# Patient Record
Sex: FEMALE | Race: WHITE | Employment: FULL TIME | URBAN - METROPOLITAN AREA
[De-identification: names, ages, dates, MRNs, and addresses within clinical notes are randomized per-mention and may not be internally consistent; named-entity substitution may affect disease eponyms.]

---

## 2017-01-11 RX ORDER — LEVOTHYROXINE SODIUM 88 UG/1
88 TABLET ORAL
Qty: 90 TAB | Refills: 3 | Status: SHIPPED | OUTPATIENT
Start: 2017-01-11 | End: 2017-05-29 | Stop reason: SDUPTHER

## 2017-01-11 NOTE — TELEPHONE ENCOUNTER
[facility] called and states that they have received the prescription request for the pts Ambien at there fax and they wanted to advise you so the medication could be resubmitted. Please advise.

## 2017-01-13 RX ORDER — ZOLPIDEM TARTRATE 10 MG/1
10 TABLET ORAL
Qty: 90 TAB | Refills: 1 | Status: SHIPPED | OUTPATIENT
Start: 2017-01-13 | End: 2017-07-16 | Stop reason: SDUPTHER

## 2017-01-20 DIAGNOSIS — G25.81 RLS (RESTLESS LEGS SYNDROME): ICD-10-CM

## 2017-01-20 RX ORDER — VALSARTAN 160 MG/1
TABLET ORAL
Qty: 90 TAB | Refills: 3 | Status: SHIPPED | OUTPATIENT
Start: 2017-01-20 | End: 2017-07-17 | Stop reason: SDUPTHER

## 2017-01-20 RX ORDER — CLONAZEPAM 0.5 MG/1
0.5 TABLET ORAL
Qty: 90 TAB | Refills: 1 | Status: SHIPPED | OUTPATIENT
Start: 2017-01-20 | End: 2017-08-17 | Stop reason: SDUPTHER

## 2017-01-20 NOTE — TELEPHONE ENCOUNTER
[facility] called to request refills for 90 day supplies with refills for the patient. Please call if any questions.  Thank you

## 2017-05-26 ENCOUNTER — OFFICE VISIT (OUTPATIENT)
Dept: INTERNAL MEDICINE CLINIC | Age: 62
End: 2017-05-26

## 2017-05-26 VITALS
SYSTOLIC BLOOD PRESSURE: 140 MMHG | DIASTOLIC BLOOD PRESSURE: 60 MMHG | OXYGEN SATURATION: 99 % | BODY MASS INDEX: 28.85 KG/M2 | WEIGHT: 156.8 LBS | HEIGHT: 62 IN | TEMPERATURE: 97.9 F | RESPIRATION RATE: 18 BRPM | HEART RATE: 76 BPM

## 2017-05-26 DIAGNOSIS — Z12.11 COLON CANCER SCREENING: ICD-10-CM

## 2017-05-26 DIAGNOSIS — E78.00 PURE HYPERCHOLESTEROLEMIA: ICD-10-CM

## 2017-05-26 DIAGNOSIS — E03.4 HYPOTHYROIDISM DUE TO ACQUIRED ATROPHY OF THYROID: ICD-10-CM

## 2017-05-26 DIAGNOSIS — I10 ESSENTIAL HYPERTENSION, BENIGN: Primary | ICD-10-CM

## 2017-05-26 DIAGNOSIS — G25.81 RLS (RESTLESS LEGS SYNDROME): ICD-10-CM

## 2017-05-26 RX ORDER — ESTERIFIED ESTROGEN AND METHYLTESTOSTERONE .625; 1.25 MG/1; MG/1
TABLET ORAL
COMMUNITY
Start: 2017-05-14 | End: 2017-11-01 | Stop reason: DRUGHIGH

## 2017-05-26 NOTE — PROGRESS NOTES
Chief Complaint   Patient presents with    Hypertension     follow up     1. Have you been to the ER, urgent care clinic since your last visit? Hospitalized since your last visit? No    2. Have you seen or consulted any other health care providers outside of the [address] [place] [address] since your last visit? Include any pap smears or colon screening.  No

## 2017-05-26 NOTE — MR AVS SNAPSHOT
Visit Information Date & Time Provider Department Dept. Phone Encounter #  
 5/26/2017  8:00 AM Gabriel Peterson, [address] [phone] [id number] Follow-up Instructions Return in about 6 months (around 11/26/2017) for htn hld hypothyoid. Upcoming Health Maintenance Date Due COLONOSCOPY 5/4/2017 INFLUENZA AGE 9 TO ADULT 8/1/2017 PAP AKA CERVICAL CYTOLOGY 9/17/2018 BREAST CANCER SCRN MAMMOGRAM 12/2/2018 DTaP/Tdap/Td series (2 - Td) 4/30/2022 Allergies as of 5/26/2017  Review Complete On: 5/26/2017 By: Ninetta Olszewski Severity Noted Reaction Type Reactions Aspirin  02/16/2010    Other (comments)  
 abd pain and cramping Clindamycin  02/28/2014    Other (comments) Abdominal pain Ibuprofen  02/16/2010    Other (comments)  
 abd pain and cramping Lisinopril  09/18/2015    Cough Nsaids (Non-steroidal Anti-inflammatory Drug)  02/13/2012    Other (comments) Pain and burning Pcn [Penicillins]  08/18/2009    Rash Tetracycline  08/18/2009    Nausea and Vomiting Current Immunizations  Reviewed on 11/13/2014 Name Date Influenza Vaccine 9/19/2014, 9/28/2013 Influenza Vaccine Whole 10/18/2010 Tdap 4/30/2012 Varicella Virus Vaccine 2/28/2013 Not reviewed this visit You Were Diagnosed With   
  
 Codes Comments Essential hypertension, benign    -  Primary ICD-10-CM: I10 
ICD-9-CM: 401.1 Pure hypercholesterolemia     ICD-10-CM: E78.00 ICD-9-CM: 272.0 Hypothyroidism due to acquired atrophy of thyroid     ICD-10-CM: E03.4 ICD-9-CM: 244.8, 246.8 RLS (restless legs syndrome)     ICD-10-CM: G25.81 ICD-9-CM: 333.94 Colon cancer screening     ICD-10-CM: Z12.11 ICD-9-CM: V76.51 Vitals BP Pulse Temp Resp Height(growth percentile) Weight(growth percentile)  140/60 (BP 1 Location: Right arm, BP Patient Position: Sitting) 76 97.9 °F (36.6 °C) (Oral) 18 5' 2\" (1.575 m) 156 lb 12.8 oz (71.1 kg) SpO2 BMI OB Status Smoking Status 99% 28.68 kg/m2 Hysterectomy Former Smoker BMI and BSA Data Body Mass Index Body Surface Area  
 28.68 kg/m 2 1.76 m 2 Preferred Pharmacy Pharmacy Name Phone [facility] [address], [address] [place] [address] [phone] Your Updated Medication List  
  
   
This list is accurate as of: 5/26/17  8:21 AM.  Always use your most recent med list.  
  
  
  
  
 clonazePAM 0.5 mg tablet Commonly known as:  Shelba Hock Take 1 Tab by mouth nightly as needed. * ESTRATEST 1.25-2.5 mg per tablet Generic drug:  estrogens (conjugated)-methylTESTOSTERone Take 1 Tab by mouth daily. * estrogens (conjugated)-methylTESTOSTERone 0.625-1.25 mg per tablet Commonly known as:  ESTRATEST HS  
  
 levothyroxine 88 mcg tablet Commonly known as:  SYNTHROID Take 1 Tab by mouth Daily (before breakfast). M-VIT PO Take 1 Tab by mouth daily. RESTASIS 0.05 % ophthalmic emulsion Generic drug:  cycloSPORINE Administer 1 Drop to both eyes two (2) times a day. simvastatin 20 mg tablet Commonly known as:  ZOCOR  
TAKE 1 TABLET NIGHTLY  
  
 valsartan 160 mg tablet Commonly known as:  DIOVAN  
TAKE 1 TABLET DAILY  
  
 zolpidem 10 mg tablet Commonly known as:  AMBIEN Take 1 Tab by mouth nightly as needed for Sleep. Max Daily Amount: 10 mg.  
  
 ZyrTEC 10 mg tablet Generic drug:  cetirizine Take  by mouth. * Notice: This list has 2 medication(s) that are the same as other medications prescribed for you. Read the directions carefully, and ask your doctor or other care provider to review them with you. We Performed the Following CBC W/O DIFF [28077 CPT(R)] LIPID PANEL [88341 CPT(R)] METABOLIC PANEL, COMPREHENSIVE [03865 CPT(R)] OCCULT BLOOD, IMMUNOASSAY (FIT) [id number] CPT(R)] TSH 3RD GENERATION [06815 CPT(R)] Follow-up Instructions Return in about 6 months (around 11/26/2017) for htn hld hypothyoid. Introducing [facility]! Dear Carlos Reid: 
Thank you for requesting a [employer] account. Our records indicate that you already have an active [employer] account. You can access your account anytime at [email or web address] Did you know that you can access your hospital and ER discharge instructions at any time in [employer]? You can also review all of your test results from your hospital stay or ER visit. Additional Information If you have questions, please visit the Frequently Asked Questions section of the [employer] website at [email or web address] Remember, [employer]ing is NOT to be used for urgent needs. For medical emergencies, dial 911. Now available from your iPhone and Android! Please provide this summary of care documentation to your next provider. Your primary care clinician is listed as Susie MOULTON. If you have any questions after today's visit, please call [phone].

## 2017-05-26 NOTE — PROGRESS NOTES
HISTORY OF PRESENT ILLNESS  Lynda Hadley is a 63 y.o. female. HPI     F/u HT HLD RLS  Sees Dr Carol Boo for hypothyroidism yearly  Lost her  to sudden illness last month  Dealing with the death ok  Takes ambien and RLS for insomnia and RLS  bp at home 2 times /week 130's/70's      Patient Active Problem List    Diagnosis Date Noted    Dry eyes 09/18/2015    Hypothyroidism due to acquired atrophy of thyroid 12/31/2012    Essential hypertension, benign 08/18/2009    Pure hypercholesterolemia 08/18/2009    RLS (restless legs syndrome) 08/18/2009     Current Outpatient Prescriptions   Medication Sig Dispense Refill    estrogens, conjugated,-methylTESTOSTERone (ESTRATEST HS) 0.625-1.25 mg per tablet       valsartan (DIOVAN) 160 mg tablet TAKE 1 TABLET DAILY 90 Tab 3    clonazePAM (KLONOPIN) 0.5 mg tablet Take 1 Tab by mouth nightly as needed. 90 Tab 1    zolpidem (AMBIEN) 10 mg tablet Take 1 Tab by mouth nightly as needed for Sleep. Max Daily Amount: 10 mg. 90 Tab 1    levothyroxine (SYNTHROID) 88 mcg tablet Take 1 Tab by mouth Daily (before breakfast). 90 Tab 3    cetirizine (ZYRTEC) 10 mg tablet Take  by mouth.  simvastatin (ZOCOR) 20 mg tablet TAKE 1 TABLET NIGHTLY 90 Tab 3    cycloSPORINE (RESTASIS) 0.05 % ophthalmic emulsion Administer 1 Drop to both eyes two (2) times a day.  estrogens, conjugated,-methyltestosterone (ESTRATEST) 1.25-2.5 mg per tablet Take 1 Tab by mouth daily.  PV W-O OXANA/FERROUS FUMARATE/FA (M-VIT PO) Take 1 Tab by mouth daily.        Allergies   Allergen Reactions    Aspirin Other (comments)     abd pain and cramping    Clindamycin Other (comments)     Abdominal pain    Ibuprofen Other (comments)     abd pain and cramping    Lisinopril Cough    Nsaids (Non-Steroidal Anti-Inflammatory Drug) Other (comments)     Pain and burning    Pcn [Penicillins] Rash    Tetracycline Nausea and Vomiting      Lab Results  Component Value Date/Time   Glucose 85 03/18/2016 08:14 AM   LDL, calculated 109 03/18/2016 08:14 AM   Creatinine 0.83 03/18/2016 08:14 AM      Lab Results  Component Value Date/Time   Cholesterol, total 189 03/18/2016 08:14 AM   HDL Cholesterol 69 03/18/2016 08:14 AM   LDL, calculated 109 03/18/2016 08:14 AM   Triglyceride 56 03/18/2016 08:14 AM   CHOL/HDL Ratio 2.4 08/09/2010 03:43 PM       Lab Results  Component Value Date/Time   GFR est AA 89 03/18/2016 08:14 AM   GFR est non-AA 77 03/18/2016 08:14 AM   Creatinine 0.83 03/18/2016 08:14 AM   BUN 9 03/18/2016 08:14 AM   Sodium 141 03/18/2016 08:14 AM   Potassium 4.4 03/18/2016 08:14 AM   Chloride 106 03/18/2016 08:14 AM   CO2 21 03/18/2016 08:14 AM         ROS    Physical Exam   Constitutional: She appears well-developed and well-nourished. Appears stated age   Cardiovascular: Normal rate, regular rhythm and normal heart sounds. Exam reveals no gallop and no friction rub. No murmur heard. Pulmonary/Chest: Effort normal and breath sounds normal. No respiratory distress. She has no wheezes. Abdominal: Soft. Bowel sounds are normal.   Musculoskeletal: She exhibits no edema. Neurological: She is alert. Psychiatric: She has a normal mood and affect. Nursing note and vitals reviewed. ASSESSMENT and PLAN  Jon Turner was seen today for hypertension. Diagnoses and all orders for this visit:    Essential hypertension, benign  -     CBC W/O DIFF  -     METABOLIC PANEL, COMPREHENSIVE   Mild SBP elevation, lower home readings.  Continue Diovan, low sodium diet, bp monitoring    Pure hypercholesterolemia  -     METABOLIC PANEL, COMPREHENSIVE  -     LIPID PANEL   Continue statin tx    Hypothyroidism due to acquired atrophy of thyroid  -     TSH 3RD GENERATION    RLS (restless legs syndrome)   Controlled with Klonopin  Bereavement   Has good family support, still working fulltime, does not feel she needs antidepressant  Colon cancer screening  -     OCCULT BLOOD, IMMUNOASSAY (FIT)   Pt declines referral at this time for screening colonoscopy      Follow-up Disposition:  Return in about 6 months (around 11/26/2017) for htn hld hypothyoid.

## 2017-05-27 LAB
ALBUMIN SERPL-MCNC: 4.1 G/DL (ref 3.6–4.8)
ALBUMIN/GLOB SERPL: 1.6 {RATIO} (ref 1.2–2.2)
ALP SERPL-CCNC: 45 IU/L (ref 39–117)
ALT SERPL-CCNC: 23 IU/L (ref 0–32)
AST SERPL-CCNC: 17 IU/L (ref 0–40)
BILIRUB SERPL-MCNC: 0.5 MG/DL (ref 0–1.2)
BUN SERPL-MCNC: 18 MG/DL (ref 8–27)
BUN/CREAT SERPL: 22 (ref 12–28)
CALCIUM SERPL-MCNC: 9.4 MG/DL (ref 8.7–10.3)
CHLORIDE SERPL-SCNC: 100 MMOL/L (ref 96–106)
CHOLEST SERPL-MCNC: 167 MG/DL (ref 100–199)
CO2 SERPL-SCNC: 22 MMOL/L (ref 18–29)
CREAT SERPL-MCNC: 0.83 MG/DL (ref 0.57–1)
ERYTHROCYTE [DISTWIDTH] IN BLOOD BY AUTOMATED COUNT: 14.6 % (ref 12.3–15.4)
GLOBULIN SER CALC-MCNC: 2.5 G/DL (ref 1.5–4.5)
GLUCOSE SERPL-MCNC: 90 MG/DL (ref 65–99)
HCT VFR BLD AUTO: 41.5 % (ref 34–46.6)
HDLC SERPL-MCNC: 77 MG/DL
HGB BLD-MCNC: 13.8 G/DL (ref 11.1–15.9)
LDLC SERPL CALC-MCNC: 78 MG/DL (ref 0–99)
MCH RBC QN AUTO: 29.3 PG (ref 26.6–33)
MCHC RBC AUTO-ENTMCNC: 33.3 G/DL (ref 31.5–35.7)
MCV RBC AUTO: 88 FL (ref 79–97)
PLATELET # BLD AUTO: 209 X10E3/UL (ref 150–379)
POTASSIUM SERPL-SCNC: 3.8 MMOL/L (ref 3.5–5.2)
PROT SERPL-MCNC: 6.6 G/DL (ref 6–8.5)
RBC # BLD AUTO: 4.71 X10E6/UL (ref 3.77–5.28)
SODIUM SERPL-SCNC: 138 MMOL/L (ref 134–144)
TRIGL SERPL-MCNC: 59 MG/DL (ref 0–149)
TSH SERPL DL<=0.005 MIU/L-ACNC: 0.09 UIU/ML (ref 0.45–4.5)
VLDLC SERPL CALC-MCNC: 12 MG/DL (ref 5–40)
WBC # BLD AUTO: 5.7 X10E3/UL (ref 3.4–10.8)

## 2017-05-29 ENCOUNTER — DOCUMENTATION ONLY (OUTPATIENT)
Dept: INTERNAL MEDICINE CLINIC | Age: 62
End: 2017-05-29

## 2017-05-29 RX ORDER — LEVOTHYROXINE SODIUM 88 UG/1
88 TABLET ORAL
Qty: 90 TAB | Refills: 3
Start: 2017-05-29 | End: 2017-07-17 | Stop reason: SDUPTHER

## 2017-05-29 NOTE — PROGRESS NOTES
Instructed pt to decrease levothyroxine to 88cg every day except 44 mcg q Saturday--pt verbalized understanding.

## 2017-06-29 ENCOUNTER — PATIENT MESSAGE (OUTPATIENT)
Dept: ENDOCRINOLOGY | Age: 62
End: 2017-06-29

## 2017-06-29 DIAGNOSIS — E03.4 HYPOTHYROIDISM DUE TO ACQUIRED ATROPHY OF THYROID: Primary | ICD-10-CM

## 2017-06-29 NOTE — TELEPHONE ENCOUNTER
From: [facility]  To: Vidal Robles MD  Sent: 2017 5:32 AM EDT  Subject: Non-Urgent Medical Question    Good morning Dr. Kennieth Gilford,  Dr. Louisa Cao performed TSH on 17 and it came back as . 085. I would like to have this retested at the end of August. Dr. Louisa Cao suggested that I take 44 mcg one day a week, so I am doing that. I think the results might not be true because my appointment happened three weeks after my   and I could have been improperly taking my medicine, as those first few weeks rattled my thinking. I do not feel as though I am having any hyperthyroid symptoms, which also leads me to believe that I may have taken too much levothyroxine. Please advise.   Thanks,  Gordon Jacobo

## 2017-06-30 LAB — HEMOCCULT STL QL IA: NEGATIVE

## 2017-07-17 ENCOUNTER — PATIENT MESSAGE (OUTPATIENT)
Dept: INTERNAL MEDICINE CLINIC | Age: 62
End: 2017-07-17

## 2017-07-17 ENCOUNTER — TELEPHONE (OUTPATIENT)
Dept: INTERNAL MEDICINE CLINIC | Age: 62
End: 2017-07-17

## 2017-07-17 RX ORDER — CETIRIZINE HCL 10 MG
10 TABLET ORAL
Qty: 90 TAB | Refills: 3 | Status: SHIPPED | OUTPATIENT
Start: 2017-07-17 | End: 2021-08-24 | Stop reason: SDUPTHER

## 2017-07-17 RX ORDER — ZOLPIDEM TARTRATE 10 MG/1
10 TABLET ORAL
Qty: 90 TAB | Refills: 1 | Status: SHIPPED | OUTPATIENT
Start: 2017-07-17 | End: 2017-07-24 | Stop reason: SDUPTHER

## 2017-07-17 RX ORDER — SIMVASTATIN 20 MG/1
TABLET, FILM COATED ORAL
Qty: 90 TAB | Refills: 3 | Status: SHIPPED | OUTPATIENT
Start: 2017-07-17 | End: 2017-07-24 | Stop reason: SDUPTHER

## 2017-07-17 RX ORDER — VALSARTAN 160 MG/1
TABLET ORAL
Qty: 90 TAB | Refills: 3 | Status: SHIPPED | OUTPATIENT
Start: 2017-07-17 | End: 2017-07-24 | Stop reason: SDUPTHER

## 2017-07-17 RX ORDER — LEVOTHYROXINE SODIUM 88 UG/1
88 TABLET ORAL
Qty: 90 TAB | Refills: 3 | Status: SHIPPED | OUTPATIENT
Start: 2017-07-17 | End: 2017-07-22 | Stop reason: DRUGHIGH

## 2017-07-17 NOTE — TELEPHONE ENCOUNTER
From: Danielle Estevez  To: Bing Lagos MD  Sent: 7/16/2017 3:48 PM EDT  Subject: Medication Renewal Request    Original authorizing provider: MD Danielle Wong would like a refill of the following medications:  zolpidem (AMBIEN) 10 mg tablet Bing Lagos MD]    Preferred pharmacy: [facility] [address] [place] [id number] [phone]    Comment:

## 2017-07-17 NOTE — TELEPHONE ENCOUNTER
Requested Prescriptions     Pending Prescriptions Disp Refills    zolpidem (AMBIEN) 10 mg tablet 90 Tab 1     Sig: Take 1 Tab by mouth nightly as needed for Sleep. Max Daily Amount: 10 mg.      Last Refill:1/13/17    Last Office Visit: 5/26/17    Upcoming Appointment:12/01/17

## 2017-07-17 NOTE — TELEPHONE ENCOUNTER
From: Elvia Beltran  To: Richard Goldberg MD  Sent: 7/17/2017 1:43 PM EDT  Subject: Prescription Question    I have new insurance so my prescription should be [facility] to [facility] [address] [facility].   Thanks,  Rudy Broderick

## 2017-07-20 LAB
T4 FREE SERPL-MCNC: 1.11 NG/DL (ref 0.82–1.77)
TSH SERPL DL<=0.005 MIU/L-ACNC: 2.65 UIU/ML (ref 0.45–4.5)

## 2017-07-23 RX ORDER — LEVOTHYROXINE SODIUM 88 UG/1
88 TABLET ORAL
Qty: 90 TAB | Refills: 3 | Status: SHIPPED | OUTPATIENT
Start: 2017-07-23 | End: 2018-03-15

## 2017-07-24 ENCOUNTER — PATIENT MESSAGE (OUTPATIENT)
Dept: INTERNAL MEDICINE CLINIC | Age: 62
End: 2017-07-24

## 2017-07-24 DIAGNOSIS — I10 ESSENTIAL HYPERTENSION: ICD-10-CM

## 2017-07-24 DIAGNOSIS — E78.01 FAMILIAL HYPERCHOLESTEROLEMIA: ICD-10-CM

## 2017-07-24 DIAGNOSIS — F51.01 PRIMARY INSOMNIA: Primary | ICD-10-CM

## 2017-07-24 RX ORDER — SIMVASTATIN 20 MG/1
TABLET, FILM COATED ORAL
Qty: 90 TAB | Refills: 3 | Status: SHIPPED | OUTPATIENT
Start: 2017-07-24 | End: 2020-06-15

## 2017-07-24 RX ORDER — ZOLPIDEM TARTRATE 10 MG/1
10 TABLET ORAL
Qty: 90 TAB | Refills: 1 | OUTPATIENT
Start: 2017-07-24 | End: 2018-01-21 | Stop reason: SDUPTHER

## 2017-07-24 RX ORDER — VALSARTAN 160 MG/1
TABLET ORAL
Qty: 90 TAB | Refills: 3 | Status: SHIPPED | OUTPATIENT
Start: 2017-07-24 | End: 2018-08-23

## 2017-08-17 DIAGNOSIS — G25.81 RLS (RESTLESS LEGS SYNDROME): ICD-10-CM

## 2017-08-17 RX ORDER — CLONAZEPAM 0.5 MG/1
0.5 TABLET ORAL
Qty: 90 TAB | Refills: 1 | Status: SHIPPED | OUTPATIENT
Start: 2017-08-17 | End: 2018-02-04 | Stop reason: SDUPTHER

## 2017-08-17 NOTE — TELEPHONE ENCOUNTER
From: Leighann Hernandez  To: Aliyah Mcclain MD  Sent: 8/17/2017 7:53 AM EDT  Subject: Medication Renewal Request    Original authorizing provider: MD Leighann Kumar would like a refill of the following medications:  clonazePAM (KLONOPIN) 0.5 mg tablet Aliyah Mcclain MD]    Preferred pharmacy: [id number] - [address], [address] [place] [address]:

## 2017-11-01 ENCOUNTER — OFFICE VISIT (OUTPATIENT)
Dept: PRIMARY CARE CLINIC | Age: 62
End: 2017-11-01

## 2017-11-01 VITALS
RESPIRATION RATE: 18 BRPM | HEIGHT: 62 IN | TEMPERATURE: 97.5 F | WEIGHT: 159 LBS | BODY MASS INDEX: 29.26 KG/M2 | OXYGEN SATURATION: 97 % | SYSTOLIC BLOOD PRESSURE: 115 MMHG | DIASTOLIC BLOOD PRESSURE: 74 MMHG | HEART RATE: 81 BPM

## 2017-11-01 DIAGNOSIS — L03.213 PRESEPTAL CELLULITIS OF LEFT EYE: Primary | ICD-10-CM

## 2017-11-01 RX ORDER — SULFAMETHOXAZOLE AND TRIMETHOPRIM 800; 160 MG/1; MG/1
1 TABLET ORAL 2 TIMES DAILY
Qty: 14 TAB | Refills: 0 | Status: SHIPPED | OUTPATIENT
Start: 2017-11-01 | End: 2017-11-09

## 2017-11-01 RX ORDER — CEFDINIR 300 MG/1
300 CAPSULE ORAL 2 TIMES DAILY
Qty: 14 CAP | Refills: 0 | Status: SHIPPED | OUTPATIENT
Start: 2017-11-01 | End: 2017-11-09

## 2017-11-01 RX ORDER — ESTRADIOL 0.5 MG/1
TABLET ORAL
Refills: 2 | COMMUNITY
Start: 2017-09-10

## 2017-11-01 NOTE — PROGRESS NOTES
Chief Complaint   Patient presents with    Eye Swelling     left eye for 2 days, painful and swollen

## 2017-11-01 NOTE — PROGRESS NOTES
HISTORY OF PRESENT ILLNESS  Tashi Burgess is a 63 y.o. female. HPI  Presents for left eye swelling x2 days. No eye redness but noticed eyelid swelling and erythema since yesterday, worse today. She denies any fever or chills. She denies recent URI symptoms. She uses restasis for eye dryness, think she may have scratched her eyes while asleep. Review of Systems   Constitutional: Negative for fever and weight loss. HENT: Negative for congestion, ear pain and sore throat. Eyes: Positive for blurred vision (mild due to eyelid swelling) and redness (eyelid as in HPI). Negative for pain and discharge. Respiratory: Negative for cough and stridor. Cardiovascular: Negative for chest pain and palpitations. Gastrointestinal: Negative for abdominal pain, diarrhea and nausea. Genitourinary: Negative for dysuria and frequency. Musculoskeletal: Negative for back pain, falls and neck pain. Skin: Positive for rash. As in HPI   Neurological: Negative for tingling, sensory change, focal weakness, weakness and headaches.      Past Medical History:   Diagnosis Date    Helicobacter pylori ab+     treated    Hypercholesteremia     Hypertension     Obesity surgery status 2009    s/p lap band Dr. Maura Sotomayor RLS (restless legs syndrome)     Unspecified hypothyroidism 12/31/2012     Past Surgical History:   Procedure Laterality Date    HX CARPAL TUNNEL RELEASE      b/l    HX CATARACT REMOVAL Bilateral June and July 2014    HX CERVICAL DISKECTOMY      c5/c6 2006    HX GYN      tubal ligation    HX GYN  Jan 2014    pelvic organ prolapse surgery    HX HEENT      tonsillectomy    HX ORTHOPAEDIC      x2 trigger fingers right hand    HX OTHER SURGICAL  4/2008    lap band    HX PARTIAL HYSTERECTOMY      LAP, CHANGE ADJUST KAYLEE BAND       Social History     Social History    Marital status:      Spouse name: N/A    Number of children: N/A    Years of education: N/A     Social History Main Topics    Smoking status: Former Smoker     Packs/day: 1.00     Years: 2.00     Quit date: 12/30/1978    Smokeless tobacco: Never Used    Alcohol use Yes      Comment: 2 drinks weekly    Drug use: No    Sexual activity: Not Asked     Other Topics Concern    None     Social History Narrative    Lives in [place] with  and 1 daughter and 1 son. Also has another son. Works at a [employer] at [employer]. Likes to read and run. Family History   Problem Relation Age of Onset    Thyroid Disease Mother      hashimoto's    Thyroid Disease Daughter      hashimoto's    Diabetes Father      type 2    Heart Disease Father 64     multiple MIs     Current Outpatient Prescriptions on File Prior to Visit   Medication Sig Dispense Refill    clonazePAM (KLONOPIN) 0.5 mg tablet Take 1 Tab by mouth nightly as needed. 90 Tab 1    zolpidem (AMBIEN) 10 mg tablet Take 1 Tab by mouth nightly as needed for Sleep. Max Daily Amount: 10 mg. 90 Tab 1    valsartan (DIOVAN) 160 mg tablet TAKE 1 TABLET DAILY 90 Tab 3    simvastatin (ZOCOR) 20 mg tablet TAKE 1 TABLET NIGHTLY 90 Tab 3    levothyroxine (SYNTHROID) 88 mcg tablet Take 1 Tab by mouth Daily (before breakfast). 90 Tab 3    cetirizine (ZYRTEC) 10 mg tablet Take 1 Tab by mouth daily as needed for Allergies. 90 Tab 3    cycloSPORINE (RESTASIS) 0.05 % ophthalmic emulsion Administer 1 Drop to both eyes two (2) times a day.  PV W-O OXANA/FERROUS FUMARATE/FA (M-VIT PO) Take 1 Tab by mouth daily. No current facility-administered medications on file prior to visit.       Allergies   Allergen Reactions    Aspirin Other (comments)     abd pain and cramping    Clindamycin Other (comments)     Abdominal pain    Ibuprofen Other (comments)     abd pain and cramping    Lisinopril Cough    Nsaids (Non-Steroidal Anti-Inflammatory Drug) Other (comments)     Pain and burning    Pcn [Penicillins] Rash    Tetracycline Nausea and Vomiting       Physical Exam Constitutional: She is oriented to person, place, and time. She appears well-developed and well-nourished. No distress. /74 (BP 1 Location: Right arm, BP Patient Position: Sitting)  Pulse 81  Temp 97.5 °F (36.4 °C) (Oral)   Resp 18  Ht 5' 2\" (1.575 m)  Wt 159 lb (72.1 kg)  SpO2 97%  BMI 29.08 kg/m2   HENT:   Head: Normocephalic and atraumatic. Right Ear: Tympanic membrane normal.   Left Ear: Tympanic membrane normal.   Nose: Nose normal.   Mouth/Throat: Oropharynx is clear and moist. No oropharyngeal exudate. Eyes: Conjunctivae and EOM are normal. Pupils are equal, round, and reactive to light. No scleral icterus. Left upper and lower eyelid edema, erythema, warmth, mild ttp. No orbital involvement. EOMI. Right eye lids normal.    Neck: Normal range of motion. Neck supple. Cardiovascular: Normal rate, regular rhythm, normal heart sounds and intact distal pulses. No murmur heard. Pulmonary/Chest: Effort normal and breath sounds normal. No stridor. No respiratory distress. She has no wheezes. Abdominal: Soft. Bowel sounds are normal. She exhibits no distension. There is no tenderness. There is no rebound and no guarding. Musculoskeletal: Normal range of motion. She exhibits no edema or tenderness. Neurological: She is alert and oriented to person, place, and time. No cranial nerve deficit. Skin: Skin is warm and dry. No rash noted. Psychiatric: She has a normal mood and affect. Her behavior is normal.   Nursing note and vitals reviewed. ASSESSMENT and PLAN    ICD-10-CM ICD-9-CM    1. Preseptal cellulitis of left eye L03.213 373.13 trimethoprim-sulfamethoxazole (BACTRIM DS, SEPTRA DS) 160-800 mg per tablet      cefdinir (OMNICEF) 300 mg capsule   start bactrim+omnicef x7 days. Warm compresses followed by cold. Return if no improvement in 2-3 days, sooner if worsening, ED warnings discussed. This note will not be viewable in [address]

## 2017-11-01 NOTE — PATIENT INSTRUCTIONS
Cellulitis of the Eye: Care Instructions  Your Care Instructions    Cellulitis of the eye is an infection of the skin and tissues around the eye. It is also called periorbital cellulitis. It is usually caused by bacteria. This type of infection may happen after a sinus infection or a dental infection. It could also happen after an insect bite or an injury to the face. It most often occurs where there is a break in the skin. Cellulitis of the eye can be very serious. It's important to treat it right away. If you do, it usually goes away without lasting problems. Medicine and home treatment can help you get better. Follow-up care is a key part of your treatment and safety. Be sure to make and go to all appointments, and call your doctor if you are having problems. It's also a good idea to know your test results and keep a list of the medicines you take. How can you care for yourself at home? · Take your antibiotics as directed. Do not stop taking them just because you feel better. You need to take the full course of antibiotics. · Do not wear contact lenses unless your doctor tells you it is okay. · Put your head on pillows, and put a cool, damp cloth on your eye. This can reduce swelling and pain. · If your doctor recommends it, use a warm pack on your eye. · Keep the skin around your eye clean and dry. · Be safe with medicines. Read and follow all instructions on the label. ¨ If the doctor gave you a prescription medicine for pain, take it as prescribed. ¨ If you are not taking a prescription pain medicine, ask your doctor if you can take an over-the-counter medicine. To prevent cellulitis  · Wash your hands well after you use the bathroom and before and after you eat. · Do not rub or pick at the skin around your eyes. Cellulitis occurs most often where there is a break in the skin. · If you get a cut, pimple, or insect bite near your eye, clean the area as soon as you can.  This can help prevent an infection. ¨ Wash the area with cool water and a mild soap, such as [place]. ¨ Do not use rubbing alcohol, hydrogen peroxide, iodine, or Mercurochrome. These can harm the tissues and slow healing. · Call your doctor if you have a sinus infection with redness or swelling of your eyes. When should you call for help? Call your doctor now or seek immediate medical care if:  ? · You have new or worse signs of an eye infection, such as:  ¨ Pus or thick discharge coming from the eye. ¨ Redness or swelling around the eye. ¨ A fever. ? · Your eye seems to be bulging out. ? · You seem to be getting sicker. ? · You have vision changes. ? Watch closely for changes in your health, and be sure to contact your doctor if:  ? · You do not get better as expected. Where can you learn more? Go to http://mona-alla.info/. Enter [phone] in the search box to learn more about \"Cellulitis of the Eye: Care Instructions. \"  Current as of: March 3, 2017  Content Version: 11.4  © 4644-0646 [employer]. Care instructions adapted under license by [employer] (which disclaims liability or warranty for this information). If you have questions about a medical condition or this instruction, always ask your healthcare professional. [address] any warranty or liability for your use of this information.

## 2017-11-01 NOTE — MR AVS SNAPSHOT
Visit Information Date & Time Provider Department Dept. Phone Encounter #  
 11/1/2017  2:30  Roberto Doyle, [facility] [id number] Your Appointments 12/1/2017  7:45 AM  
ROUTINE CARE with Marcela Canada MD  
[facility] [address]) Appt Note: 6 month follow up  
 [address] [address]  
[phone]  
  
   
 [address]  [address] [address]  
  
    
 2/23/2018  8:50 AM  
Follow Up with Fran Lai MD  
[facility] [address]) Appt Note: 6 month f/u thyroid; 6 month f/u Thyroid [id number] [address] Upcoming Health Maintenance Date Due COLONOSCOPY 5/4/2017 PAP AKA CERVICAL CYTOLOGY 9/17/2018 BREAST CANCER SCRN MAMMOGRAM 12/2/2018 DTaP/Tdap/Td series (2 - Td) 4/30/2022 Allergies as of 11/1/2017  Review Complete On: 11/1/2017 By: Meg Doyle MD  
  
 Severity Noted Reaction Type Reactions Aspirin  02/16/2010    Other (comments)  
 abd pain and cramping Clindamycin  02/28/2014    Other (comments) Abdominal pain Ibuprofen  02/16/2010    Other (comments)  
 abd pain and cramping Lisinopril  09/18/2015    Cough Nsaids (Non-steroidal Anti-inflammatory Drug)  02/13/2012    Other (comments) Pain and burning Pcn [Penicillins]  08/18/2009    Rash Tetracycline  08/18/2009    Nausea and Vomiting Current Immunizations  Reviewed on 11/13/2014 Name Date Influenza Vaccine 9/19/2014, 9/28/2013 Influenza Vaccine Whole 10/18/2010 Tdap 4/30/2012 Varicella Virus Vaccine 2/28/2013 Not reviewed this visit You Were Diagnosed With   
  
 Codes Comments Preseptal cellulitis of left eye    -  Primary ICD-10-CM: N56.150 ICD-9-CM: 373.13 Vitals BP Pulse Temp Resp Height(growth percentile) Weight(growth percentile) 115/74 (BP 1 Location: Right arm, BP Patient Position: Sitting) 81 97.5 °F (36.4 °C) (Oral) 18 5' 2\" (1.575 m) 159 lb (72.1 kg) SpO2 BMI OB Status Smoking Status 97% 29.08 kg/m2 Hysterectomy Former Smoker BMI and BSA Data Body Mass Index Body Surface Area 29.08 kg/m 2 1.78 m 2 Preferred Pharmacy Pharmacy Name Phone [address] [place] [address], [address] [place] [address] Cynthia Rouse Said [phone] Your Updated Medication List  
  
   
This list is accurate as of: 11/1/17  2:55 PM.  Always use your most recent med list.  
  
  
  
  
 cefdinir 300 mg capsule Commonly known as:  OMNICEF Take 1 Cap by mouth two (2) times a day for 7 days. cetirizine 10 mg tablet Commonly known as:  ZyrTEC Take 1 Tab by mouth daily as needed for Allergies. clonazePAM 0.5 mg tablet Commonly known as:  Beulah Scrape Take 1 Tab by mouth nightly as needed. estradiol 0.5 mg tablet Commonly known as:  ESTRACE  
TAKE 1 TABLET BY MOUTH EVERY DAY  
  
 levothyroxine 88 mcg tablet Commonly known as:  SYNTHROID Take 1 Tab by mouth Daily (before breakfast). M-VIT PO Take 1 Tab by mouth daily. RESTASIS 0.05 % ophthalmic emulsion Generic drug:  cycloSPORINE Administer 1 Drop to both eyes two (2) times a day. simvastatin 20 mg tablet Commonly known as:  ZOCOR  
TAKE 1 TABLET NIGHTLY  
  
 trimethoprim-sulfamethoxazole 160-800 mg per tablet Commonly known as:  BACTRIM DS, SEPTRA DS Take 1 Tab by mouth two (2) times a day for 7 days. valsartan 160 mg tablet Commonly known as:  DIOVAN  
TAKE 1 TABLET DAILY  
  
 zolpidem 10 mg tablet Commonly known as:  AMBIEN Take 1 Tab by mouth nightly as needed for Sleep. Max Daily Amount: 10 mg.  
  
  
  
  
Prescriptions Sent to Pharmacy Refills trimethoprim-sulfamethoxazole (BACTRIM DS, SEPTRA DS) 160-800 mg per tablet 0 Sig: Take 1 Tab by mouth two (2) times a day for 7 days. Class: Normal  
 Pharmacy: [facility] at [address] Ph #: [phone] Route: Oral  
 cefdinir (OMNICEF) 300 mg capsule 0 Sig: Take 1 Cap by mouth two (2) times a day for 7 days. Class: Normal  
 Pharmacy: [facility] at [address] Ph #: [phone] Route: Oral  
  
Patient Instructions Cellulitis of the Eye: Care Instructions Your Care Instructions Cellulitis of the eye is an infection of the skin and tissues around the eye. It is also called periorbital cellulitis. It is usually caused by bacteria. This type of infection may happen after a sinus infection or a dental infection. It could also happen after an insect bite or an injury to the face. It most often occurs where there is a break in the skin. Cellulitis of the eye can be very serious. It's important to treat it right away. If you do, it usually goes away without lasting problems. Medicine and home treatment can help you get better. Follow-up care is a key part of your treatment and safety. Be sure to make and go to all appointments, and call your doctor if you are having problems. It's also a good idea to know your test results and keep a list of the medicines you take. How can you care for yourself at home? · Take your antibiotics as directed. Do not stop taking them just because you feel better. You need to take the full course of antibiotics. · Do not wear contact lenses unless your doctor tells you it is okay. · Put your head on pillows, and put a cool, damp cloth on your eye. This can reduce swelling and pain. · If your doctor recommends it, use a warm pack on your eye. · Keep the skin around your eye clean and dry. · Be safe with medicines. Read and follow all instructions on the label. ¨ If the doctor gave you a prescription medicine for pain, take it as prescribed. ¨ If you are not taking a prescription pain medicine, ask your doctor if you can take an over-the-counter medicine. To prevent cellulitis · Wash your hands well after you use the bathroom and before and after you eat. · Do not rub or pick at the skin around your eyes. Cellulitis occurs most often where there is a break in the skin. · If you get a cut, pimple, or insect bite near your eye, clean the area as soon as you can. This can help prevent an infection. ¨ Wash the area with cool water and a mild soap, such as [place]. ¨ Do not use rubbing alcohol, hydrogen peroxide, iodine, or Mercurochrome. These can harm the tissues and slow healing. · Call your doctor if you have a sinus infection with redness or swelling of your eyes. When should you call for help? Call your doctor now or seek immediate medical care if: 
? · You have new or worse signs of an eye infection, such as: 
¨ Pus or thick discharge coming from the eye. ¨ Redness or swelling around the eye. ¨ A fever. ? · Your eye seems to be bulging out. ? · You seem to be getting sicker. ? · You have vision changes. ? Watch closely for changes in your health, and be sure to contact your doctor if: 
? · You do not get better as expected. Where can you learn more? Go to http://mona-alla.info/. Enter 020 [id number] in the search box to learn more about \"Cellulitis of the Eye: Care Instructions. \" Current as of: March 3, 2017 Content Version: 11.4 © 5997-0534 [employer]. Care instructions adapted under license by [employer] (which disclaims liability or warranty for this information).  If you have questions about a medical condition or this instruction, always ask your healthcare professional. [employer] disclaims any warranty or liability for your use of this information. Introducing [facility]! Dear Alejandra Garcia: 
Thank you for requesting a Innov[employer] account. Our records indicate that you already have an active Innov[employer] account. You can access your account anytime at [email or web address] Did you know that you can access your hospital and ER discharge instructions at any time in Innovid? You can also review all of your test results from your hospital stay or ER visit. Additional Information If you have questions, please visit the Frequently Asked Questions section of the Innovid website at [email or web address] Remember, Innovid is NOT to be used for urgent needs. For medical emergencies, dial 911. Now available from your iPhone and Android! Please provide this summary of care documentation to your next provider. Your primary care clinician is listed as Joselito MOULTON. If you have any questions after today's visit, please call [phone].

## 2017-11-09 ENCOUNTER — HOSPITAL ENCOUNTER (EMERGENCY)
Age: 62
Discharge: HOME | End: 2017-11-09
Attending: EMERGENCY MEDICINE
Payer: COMMERCIAL

## 2017-11-09 ENCOUNTER — APPOINTMENT (OUTPATIENT)
Dept: CT IMAGING | Age: 62
End: 2017-11-09
Attending: EMERGENCY MEDICINE
Payer: COMMERCIAL

## 2017-11-09 VITALS
HEIGHT: 63 IN | RESPIRATION RATE: 14 BRPM | DIASTOLIC BLOOD PRESSURE: 81 MMHG | SYSTOLIC BLOOD PRESSURE: 129 MMHG | OXYGEN SATURATION: 93 % | HEART RATE: 70 BPM | BODY MASS INDEX: 27.46 KG/M2 | WEIGHT: 155 LBS | TEMPERATURE: 97.5 F

## 2017-11-09 DIAGNOSIS — K52.9 GASTROENTERITIS, ACUTE: Primary | ICD-10-CM

## 2017-11-09 LAB
ALBUMIN SERPL-MCNC: 3.6 G/DL (ref 3.5–5)
ALBUMIN/GLOB SERPL: 0.8 {RATIO} (ref 1.1–2.2)
ALP SERPL-CCNC: 68 U/L (ref 45–117)
ALT SERPL-CCNC: 25 U/L (ref 12–78)
ANION GAP SERPL CALC-SCNC: 13 MMOL/L (ref 5–15)
AST SERPL-CCNC: 15 U/L (ref 15–37)
BASOPHILS # BLD: 0 K/UL
BASOPHILS NFR BLD: 0 %
BILIRUB SERPL-MCNC: 0.4 MG/DL (ref 0.2–1)
BUN SERPL-MCNC: 9 MG/DL (ref 6–20)
BUN/CREAT SERPL: 11 (ref 12–20)
CALCIUM SERPL-MCNC: 9.6 MG/DL (ref 8.5–10.1)
CHLORIDE SERPL-SCNC: 102 MMOL/L (ref 97–108)
CO2 SERPL-SCNC: 20 MMOL/L (ref 21–32)
CREAT SERPL-MCNC: 0.83 MG/DL (ref 0.55–1.02)
DIFFERENTIAL METHOD BLD: ABNORMAL
EOSINOPHIL # BLD: 0 K/UL
EOSINOPHIL NFR BLD: 0 %
ERYTHROCYTE [DISTWIDTH] IN BLOOD BY AUTOMATED COUNT: 13.5 % (ref 11.5–14.5)
GLOBULIN SER CALC-MCNC: 4.3 G/DL (ref 2–4)
GLUCOSE SERPL-MCNC: 146 MG/DL (ref 65–100)
HCT VFR BLD AUTO: 45.9 % (ref 35–47)
HGB BLD-MCNC: 16.3 G/DL (ref 11.5–16)
LACTATE SERPL-SCNC: 1.5 MMOL/L (ref 0.4–2)
LYMPHOCYTES # BLD: 0.6 K/UL
LYMPHOCYTES NFR BLD: 8 %
MCH RBC QN AUTO: 31.6 PG (ref 26–34)
MCHC RBC AUTO-ENTMCNC: 35.5 G/DL (ref 30–36.5)
MCV RBC AUTO: 89 FL (ref 80–99)
MONOCYTES # BLD: 0.1 K/UL
MONOCYTES NFR BLD: 2 %
NEUTS SEG # BLD: 6.4 K/UL
NEUTS SEG NFR BLD: 90 %
PLATELET # BLD AUTO: 192 K/UL (ref 150–400)
POTASSIUM SERPL-SCNC: 3.6 MMOL/L (ref 3.5–5.1)
PROT SERPL-MCNC: 7.9 G/DL (ref 6.4–8.2)
RBC # BLD AUTO: 5.16 M/UL (ref 3.8–5.2)
RBC MORPH BLD: ABNORMAL
SODIUM SERPL-SCNC: 135 MMOL/L (ref 136–145)
WBC # BLD AUTO: 7.1 K/UL (ref 3.6–11)

## 2017-11-09 PROCEDURE — 80053 COMPREHEN METABOLIC PANEL: CPT | Performed by: EMERGENCY MEDICINE

## 2017-11-09 PROCEDURE — 96376 TX/PRO/DX INJ SAME DRUG ADON: CPT

## 2017-11-09 PROCEDURE — 74011250636 HC RX REV CODE- 250/636: Performed by: EMERGENCY MEDICINE

## 2017-11-09 PROCEDURE — 99284 EMERGENCY DEPT VISIT MOD MDM: CPT

## 2017-11-09 PROCEDURE — 96361 HYDRATE IV INFUSION ADD-ON: CPT

## 2017-11-09 PROCEDURE — 83605 ASSAY OF LACTIC ACID: CPT | Performed by: EMERGENCY MEDICINE

## 2017-11-09 PROCEDURE — 36415 COLL VENOUS BLD VENIPUNCTURE: CPT | Performed by: EMERGENCY MEDICINE

## 2017-11-09 PROCEDURE — 74177 CT ABD & PELVIS W/CONTRAST: CPT

## 2017-11-09 PROCEDURE — 96374 THER/PROPH/DIAG INJ IV PUSH: CPT

## 2017-11-09 PROCEDURE — 96375 TX/PRO/DX INJ NEW DRUG ADDON: CPT

## 2017-11-09 PROCEDURE — 74011636320 HC RX REV CODE- 636/320: Performed by: EMERGENCY MEDICINE

## 2017-11-09 PROCEDURE — 85025 COMPLETE CBC W/AUTO DIFF WBC: CPT | Performed by: EMERGENCY MEDICINE

## 2017-11-09 RX ORDER — MORPHINE SULFATE 2 MG/ML
4 INJECTION, SOLUTION INTRAMUSCULAR; INTRAVENOUS
Status: COMPLETED | OUTPATIENT
Start: 2017-11-09 | End: 2017-11-09

## 2017-11-09 RX ORDER — SODIUM CHLORIDE 0.9 % (FLUSH) 0.9 %
10 SYRINGE (ML) INJECTION
Status: COMPLETED | OUTPATIENT
Start: 2017-11-09 | End: 2017-11-09

## 2017-11-09 RX ORDER — DIPHENOXYLATE HYDROCHLORIDE AND ATROPINE SULFATE 2.5; .025 MG/1; MG/1
1 TABLET ORAL
Qty: 20 TAB | Refills: 0 | Status: SHIPPED | OUTPATIENT
Start: 2017-11-09 | End: 2018-02-23

## 2017-11-09 RX ORDER — DICYCLOMINE HYDROCHLORIDE 10 MG/1
10 CAPSULE ORAL 4 TIMES DAILY
Qty: 20 CAP | Refills: 0 | Status: SHIPPED | OUTPATIENT
Start: 2017-11-09 | End: 2017-11-14

## 2017-11-09 RX ORDER — ONDANSETRON 2 MG/ML
4 INJECTION INTRAMUSCULAR; INTRAVENOUS
Status: COMPLETED | OUTPATIENT
Start: 2017-11-09 | End: 2017-11-09

## 2017-11-09 RX ORDER — SODIUM CHLORIDE 9 MG/ML
50 INJECTION, SOLUTION INTRAVENOUS
Status: COMPLETED | OUTPATIENT
Start: 2017-11-09 | End: 2017-11-09

## 2017-11-09 RX ADMIN — SODIUM CHLORIDE 1000 ML: 900 INJECTION, SOLUTION INTRAVENOUS at 12:38

## 2017-11-09 RX ADMIN — ONDANSETRON HYDROCHLORIDE 4 MG: 2 INJECTION, SOLUTION INTRAMUSCULAR; INTRAVENOUS at 14:31

## 2017-11-09 RX ADMIN — SODIUM CHLORIDE 50 ML/HR: 900 INJECTION, SOLUTION INTRAVENOUS at 14:48

## 2017-11-09 RX ADMIN — SODIUM CHLORIDE 1000 ML: 900 INJECTION, SOLUTION INTRAVENOUS at 12:04

## 2017-11-09 RX ADMIN — MORPHINE SULFATE 4 MG: 2 INJECTION, SOLUTION INTRAMUSCULAR; INTRAVENOUS at 12:03

## 2017-11-09 RX ADMIN — MORPHINE SULFATE 4 MG: 2 INJECTION, SOLUTION INTRAMUSCULAR; INTRAVENOUS at 14:31

## 2017-11-09 RX ADMIN — ONDANSETRON HYDROCHLORIDE 4 MG: 2 INJECTION, SOLUTION INTRAMUSCULAR; INTRAVENOUS at 12:03

## 2017-11-09 RX ADMIN — Medication 10 ML: at 14:48

## 2017-11-09 RX ADMIN — IOPAMIDOL 100 ML: 755 INJECTION, SOLUTION INTRAVENOUS at 14:47

## 2017-11-09 NOTE — ED NOTES
Pt given discharge information. All questions and concerns answered. No additional needs at this time. Pt ambulatory out with family.

## 2017-11-09 NOTE — ED NOTES
Bedside and Verbal shift change report received from Catherine Santiago Rn (offgoing nurse) given to Jim Narayanan RN (oncoming nurse). Report included the following information SBAR, Kardex, ED Summary, MAR, Recent Results and Med Rec Status.

## 2017-11-09 NOTE — Clinical Note
Thank you for allowing us to provide you with excellent care today. We hope we addressed all of your concerns and needs. We strive to provide excellent quality care in the Emergency Department. Please rate us as excellent, as anything less than exce llent does not meet our expectations. If you feel that you have not received excellent quality care or timely care, please ask to speak to the nurse manager. Please choose us in the future for your continued health care needs. The exam and treatm ent you received in the Emergency Department were for an urgent problem and are not intended as complete care. It is important that you follow-up with a doctor, nurse practitioner, or physician assistant to:  (1) confirm your diagnosis,  (2) re-evaluatio n of changes in your illness and treatment, and  (3) for ongoing care. If your symptoms become worse or you do not improve as expected and you are unable to reach your usual health care provider, you should return to the Emergency Department. We are bob ilable 24 hours a day. Take this sheet with you when you go to your follow-up visit. If you have any problem arranging the follow-up visit, contact [address] [place] [address] 21 [phone]) Make an appointment with your Primary Care doctor for follow up of this visit. Re turn to the ER if you are unable to be seen in the time recommended on your discharge instructions.

## 2017-11-09 NOTE — ED NOTES
Pt arrives from triage with c/o abdominal pain since last night around 8pm. Pt states she just finished a course of antibiotics (Bactrium) yesterday for cellulitis.

## 2017-11-09 NOTE — ED NOTES
Bedside shift change report given to Rey Landry (oncoming nurse) by Keiko Diaz (offgoing nurse). Report included the following information SBAR, Kardex, ED Summary and MAR.

## 2017-11-09 NOTE — ED NOTES
Pt is lying quietly on the stretcher in no apparent distress. Pt is alert and oriented x 4. Respirations are even and unlabored. No needs are expressed at this time. Pt's daughter is at the bedside.

## 2017-11-09 NOTE — DISCHARGE INSTRUCTIONS
Gastroenteritis: Care Instructions  Your Care Instructions    Gastroenteritis is an illness that may cause nausea, vomiting, and diarrhea. It is sometimes called \"stomach flu. \" It can be caused by bacteria or a virus. You will probably begin to feel better in 1 to 2 days. In the meantime, get plenty of rest and make sure you do not become dehydrated. Dehydration occurs when your body loses too much fluid. Follow-up care is a key part of your treatment and safety. Be sure to make and go to all appointments, and call your doctor if you are having problems. It's also a good idea to know your test results and keep a list of the medicines you take. How can you care for yourself at home? · If your doctor prescribed antibiotics, take them as directed. Do not stop taking them just because you feel better. You need to take the full course of antibiotics. · Drink plenty of fluids to prevent dehydration, enough so that your urine is light yellow or clear like water. Choose water and other caffeine-free clear liquids until you feel better. If you have kidney, heart, or liver disease and have to limit fluids, talk with your doctor before you increase your fluid intake. · Drink fluids slowly, in frequent, small amounts, because drinking too much too fast can cause vomiting. · Begin eating mild foods, such as dry toast, yogurt, applesauce, bananas, and rice. Avoid spicy, hot, or high-fat foods, and do not drink alcohol or caffeine for a day or two. Do not drink milk or eat ice cream until you are feeling better. How to prevent gastroenteritis  · Keep hot foods hot and cold foods cold. · Do not eat meats, dressings, salads, or other foods that have been kept at room temperature for more than 2 hours. · Use a thermometer to check your refrigerator. It should be between 34°F and 40°F.  · Defrost meats in the refrigerator or microwave, not on the kitchen counter. · Keep your hands and your kitchen clean.  Wash your hands, cutting boards, and countertops with hot soapy water frequently. · Cook meat until it is well done. · Do not eat raw eggs or uncooked sauces made with raw eggs. · Do not take chances. If food looks or tastes spoiled, throw it out. When should you call for help? Call 911 anytime you think you may need emergency care. For example, call if:  ? · You vomit blood or what looks like coffee grounds. ? · You passed out (lost consciousness). ? · You pass maroon or very bloody stools. ?Call your doctor now or seek immediate medical care if:  ? · You have severe belly pain. ? · You have signs of needing more fluids. You have sunken eyes, a dry mouth, and pass only a little dark urine. ? · You feel like you are going to faint. ? · You have increased belly pain that does not go away in 1 to 2 days. ? · You have new or increased nausea, or you are vomiting. ? · You have a new or higher fever. ? · Your stools are black and tarlike or have streaks of blood. ? Watch closely for changes in your health, and be sure to contact your doctor if:  ? · You are dizzy or lightheaded. ? · You urinate less than usual, or your urine is dark yellow or brown. ? · You do not feel better with each day that goes by. Where can you learn more? Go to http://mona-alla.info/. Enter N142 in the search box to learn more about \"Gastroenteritis: Care Instructions. \"  Current as of: March 3, 2017  Content Version: 11.4  © 3955-6451 [employer]. Care instructions adapted under license by [employer] (which disclaims liability or warranty for this information). If you have questions about a medical condition or this instruction, always ask your healthcare professional. [address] any warranty or liability for your use of this information.

## 2017-11-09 NOTE — ED PROVIDER NOTES
[facility].  EMERGENCY DEPARTMENT HISTORY AND PHYSICAL EXAM       Date of Service: 11/9/2017   Patient Name: Silvestre Prader   YOB: 1955  Medical Record Number: [id number]    History of Presenting Illness     Chief Complaint   Patient presents with    Abdominal Pain     Since last night around 8pm        History Provided By:  patient    Additional History:   Silvestre Prader is a 63 y.o. female with PMhx significant for HTN and hypercholesterolemia who presents ambulatory to the ED with cc of diarrhea x ~1 week, and diffuse, mild to moderate ABD pain x \"a few days. \" Pt reports she was diagnosed with cellulitis to her face (11/1/17), and prescribed Keflex and Bactrim following evaluation; she reports onset of her current symptoms after taking ~3-4 doses of her medications. She states she has been having multiple episodes of profuse, watery diarrhea daily since onset. Pt denies hx of similar symptoms. She specifically denies any fevers, chills, nausea, vomiting, chest pain, shortness of breath, headache, rash, sweating or weight loss. Social Hx: (former) Tobacco, + EtOH, - Illicit Drugs    There are no other complaints, changes or physical findings at this time.     Primary Care Provider: Guido Pepper MD     Past History     Past Medical History:   Past Medical History:   Diagnosis Date    Helicobacter pylori ab+     treated    Hypercholesteremia     Hypertension     Obesity surgery status 2009    s/p lap band Dr. Britney Peterson RLS (restless legs syndrome)     Unspecified hypothyroidism 12/31/2012        Past Surgical History:   Past Surgical History:   Procedure Laterality Date    HX CARPAL TUNNEL RELEASE      b/l    HX CATARACT REMOVAL Bilateral June and July 2014    HX CERVICAL DISKECTOMY      c5/c6 2006    HX GYN      tubal ligation    HX GYN  Jan 2014    pelvic organ prolapse surgery    HX HEENT      tonsillectomy    HX ORTHOPAEDIC      x2 trigger fingers right hand    HX OTHER SURGICAL  4/2008    lap band    HX PARTIAL HYSTERECTOMY      LAP, CHANGE ADJUST KAYLEE BAND          Family History:   Family History   Problem Relation Age of Onset    Thyroid Disease Mother      hashimoto's    Thyroid Disease Daughter      hashimoto's    Diabetes Father      type 2    Heart Disease Father 64     multiple MIs        Social History:   Social History   Substance Use Topics    Smoking status: Former Smoker     Packs/day: 1.00     Years: 2.00     Quit date: 12/30/1978    Smokeless tobacco: Never Used    Alcohol use Yes      Comment: 2 drinks weekly        Allergies: Allergies   Allergen Reactions    Aspirin Other (comments)     abd pain and cramping    Clindamycin Other (comments)     Abdominal pain    Ibuprofen Other (comments)     abd pain and cramping    Lisinopril Cough    Nsaids (Non-Steroidal Anti-Inflammatory Drug) Other (comments)     Pain and burning    Pcn [Penicillins] Rash    Tetracycline Nausea and Vomiting         Review of Systems   Review of Systems   Constitutional: Negative. Negative for activity change, appetite change, chills, fatigue, fever and unexpected weight change. HENT: Negative. Negative for congestion, hearing loss, rhinorrhea, sneezing and voice change. Eyes: Negative. Negative for pain and visual disturbance. Respiratory: Negative. Negative for apnea, cough, choking, chest tightness and shortness of breath. Cardiovascular: Negative. Negative for chest pain and palpitations. Gastrointestinal: Positive for abdominal pain (mild diffuse) and diarrhea. Negative for abdominal distention, blood in stool, nausea and vomiting. Genitourinary: Negative. Negative for difficulty urinating, flank pain, frequency and urgency. No discharge   Musculoskeletal: Negative. Negative for arthralgias, back pain, myalgias and neck stiffness. Skin: Negative. Negative for color change and rash. Neurological: Negative. Negative for dizziness, seizures, syncope, speech difficulty, weakness, numbness and headaches. Hematological: Negative for adenopathy. Psychiatric/Behavioral: Negative. Negative for agitation, behavioral problems, dysphoric mood and suicidal ideas. The patient is not nervous/anxious. Physical Exam  Physical Exam   Constitutional: She is oriented to person, place, and time. She appears well-developed and well-nourished. No distress. HENT:   Head: Normocephalic and atraumatic. Mouth/Throat: Oropharynx is clear and moist. Mucous membranes are dry. No oropharyngeal exudate. Eyes: Conjunctivae and EOM are normal. Pupils are equal, round, and reactive to light. Right eye exhibits no discharge. Left eye exhibits no discharge. Neck: Normal range of motion. Neck supple. Cardiovascular: Normal rate, regular rhythm and intact distal pulses. Exam reveals no gallop and no friction rub. No murmur heard. Pulmonary/Chest: Effort normal and breath sounds normal. No respiratory distress. She has no wheezes. She has no rales. She exhibits no tenderness. Abdominal: Soft. Bowel sounds are normal. She exhibits no distension and no mass. There is generalized tenderness (mild). There is no rebound and no guarding. Musculoskeletal: Normal range of motion. She exhibits no edema. Lymphadenopathy:     She has no cervical adenopathy. Neurological: She is alert and oriented to person, place, and time. No cranial nerve deficit. Coordination normal.   Skin: Skin is warm and dry. No rash noted. No erythema. Psychiatric: She has a normal mood and affect. Nursing note and vitals reviewed. Medical Decision Making   I am the first provider for this patient. I reviewed the vital signs, available nursing notes, past medical history, past surgical history, family history and social history. Old Medical Records: Old medical records. Nursing notes.      Provider Notes:     DDx: c diff, dehydration, gastroenteritis    ED Course:  11:31 AM  Initial assessment performed. The patients presenting problems have been discussed, and they are in agreement with the care plan formulated and outlined with them. I have encouraged them to ask questions as they arise throughout their visit. Progress Notes:     12:58 PM  I have just reevaluated the patient. I have reviewed Her vital signs and determined there is currently no worsening in their condition or physical exam.  Results have been reviewed with them and their questions have been answered. We will continue to review further results as they come available. 2:08 PM  Pt re-evaluated, states her pain is returning, and is requesting CT ABD. Diagnostic Study Results     Labs -      Recent Results (from the past 12 hour(s))   METABOLIC PANEL, COMPREHENSIVE    Collection Time: 11/09/17 12:04 PM   Result Value Ref Range    Sodium 135 (L) 136 - 145 mmol/L    Potassium 3.6 3.5 - 5.1 mmol/L    Chloride 102 97 - 108 mmol/L    CO2 20 (L) 21 - 32 mmol/L    Anion gap 13 5 - 15 mmol/L    Glucose 146 (H) 65 - 100 mg/dL    BUN 9 6 - 20 MG/DL    Creatinine 0.83 0.55 - 1.02 MG/DL    BUN/Creatinine ratio 11 (L) 12 - 20      GFR est AA >60 >60 ml/min/1.73m2    GFR est non-AA >60 >60 ml/min/1.73m2    Calcium 9.6 8.5 - 10.1 MG/DL    Bilirubin, total 0.4 0.2 - 1.0 MG/DL    ALT (SGPT) 25 12 - 78 U/L    AST (SGOT) 15 15 - 37 U/L    Alk.  phosphatase 68 45 - 117 U/L    Protein, total 7.9 6.4 - 8.2 g/dL    Albumin 3.6 3.5 - 5.0 g/dL    Globulin 4.3 (H) 2.0 - 4.0 g/dL    A-G Ratio 0.8 (L) 1.1 - 2.2     CBC WITH AUTOMATED DIFF    Collection Time: 11/09/17 12:04 PM   Result Value Ref Range    WBC 7.1 3.6 - 11.0 K/uL    RBC 5.16 3.80 - 5.20 M/uL    HGB 16.3 (H) 11.5 - 16.0 g/dL    HCT 45.9 35.0 - 47.0 %    MCV 89.0 80.0 - 99.0 FL    MCH 31.6 26.0 - 34.0 PG    MCHC 35.5 30.0 - 36.5 g/dL    RDW 13.5 11.5 - 14.5 %    PLATELET 722 094 - 596 K/uL    NEUTROPHILS 90 %    LYMPHOCYTES 8 % MONOCYTES 2 %    EOSINOPHILS 0 %    BASOPHILS 0 %    ABS. NEUTROPHILS 6.4 K/UL    ABS. LYMPHOCYTES 0.6 K/UL    ABS. MONOCYTES 0.1 K/UL    ABS. EOSINOPHILS 0.0 K/UL    ABS. BASOPHILS 0.0 K/UL    RBC COMMENTS NORMOCYTIC, NORMOCHROMIC      DF MANUAL     LACTIC ACID    Collection Time: 11/09/17 12:04 PM   Result Value Ref Range    Lactic acid 1.5 0.4 - 2.0 MMOL/L       Radiologic Studies -  The following have been ordered and reviewed:  CT Results  (Last 48 hours)               11/09/17 1454  CT ABD PELV W CONT Final result    Impression:  IMPRESSION: Ascites and fluid-filled bowel. Findings suggest enteritis. Narrative:  INDICATION: abd pain        EXAM: CT Abdomen and CT Pelvis are performed with 100 mL Isovue 370 contrast IV   without complication. CT dose reduction was achieved through use of a   standardized protocol tailored for this examination and automatic exposure   control for dose modulation. FINDINGS:    There is mild to moderate free ascites. Small bowel is diffusely dilated and   fluid-filled with mild mural thickening. The colon is nondilated although   diffusely fluid-filled. There is no ascites, mass or significant adenopathy. Gastric band is in place   without apparent complication. Liver shows no significant finding. Bile ducts   are not enlarged. Pancreas shows no mass or inflammation. Spleen is   unremarkable. Adrenal glands are normal in size. Kidneys show no mass or   hydronephrosis. Aorta shows calcification without aneurysm. The bladder is midline and the distal ureters are not dilated. There is no   apparent pelvic mass. Vital Signs-Reviewed the patient's vital signs.    Patient Vitals for the past 12 hrs:   Temp Pulse Resp BP SpO2   11/09/17 1415 - - - 129/81 93 %   11/09/17 1400 - - - 120/55 94 %   11/09/17 1345 - - - 127/63 98 %   11/09/17 1330 - - - 122/63 97 %   11/09/17 1315 - - - 123/66 95 %   11/09/17 1300 - - - 125/66 95 %   11/09/17 1245 - - - 123/60 95 %   11/09/17 1230 - - - 116/49 96 %   11/09/17 1215 - - - 133/72 91 %   11/09/17 1200 - - - 126/78 94 %   11/09/17 1145 - - - 133/76 100 %   11/09/17 1136 - - - - 100 %   11/09/17 1135 97.5 °F (36.4 °C) 70 14 142/68 100 %   11/09/17 1134 - - - 142/68 -       Medications Given in the ED:  Medications   sodium chloride 0.9 % bolus infusion 1,000 mL (0 mL IntraVENous IV Completed 11/9/17 1345)   ondansetron (ZOFRAN) injection 4 mg (4 mg IntraVENous Given 11/9/17 1203)   morphine injection 4 mg (4 mg IntraVENous Given 11/9/17 1203)   sodium chloride 0.9 % bolus infusion 1,000 mL (0 mL IntraVENous IV Completed 11/9/17 1345)   ondansetron (ZOFRAN) injection 4 mg (4 mg IntraVENous Given 11/9/17 1431)   morphine injection 4 mg (4 mg IntraVENous Given 11/9/17 1431)   iopamidol (ISOVUE-370) 76 % injection 100 mL (100 mL IntraVENous Given 11/9/17 1447)   0.9% sodium chloride infusion (50 mL/hr IntraVENous New Bag 11/9/17 1448)   sodium chloride (NS) flush 10 mL (10 mL IntraVENous Given 11/9/17 1448)       Pulse Oximetry Analysis - Normal 100% on RA     Diagnosis   Clinical Impression:   1. Gastroenteritis, acute         Plan:  1:   Follow-up Information     Follow up With Details Comments Contact Info    Guido Pepper MD Call in 2 days As needed, If symptoms worsen [address] [facility]  [phone]          2:   Current Discharge Medication List      START taking these medications    Details   dicyclomine (BENTYL) 10 mg capsule Take 1 Cap by mouth four (4) times daily for 5 days. Qty: 20 Cap, Refills: 0      diphenoxylate-atropine (LOMOTIL) 2.5-0.025 mg per tablet Take 1 Tab by mouth four (4) times daily as needed for Diarrhea. Max Daily Amount: 4 Tabs. Qty: 20 Tab, Refills: 0           Return to ED if Worse    Disposition Note:  3:19 PM  Alma Garcia Kaur's  results have been reviewed with her. She has been counseled regarding her diagnosis.   She verbally conveys understanding and agreement of the signs, symptoms, diagnosis, treatment and prognosis and additionally agrees to follow up as recommended with Dr. Jose Hawthorne MD in 24 - 48 hours. She also agrees with the care-plan and conveys that all of her questions have been answered. I have also put together some discharge instructions for her that include: 1) educational information regarding their diagnosis, 2) how to care for their diagnosis at home, as well a 3) list of reasons why they would want to return to the ED prior to their follow-up appointment, should their condition change.    _______________________________   Attestations: This note is prepared by Juan Tolbert, acting as Scribe for Jodie Vázquez MD.    Jodie Vázquez MD: The scribe's documentation has been prepared under my direction and personally reviewed by me in its entirety.  I confirm that the note above accurately reflects all work, treatment, procedures, and medical decision making performed by me.  _______________________________

## 2017-12-01 ENCOUNTER — OFFICE VISIT (OUTPATIENT)
Dept: INTERNAL MEDICINE CLINIC | Age: 62
End: 2017-12-01

## 2017-12-01 VITALS
OXYGEN SATURATION: 97 % | SYSTOLIC BLOOD PRESSURE: 120 MMHG | DIASTOLIC BLOOD PRESSURE: 66 MMHG | HEIGHT: 63 IN | WEIGHT: 152 LBS | TEMPERATURE: 97.3 F | BODY MASS INDEX: 26.93 KG/M2 | HEART RATE: 65 BPM

## 2017-12-01 DIAGNOSIS — G25.81 RLS (RESTLESS LEGS SYNDROME): ICD-10-CM

## 2017-12-01 DIAGNOSIS — E78.00 PURE HYPERCHOLESTEROLEMIA: ICD-10-CM

## 2017-12-01 DIAGNOSIS — I10 ESSENTIAL HYPERTENSION, BENIGN: Primary | ICD-10-CM

## 2017-12-01 NOTE — MR AVS SNAPSHOT
Visit Information Date & Time Provider Department Dept. Phone Encounter #  
 12/1/2017  7:45 AM Guido Pepper, [address] [place] [address],[address] [phone] [id number] Follow-up Instructions Return in about 6 months (around 6/1/2018) for cpe. Your Appointments 2/23/2018  8:50 AM  
Follow Up with MD [facility] [address]) Appt Note: 6 month f/u thyroid; 6 month f/u Thyroid [employer] [address] [id number] [address] [place] [address] Upcoming Health Maintenance Date Due COLONOSCOPY 5/4/2017 PAP AKA CERVICAL CYTOLOGY 9/17/2018 BREAST CANCER SCRN MAMMOGRAM 12/2/2018 DTaP/Tdap/Td series (2 - Td) 4/30/2022 Allergies as of 12/1/2017  Review Complete On: 11/30/2017 By: Guido Pepper MD  
  
 Severity Noted Reaction Type Reactions Bactrim [Sulfamethoprim] High 12/01/2017    Nausea and Vomiting Omnicef [Cefdinir] High 12/01/2017    Nausea and Vomiting Aspirin  02/16/2010    Other (comments)  
 abd pain and cramping Clindamycin  02/28/2014    Other (comments) Abdominal pain Ibuprofen  02/16/2010    Other (comments)  
 abd pain and cramping Lisinopril  09/18/2015    Cough Nsaids (Non-steroidal Anti-inflammatory Drug)  02/13/2012    Other (comments) Pain and burning Pcn [Penicillins]  08/18/2009    Rash Tetracycline  08/18/2009    Nausea and Vomiting Current Immunizations  Reviewed on 11/13/2014 Name Date Influenza Vaccine 10/18/2017, 9/19/2014, 9/28/2013 Influenza Vaccine Whole 10/18/2010 Tdap 4/30/2012 Varicella Virus Vaccine 2/28/2013 Not reviewed this visit You Were Diagnosed With   
  
 Codes Comments Essential hypertension, benign    -  Primary ICD-10-CM: I10 
ICD-9-CM: 401.1 Pure hypercholesterolemia     ICD-10-CM: E78.00 ICD-9-CM: 272.0 RLS (restless legs syndrome)     ICD-10-CM: G25.81 ICD-9-CM: 333.94 Vitals BP Pulse Temp Height(growth percentile) Weight(growth percentile) SpO2  
 120/66 (BP 1 Location: Left arm, BP Patient Position: Sitting) 65 97.3 °F (36.3 °C) (Oral) 5' 3\" (1.6 m) 152 lb (68.9 kg) 97% BMI OB Status Smoking Status 26.93 kg/m2 Hysterectomy Former Smoker BMI and BSA Data Body Mass Index Body Surface Area  
 26.93 kg/m 2 1.75 m 2 Preferred Pharmacy Pharmacy Name Phone [facility]Y [facility][id number] [facility] [phone] Your Updated Medication List  
  
   
This list is accurate as of: 12/1/17  8:01 AM.  Always use your most recent med list.  
  
  
  
  
 cetirizine 10 mg tablet Commonly known as:  ZyrTEC Take 1 Tab by mouth daily as needed for Allergies. clonazePAM 0.5 mg tablet Commonly known as:  Anel Broaden Take 1 Tab by mouth nightly as needed. diphenoxylate-atropine 2.5-0.025 mg per tablet Commonly known as:  LOMOTIL Take 1 Tab by mouth four (4) times daily as needed for Diarrhea. Max Daily Amount: 4 Tabs.  
  
 estradiol 0.5 mg tablet Commonly known as:  ESTRACE  
TAKE 1 TABLET BY MOUTH EVERY DAY  
  
 levothyroxine 88 mcg tablet Commonly known as:  SYNTHROID Take 1 Tab by mouth Daily (before breakfast). M-VIT PO Take 1 Tab by mouth daily. RESTASIS 0.05 % ophthalmic emulsion Generic drug:  cycloSPORINE Administer 1 Drop to both eyes two (2) times a day. simvastatin 20 mg tablet Commonly known as:  ZOCOR  
TAKE 1 TABLET NIGHTLY  
  
 valsartan 160 mg tablet Commonly known as:  DIOVAN  
TAKE 1 TABLET DAILY  
  
 zolpidem 10 mg tablet Commonly known as:  AMBIEN Take 1 Tab by mouth nightly as needed for Sleep. Max Daily Amount: 10 mg. Follow-up Instructions Return in about 6 months (around 6/1/2018) for cpe. Introducing [address]! Dear Vasquez : 
Thank you for requesting a [employer] account. Our records indicate that you already have an active [employer] account. You can access your account anytime at [email or web address] Did you know that you can access your hospital and ER discharge instructions at any time in [employer]? You can also review all of your test results from your hospital stay or ER visit. Additional Information If you have questions, please visit the Frequently Asked Questions section of the [employer] website at [email or web address] Remember, [employer] is NOT to be used for urgent needs. For medical emergencies, dial 911. Now available from your iPhone and Android! Please provide this summary of care documentation to your next provider. Your primary care clinician is listed as Gregg MOULTON. If you have any questions after today's visit, please call [phone].

## 2017-12-01 NOTE — PROGRESS NOTES
HISTORY OF PRESENT ILLNESS  Joe Duarte is a 63 y.o. female. HPI      F/u HTN HLD RLS  Last month had cellulitis of eye resolved with abx but had diarrhea afterwards  Pt was in ED last month for abdominal pain and dx with gastroenteritis after CT scan was done. Had recent labs for health screening at work   LDL 85 HDL 90 TG 68  Has been exercising at gym and running  Sees Dr Radha Cool for hypothyroidism yearly, appt next few months and will get repeat labs  Takes ambien and RLS for insomnia and RLS  bp at home 2 times /week 130's/70's   Pt is due for 10 yr colonoscopy--had neg FIT 6-17  Sees gyn Dr Albina Lucia next [facility] for well woman care    Patient Active Problem List    Diagnosis Date Noted    Dry eyes 09/18/2015    Hypothyroidism due to acquired atrophy of thyroid 12/31/2012    Essential hypertension, benign 08/18/2009    Pure hypercholesterolemia 08/18/2009    RLS (restless legs syndrome) 08/18/2009     Current Outpatient Prescriptions   Medication Sig Dispense Refill    diphenoxylate-atropine (LOMOTIL) 2.5-0.025 mg per tablet Take 1 Tab by mouth four (4) times daily as needed for Diarrhea. Max Daily Amount: 4 Tabs. 20 Tab 0    estradiol (ESTRACE) 0.5 mg tablet TAKE 1 TABLET BY MOUTH EVERY DAY  2    clonazePAM (KLONOPIN) 0.5 mg tablet Take 1 Tab by mouth nightly as needed. 90 Tab 1    zolpidem (AMBIEN) 10 mg tablet Take 1 Tab by mouth nightly as needed for Sleep. Max Daily Amount: 10 mg. 90 Tab 1    valsartan (DIOVAN) 160 mg tablet TAKE 1 TABLET DAILY 90 Tab 3    simvastatin (ZOCOR) 20 mg tablet TAKE 1 TABLET NIGHTLY 90 Tab 3    levothyroxine (SYNTHROID) 88 mcg tablet Take 1 Tab by mouth Daily (before breakfast). 90 Tab 3    cetirizine (ZYRTEC) 10 mg tablet Take 1 Tab by mouth daily as needed for Allergies. 90 Tab 3    cycloSPORINE (RESTASIS) 0.05 % ophthalmic emulsion Administer 1 Drop to both eyes two (2) times a day.       PV W-O OXANA/FERROUS FUMARATE/FA (M-VIT PO) Take 1 Tab by mouth daily. Allergies   Allergen Reactions    Aspirin Other (comments)     abd pain and cramping    Clindamycin Other (comments)     Abdominal pain    Ibuprofen Other (comments)     abd pain and cramping    Lisinopril Cough    Nsaids (Non-Steroidal Anti-Inflammatory Drug) Other (comments)     Pain and burning    Pcn [Penicillins] Rash    Tetracycline Nausea and Vomiting     Social History   Substance Use Topics    Smoking status: Former Smoker     Packs/day: 1.00     Years: 2.00     Quit date: 12/30/1978    Smokeless tobacco: Never Used    Alcohol use Yes      Comment: 2 drinks weekly      Lab Results  Component Value Date/Time   Glucose 146 11/09/2017 12:04 PM   LDL, calculated 78 05/26/2017 08:25 AM   Creatinine 0.83 11/09/2017 12:04 PM      Lab Results  Component Value Date/Time   Cholesterol, total 167 05/26/2017 08:25 AM   Cholesterol (POC) 191 02/28/2014 08:52 AM   HDL Cholesterol 77 05/26/2017 08:25 AM   LDL, calculated 78 05/26/2017 08:25 AM   LDL Cholesterol (POC) n/a 02/28/2014 08:52 AM   Triglyceride 59 05/26/2017 08:25 AM   Triglycerides (POC) <45 02/28/2014 08:52 AM   CHOL/HDL Ratio 2.4 08/09/2010 03:43 PM     Lab Results  Component Value Date/Time   GFR est non-AA >60 11/09/2017 12:04 PM   GFR est AA >60 11/09/2017 12:04 PM   Creatinine 0.83 11/09/2017 12:04 PM   BUN 9 11/09/2017 12:04 PM   Sodium 135 11/09/2017 12:04 PM   Potassium 3.6 11/09/2017 12:04 PM   Chloride 102 11/09/2017 12:04 PM   CO2 20 11/09/2017 12:04 PM          ROS    Physical Exam   Constitutional: She appears well-developed and well-nourished. Appears stated age   Cardiovascular: Normal rate, regular rhythm and normal heart sounds. Exam reveals no gallop and no friction rub. No murmur heard. Pulmonary/Chest: Effort normal and breath sounds normal. No respiratory distress. She has no wheezes. Abdominal: Soft. Bowel sounds are normal.   Musculoskeletal: She exhibits no edema. Neurological: She is alert. Psychiatric: She has a normal mood and affect. Nursing note and vitals reviewed. ASSESSMENT and PLAN  Diagnoses and all orders for this visit:    1. Essential hypertension, benign   Good control  2. Pure hypercholesterolemia   LDl at gaol on statin, continue diet and exercise  3. RLS (restless legs syndrome)   Controlled  4. Insomnia   Refill ambien when needed  5.  Hypothyroid    F/u Dr Makeda Tolentino  Follow-up Disposition: Not on File

## 2017-12-27 DIAGNOSIS — E03.4 HYPOTHYROIDISM DUE TO ACQUIRED ATROPHY OF THYROID: ICD-10-CM

## 2017-12-30 LAB
T4 FREE SERPL-MCNC: 1.29 NG/DL (ref 0.82–1.77)
TSH SERPL DL<=0.005 MIU/L-ACNC: 2.76 UIU/ML (ref 0.45–4.5)

## 2018-01-21 DIAGNOSIS — F51.01 PRIMARY INSOMNIA: ICD-10-CM

## 2018-01-22 RX ORDER — ZOLPIDEM TARTRATE 10 MG/1
10 TABLET ORAL
Qty: 90 TAB | Refills: 1 | Status: SHIPPED | OUTPATIENT
Start: 2018-01-22 | End: 2018-08-08 | Stop reason: SDUPTHER

## 2018-01-22 NOTE — TELEPHONE ENCOUNTER
From: Maximilian Daniels  To: Mario Madera MD  Sent: 1/21/2018 1:41 PM EST  Subject: Medication Renewal Request    Original authorizing provider: MD Maximilian Paz would like a refill of the following medications:  zolpidem (AMBIEN) 10 mg tablet Mario Madera MD]    Preferred pharmacy: [facility]/PHARMACY #[id number] - [address], [address] [place] [address]:

## 2018-02-04 DIAGNOSIS — G25.81 RLS (RESTLESS LEGS SYNDROME): ICD-10-CM

## 2018-02-05 RX ORDER — CLONAZEPAM 0.5 MG/1
0.5 TABLET ORAL
Qty: 90 TAB | Refills: 1 | Status: SHIPPED | OUTPATIENT
Start: 2018-02-05 | End: 2018-08-29 | Stop reason: SDUPTHER

## 2018-02-05 NOTE — TELEPHONE ENCOUNTER
From: Aura Ford  To: Enmanuel Vasquez MD  Sent: 2/4/2018 9:56 PM EST  Subject: Medication Renewal Request    Original authorizing provider: MD Aura Adler would like a refill of the following medications:  clonazePAM (KLONOPIN) 0.5 mg tablet Enmanuel Vasquez MD]    Preferred pharmacy: [facility]/PHARMACY #[id number] - [address], [address] [place] [address]:

## 2018-02-23 ENCOUNTER — OFFICE VISIT (OUTPATIENT)
Dept: ENDOCRINOLOGY | Age: 63
End: 2018-02-23

## 2018-02-23 VITALS
BODY MASS INDEX: 28.21 KG/M2 | WEIGHT: 159.2 LBS | HEART RATE: 67 BPM | SYSTOLIC BLOOD PRESSURE: 129 MMHG | DIASTOLIC BLOOD PRESSURE: 74 MMHG | HEIGHT: 63 IN

## 2018-02-23 DIAGNOSIS — E03.4 HYPOTHYROIDISM DUE TO ACQUIRED ATROPHY OF THYROID: Primary | ICD-10-CM

## 2018-02-23 RX ORDER — LEVOTHYROXINE SODIUM 88 UG/1
TABLET ORAL
Qty: 28 TAB | Refills: 0 | Status: SHIPPED | COMMUNITY
Start: 2018-02-23 | End: 2018-03-15

## 2018-02-23 NOTE — PROGRESS NOTES
Chief Complaint   Patient presents with    Thyroid Problem     pcp and pharmacy confirmed     History of Present Illness: Siva Duenas is a 66 y.o. female here for follow up of thyroid. Weight down 2 lbs since last visit in 10/16. Since 12/17 has been taking levothyroxine 7.5 tabs/week and has been getting this everyday before breakfast with just water at least 30 min before food. Hasn't noticed too much difference in how she feels on the higher dose. No chest pain or palpitations. Bowels are regular. No hair loss or brittle nails. Some dry skin but not more than usual for the winter. Not as cold as she used to be. Willing to try brand synthroid to see if she feels better. Current Outpatient Prescriptions   Medication Sig    clonazePAM (KLONOPIN) 0.5 mg tablet Take 1 Tab by mouth nightly as needed.  zolpidem (AMBIEN) 10 mg tablet Take 1 Tab by mouth nightly as needed for Sleep. Max Daily Amount: 10 mg.    estradiol (ESTRACE) 0.5 mg tablet TAKE 1 TABLET BY MOUTH EVERY DAY    valsartan (DIOVAN) 160 mg tablet TAKE 1 TABLET DAILY    simvastatin (ZOCOR) 20 mg tablet TAKE 1 TABLET NIGHTLY    levothyroxine (SYNTHROID) 88 mcg tablet Take 1 Tab by mouth Daily (before breakfast).  cetirizine (ZYRTEC) 10 mg tablet Take 1 Tab by mouth daily as needed for Allergies.  cycloSPORINE (RESTASIS) 0.05 % ophthalmic emulsion Administer 1 Drop to both eyes two (2) times a day.  PV W-O OXANA/FERROUS FUMARATE/FA (M-VIT PO) Take 1 Tab by mouth daily. No current facility-administered medications for this visit.       Allergies   Allergen Reactions    Bactrim [Sulfamethoprim] Nausea and Vomiting    Omnicef [Cefdinir] Nausea and Vomiting    Aspirin Other (comments)     abd pain and cramping    Clindamycin Other (comments)     Abdominal pain    Ibuprofen Other (comments)     abd pain and cramping    Lisinopril Cough    Nsaids (Non-Steroidal Anti-Inflammatory Drug) Other (comments)     Pain and burning    Pcn [Penicillins] Rash    Tetracycline Nausea and Vomiting     Review of Systems:  - Cardiovascular: no chest pain  - Neurological: no tremors  - Integumentary: skin is normal    Physical Examination:  Blood pressure 129/74, pulse 67, height 5' 3\" (1.6 m), weight 159 lb 3.2 oz (72.2 kg). - General: pleasant, no distress, good eye contact   - Neck: small goiter  - Cardiovascular: regular, normal rate, nl s1 and s2, no m/r/g   - Respiratory: clear to auscultation bilaterally   - Integumentary: skin is normal, no edema  - Neurological: reflexes 2+ at biceps, no tremors  - Psychiatric: normal mood and affect    Data Reviewed:   Component      Latest Ref Rng & Units 2/13/2018 2/13/2018           8:26 AM  8:26 AM   TSH      0.450 - 4.500 uIU/mL  1.120   T4, Free      0.82 - 1.77 ng/dL 1.14        Assessment/Plan:     1. Unspecified hypothyroidism: She has a FH of hashimoto's disease and her TSH was mildly high at 5.19 in 8/12 after previously having normal values in Feb 2010-12. I repeated her TSH and it was 3.67 in 12/12 with a normal TPO ab but we decided to give a 3 month trial of levothyroxine 50 mcg daily and she felt much better and TSH was down to 1.63 in 4/13 and the same in 10/13 and 1.2 in 10/14. TSH up to 3.76 in 7/15 so increased her dose to 75 mcg daily and TSH down to 0.9 in 10/15 so kept her dose the same. TSH up to 2.68 in 10/16 so I increased her dose to 88 mcg daily and TSH 0.95 in 12/16. TSH down to 0.85 in 5/17 with Dr. Nancie Arrieta so he decreased her dose to 6.5 tabs/week and TSH up to 2.65 in 7/17 so went back to 1 tab daily and TSH 2.76 in 12/16 and I increased her dose to 7.5 tabs/week and TSH down to 1. 12.  It's possible some of her fluctuation is from generic so will give a trial of brand synthroid to see if she feels better on this.   - trial of synthroid 88 mcg 7.5 tabs/week  - check TSH and free T4 prior to next visit        Patient Instructions   1) TSH is a thyroid test.  Your level is 1.12 which is normal and at goal of 0.5-2.0. This test goes opposite of your thyroid dose and suggests your dose of levothyroxine is likely adequate but because of the variability with generic, sometimes your level will fluctuate and you will feel better with brand medication. 2) We will try brand synthroid at the same dose of 88 mcg 7.5 tabs/week for the next 4 weeks. If you are feeling better, then e-mail me in about 2-3 week and we'll send a supply to [facility] but will write as generic but they'll dispense brand. 3) I will plan on seeing you back in one year but if you feel you need to have your labs checked sooner, please let me know. 4) I will send you a reminder e-mail 3-4 weeks prior to your next visit and you will have the order already in the [facility] Mobile system so you can just go sometime in the 3-7 days before the next visit to have your labs drawn. Follow-up Disposition:  Return in about 1 year (around 2/23/2019).     Copy sent to:  Dr. Dillon Christine via [facility]  Dr. Salo Richardson [phone]

## 2018-02-23 NOTE — MR AVS SNAPSHOT
[address] [facility] [address] [address] [phone] Patient: Seng Gomez MRN: [id number] FKA:0/6/[birth year]244 Visit Information Date & Time Provider Department Dept. Phone Encounter #  
 2/23/2018  8:50 AM MD [facility] [phone] [id number] Follow-up Instructions Return in about 1 year (around 2/23/2019). Your Appointments 6/1/2018  7:45 AM  
ROUTINE CARE with Hammad Rob MD  
[facility] [address]) Appt Note: 6 month f/u  
 [address] [id number]  
[address] [facility] [address] [place] [address] Upcoming Health Maintenance Date Due COLONOSCOPY 5/4/2017 FOBT Q 1 YEAR, 18+ 6/21/2018 PAP AKA CERVICAL CYTOLOGY 9/17/2018 BREAST CANCER SCRN MAMMOGRAM 12/2/2018 DTaP/Tdap/Td series (2 - Td) 4/30/2022 Allergies as of 2/23/2018  Review Complete On: 2/23/2018 By: Karen Madera MD  
  
 Severity Noted Reaction Type Reactions Bactrim [Sulfamethoprim] High 12/01/2017    Nausea and Vomiting Omnicef [Cefdinir] High 12/01/2017    Nausea and Vomiting Aspirin  02/16/2010    Other (comments)  
 abd pain and cramping Clindamycin  02/28/2014    Other (comments) Abdominal pain Ibuprofen  02/16/2010    Other (comments)  
 abd pain and cramping Lisinopril  09/18/2015    Cough Nsaids (Non-steroidal Anti-inflammatory Drug)  02/13/2012    Other (comments) Pain and burning Pcn [Penicillins]  08/18/2009    Rash Tetracycline  08/18/2009    Nausea and Vomiting Current Immunizations  Reviewed on 11/13/2014 Name Date Influenza Vaccine 10/18/2017, 9/19/2014, 9/28/2013 Influenza Vaccine Whole 10/18/2010 Tdap 4/30/2012 Varicella Virus Vaccine 2/28/2013 Not reviewed this visit You Were Diagnosed With   
  
 Codes Comments Hypothyroidism due to acquired atrophy of thyroid    -  Primary ICD-10-CM: E03.4 ICD-9-CM: 244.8, 246.8 Vitals BP Pulse Height(growth percentile) Weight(growth percentile) BMI OB Status 129/74 67 5' 3\" (1.6 m) 159 lb 3.2 oz (72.2 kg) 28.2 kg/m2 Hysterectomy Smoking Status Former Smoker Vitals History BMI and BSA Data Body Mass Index Body Surface Area  
 28.2 kg/m 2 1.79 m 2 Preferred Pharmacy Pharmacy Name Phone [facility]ACY [facility][phone] [facility] [phone] Your Updated Medication List  
  
   
This list is accurate as of 2/23/18  9:22 AM.  Always use your most recent med list.  
  
  
  
  
 cetirizine 10 mg tablet Commonly known as:  ZyrTEC Take 1 Tab by mouth daily as needed for Allergies. clonazePAM 0.5 mg tablet Commonly known as:  Suann Dhruv Take 1 Tab by mouth nightly as needed. estradiol 0.5 mg tablet Commonly known as:  ESTRACE  
TAKE 1 TABLET BY MOUTH EVERY DAY  
  
 * levothyroxine 88 mcg tablet Commonly known as:  SYNTHROID Take 1 Tab by mouth Daily (before breakfast). * SYNTHROID 88 mcg tablet Generic drug:  levothyroxine Take 1 tab 6 days a week and 1.5 tabs once a week M-VIT PO Take 1 Tab by mouth daily. RESTASIS 0.05 % ophthalmic emulsion Generic drug:  cycloSPORINE Administer 1 Drop to both eyes two (2) times a day. simvastatin 20 mg tablet Commonly known as:  ZOCOR  
TAKE 1 TABLET NIGHTLY  
  
 valsartan 160 mg tablet Commonly known as:  DIOVAN  
TAKE 1 TABLET DAILY  
  
 zolpidem 10 mg tablet Commonly known as:  AMBIEN Take 1 Tab by mouth nightly as needed for Sleep. Max Daily Amount: 10 mg.  
  
 * Notice: This list has 2 medication(s) that are the same as other medications prescribed for you. Read the directions carefully, and ask your doctor or other care provider to review them with you. Follow-up Instructions Return in about 1 year (around 2/23/2019). To-Do List   
 01/23/2019 Lab:  T4, FREE   
  
 01/23/2019 Lab:  TSH 3RD GENERATION Patient Instructions 1) TSH is a thyroid test.  Your level is 1.12 which is normal and at goal of 0.5-2.0. This test goes opposite of your thyroid dose and suggests your dose of levothyroxine is likely adequate but because of the variability with generic, sometimes your level will fluctuate and you will feel better with brand medication. 2) We will try brand synthroid at the same dose of 88 mcg 7.5 tabs/week for the next 4 weeks. If you are feeling better, then e-mail me in about 2-3 week and we'll send a supply to [facility] but will write as generic but they'll dispense brand. 3) I will plan on seeing you back in one year but if you feel you need to have your labs checked sooner, please let me know. 4) I will send you a reminder e-mail 3-4 weeks prior to your next visit and you will have the order already in the [facility] system so you can just go sometime in the 3-7 days before the next visit to have your labs drawn. Introducing [facility] & HEALTH SERVICES! Dear Clinton Luo: 
Thank you for requesting a Review[employer] account. Our records indicate that you already have an active ReviewZAP account. You can access your account anytime at [email or web address] Did you know that you can access your hospital and ER discharge instructions at any time in ReviewZAP? You can also review all of your test results from your hospital stay or ER visit. Additional Information If you have questions, please visit the Frequently Asked Questions section of the Review[employer] website at [email or web address] Remember, Review[employer]AP is NOT to be used for urgent needs. For medical emergencies, dial 911. Now available from your iPhone and Android! Please provide this summary of care documentation to your next provider. Your primary care clinician is listed as Sheela MOULTON. If you have any questions after today's visit, please call [phone].

## 2018-02-23 NOTE — PATIENT INSTRUCTIONS
1) TSH is a thyroid test.  Your level is 1.12 which is normal and at goal of 0.5-2.0. This test goes opposite of your thyroid dose and suggests your dose of levothyroxine is likely adequate but because of the variability with generic, sometimes your level will fluctuate and you will feel better with brand medication. 2) We will try brand synthroid at the same dose of 88 mcg 7.5 tabs/week for the next 4 weeks. If you are feeling better, then e-mail me in about 2-3 week and we'll send a supply to [facility] but will write as generic but they'll dispense brand. 3) I will plan on seeing you back in one year but if you feel you need to have your labs checked sooner, please let me know. 4) I will send you a reminder e-mail 3-4 weeks prior to your next visit and you will have the order already in the [facility]unt[facility] system so you can just go sometime in the 3-7 days before the next visit to have your labs drawn.

## 2018-03-05 RX ORDER — ESCITALOPRAM OXALATE 10 MG/1
10 TABLET ORAL DAILY
Qty: 30 TAB | Refills: 0 | Status: SHIPPED | OUTPATIENT
Start: 2018-03-05 | End: 2018-03-23 | Stop reason: SDUPTHER

## 2018-03-15 ENCOUNTER — TELEPHONE (OUTPATIENT)
Dept: ENDOCRINOLOGY | Age: 63
End: 2018-03-15

## 2018-03-15 ENCOUNTER — PATIENT MESSAGE (OUTPATIENT)
Dept: INTERNAL MEDICINE CLINIC | Age: 63
End: 2018-03-15

## 2018-03-15 RX ORDER — LEVOTHYROXINE SODIUM 88 UG/1
TABLET ORAL
Qty: 96 TAB | Refills: 3 | Status: SHIPPED | OUTPATIENT
Start: 2018-03-15 | End: 2018-08-07 | Stop reason: SDUPTHER

## 2018-03-23 ENCOUNTER — OFFICE VISIT (OUTPATIENT)
Dept: INTERNAL MEDICINE CLINIC | Age: 63
End: 2018-03-23

## 2018-03-23 VITALS
HEIGHT: 63 IN | SYSTOLIC BLOOD PRESSURE: 141 MMHG | WEIGHT: 162 LBS | BODY MASS INDEX: 28.7 KG/M2 | HEART RATE: 55 BPM | OXYGEN SATURATION: 98 % | TEMPERATURE: 97.6 F | DIASTOLIC BLOOD PRESSURE: 71 MMHG

## 2018-03-23 DIAGNOSIS — F32.1 MODERATE SINGLE CURRENT EPISODE OF MAJOR DEPRESSIVE DISORDER (HCC): Primary | ICD-10-CM

## 2018-03-23 RX ORDER — ESCITALOPRAM OXALATE 10 MG/1
10 TABLET ORAL DAILY
Qty: 90 TAB | Refills: 3 | Status: SHIPPED | OUTPATIENT
Start: 2018-03-23 | End: 2019-03-14 | Stop reason: SDUPTHER

## 2018-03-23 NOTE — PROGRESS NOTES
HISTORY OF PRESENT ILLNESS  Neptali Jamison is a 66 y.o. female. HPI     Pt here for symptoms of depression-sadness, irritability, lack of interest  lexapro 10 mg every day was sent to pharmacy on 3-5-18  Less irritable,   Just recognizing that there is a problem has helped    No SI/HI  Rates MDD as moderate for 6-8 months    Patient Active Problem List    Diagnosis Date Noted    Dry eyes 09/18/2015    Hypothyroidism due to acquired atrophy of thyroid 12/31/2012    Essential hypertension, benign 08/18/2009    Pure hypercholesterolemia 08/18/2009    RLS (restless legs syndrome) 08/18/2009     Current Outpatient Prescriptions   Medication Sig Dispense Refill    levothyroxine (SYNTHROID) 88 mcg tablet Take 1 tab 6 days a week and 1.5 tabs once a week 96 Tab 3    escitalopram oxalate (LEXAPRO) 10 mg tablet Take 1 Tab by mouth daily. 30 Tab 0    clonazePAM (KLONOPIN) 0.5 mg tablet Take 1 Tab by mouth nightly as needed. 90 Tab 1    zolpidem (AMBIEN) 10 mg tablet Take 1 Tab by mouth nightly as needed for Sleep. Max Daily Amount: 10 mg. 90 Tab 1    estradiol (ESTRACE) 0.5 mg tablet TAKE 1 TABLET BY MOUTH EVERY DAY  2    valsartan (DIOVAN) 160 mg tablet TAKE 1 TABLET DAILY 90 Tab 3    simvastatin (ZOCOR) 20 mg tablet TAKE 1 TABLET NIGHTLY 90 Tab 3    cetirizine (ZYRTEC) 10 mg tablet Take 1 Tab by mouth daily as needed for Allergies. 90 Tab 3    cycloSPORINE (RESTASIS) 0.05 % ophthalmic emulsion Administer 1 Drop to both eyes two (2) times a day.  PV W-O OXANA/FERROUS FUMARATE/FA (M-VIT PO) Take 1 Tab by mouth daily.        Allergies   Allergen Reactions    Bactrim [Sulfamethoprim] Nausea and Vomiting    Omnicef [Cefdinir] Nausea and Vomiting    Aspirin Other (comments)     abd pain and cramping    Clindamycin Other (comments)     Abdominal pain    Ibuprofen Other (comments)     abd pain and cramping    Lisinopril Cough    Nsaids (Non-Steroidal Anti-Inflammatory Drug) Other (comments)     Pain and burning    Pcn [Penicillins] Rash    Tetracycline Nausea and Vomiting      Lab Results  Component Value Date/Time   GFR est non-AA >60 11/09/2017 12:04 PM   GFR est AA >60 11/09/2017 12:04 PM   Creatinine 0.83 11/09/2017 12:04 PM   BUN 9 11/09/2017 12:04 PM   Sodium 135 (L) 11/09/2017 12:04 PM   Potassium 3.6 11/09/2017 12:04 PM   Chloride 102 11/09/2017 12:04 PM   CO2 20 (L) 11/09/2017 12:04 PM        ROS    Physical Exam   Constitutional: She appears well-developed and well-nourished. Appears stated age   Cardiovascular: Normal rate, regular rhythm and normal heart sounds. Exam reveals no gallop and no friction rub. No murmur heard. Pulmonary/Chest: Effort normal and breath sounds normal. No respiratory distress. She has no wheezes. Abdominal: Soft. Bowel sounds are normal.   Musculoskeletal: She exhibits no edema. Neurological: She is alert. Psychiatric: She has a normal mood and affect. Judgment and thought content normal.   Nursing note and vitals reviewed. ASSESSMENT and PLAN  Diagnoses and all orders for this visit:    1. Moderate single current episode of major depressive disorder ([facility] [address]  -     [address] Psychiat[facility]   PHQ-2 done-mild after taking lexapro x 2 weeks   Refilled lexapro   Recommended [place]ing at [address] with psychologist at Dr Kervin Gilman office-pt appears agreeable    Other orders  -     escitalopram oxalate (LEXAPRO) 10 mg tablet; Take 1 Tab by mouth daily. Follow-up Disposition:  Return in about 3 months (around 6/23/2018).

## 2018-03-23 NOTE — PROGRESS NOTES
Chief Complaint   Patient presents with    Cholesterol Problem    Hypertension    Labs    Medication Refill     lexapro    Depression      Health Maintenance   Topic Date Due    COLONOSCOPY  05/04/2017    FOBT Q 1 YEAR, 18+  06/21/2018    PAP AKA CERVICAL CYTOLOGY  09/17/2018    BREAST CANCER SCRN MAMMOGRAM  12/02/2018    DTaP/Tdap/Td series (2 - Td) 04/30/2022    Hepatitis C Screening  Completed    ZOSTER VACCINE AGE 60>  Completed    Influenza Age 5 to Adult  Completed     Health Maintenance Review

## 2018-03-23 NOTE — MR AVS SNAPSHOT
[address] 
[phone] Patient: Gerald Shafer MRN: [id number] CJF:[birth year] Visit Information Date & Time Provider Department Dept. Phone Encounter #  
 3/23/2018  7:45 AM Phuong Anderson, [address] [facility] [address] [phone] [id number] Follow-up Instructions Return in about 3 months (around 6/23/2018). Your Appointments 6/1/2018  7:45 AM  
ROUTINE CARE with Phuong Anderson MD  
[facility] [address]) Appt Note: 6 month f/u  
 [facility] [address] [place] [address] [facility] [address]  
  
   
 [facility] [address] [facility] [address]  
  
    
 2/22/2019  8:30 AM  
Follow Up with Patti Gonzalez MD  
[facility] [address] [facility]) Appt Note: 1 year f/u  
 [address] [facility] [address] [phone] [address] [place] [address] Upcoming Health Maintenance Date Due COLONOSCOPY 5/4/2017 FOBT Q 1 YEAR, 18+ 6/21/2018 PAP AKA CERVICAL CYTOLOGY 9/17/2018 BREAST CANCER SCRN MAMMOGRAM 12/2/2018 DTaP/Tdap/Td series (2 - Td) 4/30/2022 Allergies as of 3/23/2018  Review Complete On: 3/23/2018 By: Gricelda Ramirez LPN Severity Noted Reaction Type Reactions Bactrim [Sulfamethoprim] High 12/01/2017    Nausea and Vomiting Omnicef [Cefdinir] High 12/01/2017    Nausea and Vomiting Aspirin  02/16/2010    Other (comments)  
 abd pain and cramping Clindamycin  02/28/2014    Other (comments) Abdominal pain Ibuprofen  02/16/2010    Other (comments)  
 abd pain and cramping Lisinopril  09/18/2015    Cough Nsaids (Non-steroidal Anti-inflammatory Drug)  02/13/2012    Other (comments) Pain and burning Pcn [Penicillins]  08/18/2009    Rash Tetracycline  08/18/2009    Nausea and Vomiting Current Immunizations  Reviewed on 11/13/2014 Name Date Influenza Vaccine 10/18/2017, 9/19/2014, 9/28/2013 Influenza Vaccine Whole 10/18/2010 Tdap 4/30/2012 Varicella Virus Vaccine 2/28/2013 Not reviewed this visit You Were Diagnosed With   
  
 Codes Comments Moderate single current episode of major depressive disorder ([facility] [address]    -  Primary ICD-10-CM: F32.1 ICD-9-CM: 296.22 Vitals BP Pulse Temp Height(growth percentile) Weight(growth percentile) SpO2  
 141/71 (BP 1 Location: Left arm, BP Patient Position: Sitting) (!) 55 97.6 °F (36.4 °C) (Oral) 5' 3\" (1.6 m) 162 lb (73.5 kg) 98% BMI OB Status Smoking Status 28.7 kg/m2 Hysterectomy Former Smoker BMI and BSA Data Body Mass Index Body Surface Area 28.7 kg/m 2 1.81 m 2 Preferred Pharmacy Pharmacy Name Phone [facility]/PHARMACY #[id number] [facility] [phone] Your Updated Medication List  
  
   
This list is accurate as of 3/23/18  8:10 AM.  Always use your most recent med list.  
  
  
  
  
 cetirizine 10 mg tablet Commonly known as:  ZyrTEC Take 1 Tab by mouth daily as needed for Allergies. clonazePAM 0.5 mg tablet Commonly known as:  Merilee Blind Take 1 Tab by mouth nightly as needed. escitalopram oxalate 10 mg tablet Commonly known as:  Raf Scott Take 1 Tab by mouth daily. estradiol 0.5 mg tablet Commonly known as:  ESTRACE  
TAKE 1 TABLET BY MOUTH EVERY DAY  
  
 levothyroxine 88 mcg tablet Commonly known as:  SYNTHROID Take 1 tab 6 days a week and 1.5 tabs once a week M-VIT PO Take 1 Tab by mouth daily. RESTASIS 0.05 % ophthalmic emulsion Generic drug:  cycloSPORINE Administer 1 Drop to both eyes two (2) times a day. simvastatin 20 mg tablet Commonly known as:  ZOCOR  
TAKE 1 TABLET NIGHTLY  
  
 valsartan 160 mg tablet Commonly known as:  DIOVAN  
 TAKE 1 TABLET DAILY  
  
 zolpidem 10 mg tablet Commonly known as:  AMBIEN Take 1 Tab by mouth nightly as needed for Sleep. Max Daily Amount: 10 mg.  
  
  
  
  
Prescriptions Sent to Pharmacy Refills  
 escitalopram oxalate (LEXAPRO) 10 mg tablet 3 Sig: Take 1 Tab by mouth daily. Class: Normal  
 Pharmacy: [place] [id number], [address] [place] [address] #: [phone] Route: Oral  
  
We Performed the Following REFERRAL TO PSYCHIATRY [REF91 Custom] Follow-up Instructions Return in about 3 months (around 6/23/2018). Referral Information Referral ID Referred By Referred To  
  
 [id number] Génesis Ma MD   
   [address] [facility] [address] [place], [address] Phone: [phone] Fax: [phone] Visits Status Start Date End Date 1 New Request 3/23/18 3/23/19 If your referral has a status of pending review or denied, additional information will be sent to support the outcome of this decision. Introducing [facility] & HEALTH SERVICES! Dear Edouard Child: 
Thank you for requesting a [employer] account. Our records indicate that you already have an active [employer] account. You can access your account anytime at [email or web address] Did you know that you can access your hospital and ER discharge instructions at any time in [employer]? You can also review all of your test results from your hospital stay or ER visit. Additional Information If you have questions, please visit the Frequently Asked Questions section of the [employer] website at [email or web address] Remember, [employer] is NOT to be used for urgent needs. For medical emergencies, dial 911. Now available from your iPhone and Android! Please provide this summary of care documentation to your next provider. Your primary care clinician is listed as Andres MOULTON. If you have any questions after today's visit, please call [phone].

## 2018-06-01 ENCOUNTER — OFFICE VISIT (OUTPATIENT)
Dept: INTERNAL MEDICINE CLINIC | Age: 63
End: 2018-06-01

## 2018-06-01 VITALS
SYSTOLIC BLOOD PRESSURE: 125 MMHG | HEIGHT: 63 IN | HEART RATE: 52 BPM | TEMPERATURE: 97.7 F | BODY MASS INDEX: 28.17 KG/M2 | WEIGHT: 159 LBS | DIASTOLIC BLOOD PRESSURE: 71 MMHG | OXYGEN SATURATION: 97 %

## 2018-06-01 DIAGNOSIS — Z12.11 COLON CANCER SCREENING: ICD-10-CM

## 2018-06-01 DIAGNOSIS — F32.1 CURRENT MODERATE EPISODE OF MAJOR DEPRESSIVE DISORDER WITHOUT PRIOR EPISODE (HCC): ICD-10-CM

## 2018-06-01 DIAGNOSIS — I10 ESSENTIAL HYPERTENSION, BENIGN: Primary | ICD-10-CM

## 2018-06-01 DIAGNOSIS — E78.00 PURE HYPERCHOLESTEROLEMIA: ICD-10-CM

## 2018-06-01 NOTE — PROGRESS NOTES
Chief Complaint   Patient presents with    Cholesterol Problem     6 month follow up    Hypertension     6 month follow up    Thyroid Problem     6 month follow up    Labs     Non fasting

## 2018-06-01 NOTE — PROGRESS NOTES
HISTORY OF PRESENT ILLNESS  Brice Lloyd is a 66 y.o. female. HPI   F/u MDD , htn hld hypothyroid  Will lipid in August through hospital   Takes [place] 4 d per week when gets off work and clonazepam prn RLS maybe 3-4 d per week     Last OV:    Pt here for symptoms of depression-sadness, irritability, lack of interest  lexapro 10 mg every day was sent to pharmacy on 3-5-18  Less irritable,   Just recognizing that there is a problem has helped     No SI/HI  Rates MDD as moderate for 6-8 months     Patient Active Problem List    Diagnosis Date Noted    Dry eyes 09/18/2015    Hypothyroidism due to acquired atrophy of thyroid 12/31/2012    Essential hypertension, benign 08/18/2009    Pure hypercholesterolemia 08/18/2009    RLS (restless legs syndrome) 08/18/2009     Current Outpatient Prescriptions   Medication Sig Dispense Refill    escitalopram oxalate (LEXAPRO) 10 mg tablet Take 1 Tab by mouth daily. 90 Tab 3    levothyroxine (SYNTHROID) 88 mcg tablet Take 1 tab 6 days a week and 1.5 tabs once a week 96 Tab 3    clonazePAM (KLONOPIN) 0.5 mg tablet Take 1 Tab by mouth nightly as needed. 90 Tab 1    zolpidem (AMBIEN) 10 mg tablet Take 1 Tab by mouth nightly as needed for Sleep. Max Daily Amount: 10 mg. 90 Tab 1    estradiol (ESTRACE) 0.5 mg tablet TAKE 1 TABLET BY MOUTH EVERY DAY  2    valsartan (DIOVAN) 160 mg tablet TAKE 1 TABLET DAILY 90 Tab 3    simvastatin (ZOCOR) 20 mg tablet TAKE 1 TABLET NIGHTLY 90 Tab 3    cetirizine (ZYRTEC) 10 mg tablet Take 1 Tab by mouth daily as needed for Allergies. 90 Tab 3    cycloSPORINE (RESTASIS) 0.05 % ophthalmic emulsion Administer 1 Drop to both eyes two (2) times a day.  PV W-O OXANA/FERROUS FUMARATE/FA (M-VIT PO) Take 1 Tab by mouth daily.        Allergies   Allergen Reactions    Bactrim [Sulfamethoprim] Nausea and Vomiting    Omnicef [Cefdinir] Nausea and Vomiting    Aspirin Other (comments)     abd pain and cramping    Clindamycin Other (comments) Abdominal pain    Ibuprofen Other (comments)     abd pain and cramping    Lisinopril Cough    Nsaids (Non-Steroidal Anti-Inflammatory Drug) Other (comments)     Pain and burning    Pcn [Penicillins] Rash    Tetracycline Nausea and Vomiting      Lab Results  Component Value Date/Time   Glucose 146 (H) 11/09/2017 12:04 PM   LDL, calculated 78 05/26/2017 08:25 AM   Creatinine 0.83 11/09/2017 12:04 PM      Lab Results  Component Value Date/Time   Cholesterol, total 167 05/26/2017 08:25 AM   Cholesterol (POC) 191 02/28/2014 08:52 AM   HDL Cholesterol 77 05/26/2017 08:25 AM   LDL, calculated 78 05/26/2017 08:25 AM   LDL Cholesterol (POC) n/a 02/28/2014 08:52 AM   Triglyceride 59 05/26/2017 08:25 AM   Triglycerides (POC) <45 02/28/2014 08:52 AM   CHOL/HDL Ratio 2.4 08/09/2010 03:43 PM     Lab Results  Component Value Date/Time   GFR est non-AA >60 11/09/2017 12:04 PM   GFR est AA >60 11/09/2017 12:04 PM   Creatinine 0.83 11/09/2017 12:04 PM   BUN 9 11/09/2017 12:04 PM   Sodium 135 (L) 11/09/2017 12:04 PM   Potassium 3.6 11/09/2017 12:04 PM   Chloride 102 11/09/2017 12:04 PM   CO2 20 (L) 11/09/2017 12:04 PM        ROS    Physical Exam   Constitutional: She appears well-developed and well-nourished. Appears stated age   Cardiovascular: Normal rate, regular rhythm and normal heart sounds. Exam reveals no gallop and no friction rub. No murmur heard. Pulmonary/Chest: Effort normal and breath sounds normal. No respiratory distress. She has no wheezes. Abdominal: Soft. Bowel sounds are normal.   Musculoskeletal: She exhibits no edema. Neurological: She is alert. Psychiatric: She has a normal mood and affect. Nursing note and vitals reviewed. ASSESSMENT and PLAN  Diagnoses and all orders for this visit:    1. Essential hypertension, benign   Good control  2. Pure hypercholesterolemia   Continue statin, pt will get lipid panel at work in 1-2 mos  3.  Current moderate episode of major depressive disorder without prior episode ([facility]   Controlled, improved PHQ-2    Continue lexapro  4. Colon cancer screening  -     OCCULT BLOOD, IMMUNOASSAY (FIT)      Follow-up Disposition:  Return in about 6 months (around 12/1/2018) for htn hld thyroid MDD.

## 2018-06-01 NOTE — MR AVS SNAPSHOT
[address] [facility] 
[phone] Patient: Carmen Cesar MRN: [id number] ORE:[birth year] Visit Information Date & Time Provider Department Dept. Phone Encounter #  
 6/1/2018  7:45 AM Gina Ramirez, [address] [place] [address],[address] [phone] [id number] Follow-up Instructions Return in about 6 months (around 12/1/2018) for htn hld thyroid MDD. Your Appointments 2/22/2019  8:30 AM  
Follow Up with MD [facility] [address]) Appt Note: 1 year f/u  
 [address] [facility] [address] [id number] [address] [facility] Upcoming Health Maintenance Date Due COLONOSCOPY 5/4/2017 PAP AKA CERVICAL CYTOLOGY 9/17/2018 FOBT Q 1 YEAR, 18+ 6/21/2018 Influenza Age 5 to Adult 8/1/2018 BREAST CANCER SCRN MAMMOGRAM 12/2/2018 DTaP/Tdap/Td series (2 - Td) 4/30/2022 Allergies as of 6/1/2018  Review Complete On: 6/1/2018 By: Nikhil Ireland LPN Severity Noted Reaction Type Reactions Bactrim [Sulfamethoprim] High 12/01/2017    Nausea and Vomiting Omnicef [Cefdinir] High 12/01/2017    Nausea and Vomiting Aspirin  02/16/2010    Other (comments)  
 abd pain and cramping Clindamycin  02/28/2014    Other (comments) Abdominal pain Ibuprofen  02/16/2010    Other (comments)  
 abd pain and cramping Lisinopril  09/18/2015    Cough Nsaids (Non-steroidal Anti-inflammatory Drug)  02/13/2012    Other (comments) Pain and burning Pcn [Penicillins]  08/18/2009    Rash Tetracycline  08/18/2009    Nausea and Vomiting Current Immunizations  Reviewed on 11/13/2014 Name Date Influenza Vaccine 10/18/2017, 9/19/2014, 9/28/2013 Influenza Vaccine Whole 10/18/2010 Tdap 4/30/2012 Varicella Virus Vaccine 2/28/2013 Not reviewed this visit You Were Diagnosed With   
  
 Codes Comments Essential hypertension, benign    -  Primary ICD-10-CM: I10 
ICD-9-CM: 401.1 Pure hypercholesterolemia     ICD-10-CM: E78.00 ICD-9-CM: 272.0 Current moderate episode of major depressive disorder without prior episode ([address]     ICD-10-CM: F32.1 ICD-9-CM: 296.22 Colon cancer screening     ICD-10-CM: Z12.11 ICD-9-CM: V76.51 Vitals BP Pulse Temp Height(growth percentile) Weight(growth percentile) SpO2  
 125/71 (BP 1 Location: Left arm, BP Patient Position: Sitting) (!) 52 97.7 °F (36.5 °C) (Oral) 5' 3\" (1.6 m) 159 lb (72.1 kg) 97% BMI OB Status Smoking Status 28.17 kg/m2 Hysterectomy Former Smoker BMI and BSA Data Body Mass Index Body Surface Area  
 28.17 kg/m 2 1.79 m 2 Preferred Pharmacy Pharmacy Name Phone [facility]/PHARMACY #[id number] [facility] [phone] Your Updated Medication List  
  
   
This list is accurate as of 6/1/18  8:04 AM.  Always use your most recent med list.  
  
  
  
  
 cetirizine 10 mg tablet Commonly known as:  ZyrTEC Take 1 Tab by mouth daily as needed for Allergies. clonazePAM 0.5 mg tablet Commonly known as:  Calista Cali Take 1 Tab by mouth nightly as needed. escitalopram oxalate 10 mg tablet Commonly known as:  Levorn Allegra Take 1 Tab by mouth daily. estradiol 0.5 mg tablet Commonly known as:  ESTRACE  
TAKE 1 TABLET BY MOUTH EVERY DAY  
  
 levothyroxine 88 mcg tablet Commonly known as:  SYNTHROID Take 1 tab 6 days a week and 1.5 tabs once a week M-VIT PO Take 1 Tab by mouth daily. RESTASIS 0.05 % ophthalmic emulsion Generic drug:  cycloSPORINE Administer 1 Drop to both eyes two (2) times a day. simvastatin 20 mg tablet Commonly known as:  ZOCOR  
TAKE 1 TABLET NIGHTLY  
  
 valsartan 160 mg tablet Commonly known as:  DIOVAN  
 TAKE 1 TABLET DAILY  
  
 zolpidem 10 mg tablet Commonly known as:  AMBIEN Take 1 Tab by mouth nightly as needed for Sleep. Max Daily Amount: 10 mg. We Performed the Following OCCULT BLOOD, IMMUNOASSAY (FIT) [id number] CPT(R)] Follow-up Instructions Return in about 6 months (around 12/1/2018) for htn hld thyroid MDD. Introducing [facility] SERVICES! Dear Jaciel Smalls: 
Thank you for requesting a [employer] account. Our records indicate that you already have an active Wrapp account. You can access your account anytime at [email or web address] Did you know that you can access your hospital and ER discharge instructions at any time in [employer]? You can also review all of your test results from your hospital stay or ER visit. Additional Information If you have questions, please visit the Frequently Asked Questions section of the Wrapp website at [email or web address] Remember, [employer]pp is NOT to be used for urgent needs. For medical emergencies, dial 911. Now available from your iPhone and Android! Please provide this summary of care documentation to your next provider. Your primary care clinician is listed as Rodolfo MOULTON. If you have any questions after today's visit, please call [phone].

## 2018-07-29 RX ORDER — VALSARTAN 160 MG/1
TABLET ORAL
Qty: 90 TAB | Refills: 3 | Status: SHIPPED | OUTPATIENT
Start: 2018-07-29 | End: 2018-08-23

## 2018-07-29 RX ORDER — SIMVASTATIN 20 MG/1
TABLET, FILM COATED ORAL
Qty: 90 TAB | Refills: 3 | Status: SHIPPED | OUTPATIENT
Start: 2018-07-29 | End: 2018-12-05 | Stop reason: SDUPTHER

## 2018-08-08 DIAGNOSIS — F51.01 PRIMARY INSOMNIA: ICD-10-CM

## 2018-08-08 RX ORDER — ZOLPIDEM TARTRATE 10 MG/1
10 TABLET ORAL
Qty: 90 TAB | Refills: 1 | Status: SHIPPED | OUTPATIENT
Start: 2018-08-08 | End: 2019-03-04 | Stop reason: SDUPTHER

## 2018-08-08 NOTE — TELEPHONE ENCOUNTER
From: Miguel Angel Burch  To: Alcon Page MD  Sent: 8/8/2018 10:13 AM EDT  Subject: Medication Renewal Request    Original authorizing provider: MD Miguel Angel Padilla would like a refill of the following medications:  zolpidem (AMBIEN) 10 mg tablet Alcon Page MD]    Preferred pharmacy: [facility]/PHARMACY #[id number] - [address] , [address] [place] [address]:

## 2018-08-09 ENCOUNTER — TELEPHONE (OUTPATIENT)
Dept: INTERNAL MEDICINE CLINIC | Age: 63
End: 2018-08-09

## 2018-08-11 RX ORDER — LEVOTHYROXINE SODIUM 88 UG/1
TABLET ORAL
Qty: 90 TAB | Refills: 3 | Status: SHIPPED | OUTPATIENT
Start: 2018-08-11 | End: 2018-12-07 | Stop reason: SDUPTHER

## 2018-08-15 LAB — HEMOCCULT STL QL IA: NEGATIVE

## 2018-08-21 ENCOUNTER — PATIENT MESSAGE (OUTPATIENT)
Dept: INTERNAL MEDICINE CLINIC | Age: 63
End: 2018-08-21

## 2018-08-21 RX ORDER — LOSARTAN POTASSIUM 50 MG/1
50 TABLET ORAL DAILY
Qty: 90 TAB | Refills: 3 | Status: SHIPPED | OUTPATIENT
Start: 2018-08-21 | End: 2019-02-22

## 2018-08-21 NOTE — TELEPHONE ENCOUNTER
From: Cheko Hu  To: Yulia Lino MD  Sent: 8/21/2018 1:43 AM EDT  Subject: Prescription Question    Good morning Dr. Riley Meek,  Since valsartan has been recalled, I understand that losartan has been substituted for me. Is it possible to send this to my mail order pharmacy so that the price of this medication will be more reasonable than what [employer] wants to charge me? Thanks for your time.   Amna Villeda

## 2018-08-29 DIAGNOSIS — G25.81 RLS (RESTLESS LEGS SYNDROME): ICD-10-CM

## 2018-08-29 RX ORDER — CLONAZEPAM 0.5 MG/1
0.5 TABLET ORAL
Qty: 90 TAB | Refills: 1 | Status: SHIPPED | OUTPATIENT
Start: 2018-08-29 | End: 2019-04-03 | Stop reason: SDUPTHER

## 2018-08-29 NOTE — TELEPHONE ENCOUNTER
From: Nisha Peng  To: Jonh Bazzi MD  Sent: 8/29/2018 6:13 AM EDT  Subject: Medication Renewal Request    Original authorizing provider: MD Nisha Fritz would like a refill of the following medications:  clonazePAM (KLONOPIN) 0.5 mg tablet Jonh Bazzi MD]    Preferred pharmacy: [facility]/PHARMACY #[id number] - [address] [place], [address] [place] [address]:

## 2018-12-04 DIAGNOSIS — R10.32 LLQ PAIN: Primary | ICD-10-CM

## 2018-12-05 ENCOUNTER — HOSPITAL ENCOUNTER (OUTPATIENT)
Dept: CT IMAGING | Age: 63
Discharge: HOME | End: 2018-12-05
Attending: INTERNAL MEDICINE
Payer: COMMERCIAL

## 2018-12-05 ENCOUNTER — OFFICE VISIT (OUTPATIENT)
Dept: INTERNAL MEDICINE CLINIC | Age: 63
End: 2018-12-05

## 2018-12-05 ENCOUNTER — HOSPITAL ENCOUNTER (EMERGENCY)
Age: 63
Discharge: HOME | End: 2018-12-05
Attending: EMERGENCY MEDICINE
Payer: COMMERCIAL

## 2018-12-05 VITALS
WEIGHT: 165 LBS | OXYGEN SATURATION: 96 % | DIASTOLIC BLOOD PRESSURE: 64 MMHG | HEIGHT: 63 IN | SYSTOLIC BLOOD PRESSURE: 109 MMHG | TEMPERATURE: 98 F | BODY MASS INDEX: 29.23 KG/M2 | HEART RATE: 80 BPM

## 2018-12-05 VITALS
WEIGHT: 164.9 LBS | SYSTOLIC BLOOD PRESSURE: 118 MMHG | RESPIRATION RATE: 15 BRPM | TEMPERATURE: 97.8 F | BODY MASS INDEX: 29.22 KG/M2 | DIASTOLIC BLOOD PRESSURE: 48 MMHG | HEART RATE: 66 BPM | OXYGEN SATURATION: 95 % | HEIGHT: 63 IN

## 2018-12-05 DIAGNOSIS — K52.9 COLITIS: Primary | ICD-10-CM

## 2018-12-05 DIAGNOSIS — R10.32 LLQ PAIN: ICD-10-CM

## 2018-12-05 LAB
ALBUMIN SERPL-MCNC: 3.3 G/DL (ref 3.5–5)
ALBUMIN/GLOB SERPL: 0.9 {RATIO} (ref 1.1–2.2)
ALP SERPL-CCNC: 64 U/L (ref 45–117)
ALT SERPL-CCNC: 16 U/L (ref 12–78)
ANION GAP SERPL CALC-SCNC: 9 MMOL/L (ref 5–15)
APPEARANCE UR: CLEAR
AST SERPL-CCNC: 11 U/L (ref 15–37)
BASOPHILS # BLD: 0 K/UL (ref 0–0.1)
BASOPHILS NFR BLD: 0 % (ref 0–1)
BILIRUB SERPL-MCNC: 0.5 MG/DL (ref 0.2–1)
BILIRUB UR QL: NEGATIVE
BUN SERPL-MCNC: 9 MG/DL (ref 6–20)
BUN/CREAT SERPL: 5 (ref 12–20)
CALCIUM SERPL-MCNC: 8.9 MG/DL (ref 8.5–10.1)
CHLORIDE SERPL-SCNC: 107 MMOL/L (ref 97–108)
CO2 SERPL-SCNC: 24 MMOL/L (ref 21–32)
COLOR UR: NORMAL
CREAT BLD-MCNC: 0.9 MG/DL (ref 0.6–1.3)
CREAT SERPL-MCNC: 1.87 MG/DL (ref 0.55–1.02)
DIFFERENTIAL METHOD BLD: ABNORMAL
EOSINOPHIL # BLD: 0.1 K/UL (ref 0–0.4)
EOSINOPHIL NFR BLD: 1 % (ref 0–7)
ERYTHROCYTE [DISTWIDTH] IN BLOOD BY AUTOMATED COUNT: 13.8 % (ref 11.5–14.5)
GLOBULIN SER CALC-MCNC: 3.6 G/DL (ref 2–4)
GLUCOSE SERPL-MCNC: 92 MG/DL (ref 65–100)
GLUCOSE UR STRIP.AUTO-MCNC: NEGATIVE MG/DL
HCT VFR BLD AUTO: 39.7 % (ref 35–47)
HGB BLD-MCNC: 13.2 G/DL (ref 11.5–16)
HGB UR QL STRIP: NEGATIVE
IMM GRANULOCYTES # BLD: 0.1 K/UL (ref 0–0.04)
IMM GRANULOCYTES NFR BLD AUTO: 1 % (ref 0–0.5)
KETONES UR QL STRIP.AUTO: NEGATIVE MG/DL
LACTATE SERPL-SCNC: 0.8 MMOL/L (ref 0.4–2)
LEUKOCYTE ESTERASE UR QL STRIP.AUTO: NEGATIVE
LYMPHOCYTES # BLD: 1.8 K/UL (ref 0.8–3.5)
LYMPHOCYTES NFR BLD: 19 % (ref 12–49)
MCH RBC QN AUTO: 31.1 PG (ref 26–34)
MCHC RBC AUTO-ENTMCNC: 33.2 G/DL (ref 30–36.5)
MCV RBC AUTO: 93.4 FL (ref 80–99)
MONOCYTES # BLD: 0.5 K/UL (ref 0–1)
MONOCYTES NFR BLD: 5 % (ref 5–13)
NEUTS SEG # BLD: 7.2 K/UL (ref 1.8–8)
NEUTS SEG NFR BLD: 74 % (ref 32–75)
NITRITE UR QL STRIP.AUTO: NEGATIVE
NRBC # BLD: 0 K/UL (ref 0–0.01)
NRBC BLD-RTO: 0 PER 100 WBC
PH UR STRIP: 7 [PH] (ref 5–8)
PLATELET # BLD AUTO: 192 K/UL (ref 150–400)
PMV BLD AUTO: 10.5 FL (ref 8.9–12.9)
POTASSIUM SERPL-SCNC: 3.9 MMOL/L (ref 3.5–5.1)
PROT SERPL-MCNC: 6.9 G/DL (ref 6.4–8.2)
PROT UR STRIP-MCNC: NEGATIVE MG/DL
RBC # BLD AUTO: 4.25 M/UL (ref 3.8–5.2)
SODIUM SERPL-SCNC: 140 MMOL/L (ref 136–145)
SP GR UR REFRACTOMETRY: 1.02 (ref 1–1.03)
UROBILINOGEN UR QL STRIP.AUTO: 0.2 EU/DL (ref 0.2–1)
WBC # BLD AUTO: 9.7 K/UL (ref 3.6–11)

## 2018-12-05 PROCEDURE — 96361 HYDRATE IV INFUSION ADD-ON: CPT

## 2018-12-05 PROCEDURE — 96376 TX/PRO/DX INJ SAME DRUG ADON: CPT

## 2018-12-05 PROCEDURE — 83605 ASSAY OF LACTIC ACID: CPT

## 2018-12-05 PROCEDURE — 74011636320 HC RX REV CODE- 636/320: Performed by: INTERNAL MEDICINE

## 2018-12-05 PROCEDURE — 36415 COLL VENOUS BLD VENIPUNCTURE: CPT

## 2018-12-05 PROCEDURE — 85025 COMPLETE CBC W/AUTO DIFF WBC: CPT

## 2018-12-05 PROCEDURE — 74177 CT ABD & PELVIS W/CONTRAST: CPT

## 2018-12-05 PROCEDURE — 96375 TX/PRO/DX INJ NEW DRUG ADDON: CPT

## 2018-12-05 PROCEDURE — 74011250636 HC RX REV CODE- 250/636: Performed by: INTERNAL MEDICINE

## 2018-12-05 PROCEDURE — 74011250636 HC RX REV CODE- 250/636: Performed by: EMERGENCY MEDICINE

## 2018-12-05 PROCEDURE — 72193 CT PELVIS W/DYE: CPT

## 2018-12-05 PROCEDURE — 81003 URINALYSIS AUTO W/O SCOPE: CPT

## 2018-12-05 PROCEDURE — 82565 ASSAY OF CREATININE: CPT

## 2018-12-05 PROCEDURE — 96365 THER/PROPH/DIAG IV INF INIT: CPT

## 2018-12-05 PROCEDURE — 96367 TX/PROPH/DG ADDL SEQ IV INF: CPT

## 2018-12-05 PROCEDURE — 99284 EMERGENCY DEPT VISIT MOD MDM: CPT

## 2018-12-05 PROCEDURE — 74160 CT ABDOMEN W/CONTRAST: CPT

## 2018-12-05 PROCEDURE — 80053 COMPREHEN METABOLIC PANEL: CPT

## 2018-12-05 RX ORDER — METRONIDAZOLE 500 MG/1
500 TABLET ORAL 3 TIMES DAILY
Qty: 21 TAB | Refills: 0 | Status: SHIPPED | OUTPATIENT
Start: 2018-12-05 | End: 2018-12-12

## 2018-12-05 RX ORDER — MORPHINE SULFATE 2 MG/ML
2 INJECTION, SOLUTION INTRAMUSCULAR; INTRAVENOUS
Status: COMPLETED | OUTPATIENT
Start: 2018-12-05 | End: 2018-12-05

## 2018-12-05 RX ORDER — CIPROFLOXACIN 2 MG/ML
400 INJECTION, SOLUTION INTRAVENOUS
Status: COMPLETED | OUTPATIENT
Start: 2018-12-05 | End: 2018-12-05

## 2018-12-05 RX ORDER — METRONIDAZOLE 500 MG/100ML
500 INJECTION, SOLUTION INTRAVENOUS
Status: COMPLETED | OUTPATIENT
Start: 2018-12-05 | End: 2018-12-05

## 2018-12-05 RX ORDER — SODIUM CHLORIDE 0.9 % (FLUSH) 0.9 %
10 SYRINGE (ML) INJECTION
Status: COMPLETED | OUTPATIENT
Start: 2018-12-05 | End: 2018-12-05

## 2018-12-05 RX ORDER — SODIUM CHLORIDE 9 MG/ML
50 INJECTION, SOLUTION INTRAVENOUS
Status: COMPLETED | OUTPATIENT
Start: 2018-12-05 | End: 2018-12-05

## 2018-12-05 RX ORDER — CIPROFLOXACIN 500 MG/1
500 TABLET ORAL 2 TIMES DAILY
Qty: 14 TAB | Refills: 0 | Status: SHIPPED | OUTPATIENT
Start: 2018-12-05 | End: 2018-12-12

## 2018-12-05 RX ORDER — ONDANSETRON 2 MG/ML
4 INJECTION INTRAMUSCULAR; INTRAVENOUS
Status: COMPLETED | OUTPATIENT
Start: 2018-12-05 | End: 2018-12-05

## 2018-12-05 RX ADMIN — SODIUM CHLORIDE 1000 ML: 900 INJECTION, SOLUTION INTRAVENOUS at 15:26

## 2018-12-05 RX ADMIN — CIPROFLOXACIN 400 MG: 2 INJECTION, SOLUTION INTRAVENOUS at 18:14

## 2018-12-05 RX ADMIN — SODIUM CHLORIDE 1000 ML: 900 INJECTION, SOLUTION INTRAVENOUS at 17:01

## 2018-12-05 RX ADMIN — MORPHINE SULFATE 2 MG: 2 INJECTION, SOLUTION INTRAMUSCULAR; INTRAVENOUS at 17:01

## 2018-12-05 RX ADMIN — SODIUM CHLORIDE 50 ML/HR: 900 INJECTION, SOLUTION INTRAVENOUS at 08:29

## 2018-12-05 RX ADMIN — IOPAMIDOL 100 ML: 755 INJECTION, SOLUTION INTRAVENOUS at 08:29

## 2018-12-05 RX ADMIN — IOHEXOL 20 ML: 240 INJECTION, SOLUTION INTRATHECAL; INTRAVASCULAR; INTRAVENOUS; ORAL at 08:29

## 2018-12-05 RX ADMIN — METRONIDAZOLE 500 MG: 500 INJECTION, SOLUTION INTRAVENOUS at 17:01

## 2018-12-05 RX ADMIN — Medication 10 ML: at 08:29

## 2018-12-05 RX ADMIN — MORPHINE SULFATE 2 MG: 2 INJECTION, SOLUTION INTRAMUSCULAR; INTRAVENOUS at 15:48

## 2018-12-05 RX ADMIN — ONDANSETRON 4 MG: 2 INJECTION INTRAMUSCULAR; INTRAVENOUS at 15:48

## 2018-12-05 NOTE — PROGRESS NOTES
HISTORY OF PRESENT ILLNESS Tamera Mckoy is a 66 y.o. female. HPI  
LLQ pain 7/10 sine yesterday 15-20 bm's since yesterday-some with blood and some clots No f/c Last colonoscopy age 50 was wnl per pt-Dr Polly Ellis Patient Active Problem List  
 Diagnosis Date Noted  Dry eyes 09/18/2015  Hypothyroidism due to acquired atrophy of thyroid 12/31/2012  Essential hypertension, benign 08/18/2009  Pure hypercholesterolemia 08/18/2009  RLS (restless legs syndrome) 08/18/2009 Current Outpatient Medications Medication Sig Dispense Refill  clonazePAM (KLONOPIN) 0.5 mg tablet Take 1 Tab by mouth nightly as needed. 90 Tab 1  
 losartan (COZAAR) 50 mg tablet Take 1 Tab by mouth daily. 90 Tab 3  
 levothyroxine (SYNTHROID) 88 mcg tablet TAKE 1 TAB BY MOUTH DAILY (BEFORE BREAKFAST). TAKE 1/2 TABLET ON SATURDAY. 90 Tab 3  
 levothyroxine (SYNTHROID) 88 mcg tablet Take 1 tab 6 days a week and 1.5 tabs once a week 96 Tab 0  
 zolpidem (AMBIEN) 10 mg tablet Take 1 Tab by mouth nightly as needed for Sleep. Max Daily Amount: 10 mg. 90 Tab 1  
 escitalopram oxalate (LEXAPRO) 10 mg tablet Take 1 Tab by mouth daily. 90 Tab 3  
 estradiol (ESTRACE) 0.5 mg tablet TAKE 1 TABLET BY MOUTH EVERY DAY  2  
 simvastatin (ZOCOR) 20 mg tablet TAKE 1 TABLET NIGHTLY 90 Tab 3  cetirizine (ZYRTEC) 10 mg tablet Take 1 Tab by mouth daily as needed for Allergies. 90 Tab 3  cycloSPORINE (RESTASIS) 0.05 % ophthalmic emulsion Administer 1 Drop to both eyes two (2) times a day.  PV W-O OXANA/FERROUS FUMARATE/FA (M-VIT PO) Take 1 Tab by mouth daily. Allergies Allergen Reactions  Bactrim [Sulfamethoprim] Nausea and Vomiting  Omnicef [Cefdinir] Nausea and Vomiting  Aspirin Other (comments)  
  abd pain and cramping  Clindamycin Other (comments) Abdominal pain  Ibuprofen Other (comments)  
  abd pain and cramping  Lisinopril Cough  Nsaids (Non-Steroidal Anti-Inflammatory Drug) Other (comments) Pain and burning  Pcn [Penicillins] Rash  Tetracycline Nausea and Vomiting ROS Physical Exam  
Constitutional: She appears well-developed and well-nourished. Appears stated age Cardiovascular: Normal rate, regular rhythm and normal heart sounds. Exam reveals no gallop and no friction rub. No murmur heard. Pulmonary/Chest: Effort normal and breath sounds normal. No respiratory distress. She has no wheezes. Abdominal: Soft. Bowel sounds are normal. There is tenderness. TTP LLQ to light palpation Musculoskeletal: She exhibits no edema. Neurological: She is alert. Psychiatric: She has a normal mood and affect. Nursing note and vitals reviewed. ASSESSMENT and PLAN Diagnoses and all orders for this visit: 1. Colitis Sigmoid colitis by CT 
 ? Ischemic colitis? Sent pt to ED today at [facility] D/w ER attending Follow-up Disposition: Not on File

## 2018-12-05 NOTE — LETTER
[facility] EMERGENCY DEPT 
[address] [place] [address] [address] [id number] [phone] Work/School Note Date: 12/5/2018 To Whom It May concern: 
 
Anam Salgado was seen and treated today in the emergency room by the following provider(s): 
Attending Provider: Jose Francisco Cardenas MD.   
 
Anam Salgado may return to work on 12/8. Sincerely, 
 
 
 
 
Anne Marie Downs MD

## 2018-12-05 NOTE — ED PROVIDER NOTES
EMERGENCY DEPARTMENT HISTORY AND PHYSICAL EXAM 
 
 
Date: 12/5/2018 Patient Name: Tamera Mckoy History of Presenting Illness Chief Complaint Patient presents with  Abdominal Pain LLQ pain, colitis on outpatient CT; sent by Dr. Christopher Horn. PROVIDER IN TRIAGE NOTE: 
12:57 PM 
Dino Lawrence PA-C has evaluated the patient as the Provider in Triage (PIT) for LLQ pain and colitis on CT. The vital signs and the triage nurse assessment have been reviewed. The patient and any available family have been advised that the appropriate studies have been ordered to initiate the work up based on the clinical presentation during the assessment. The pt has been advised that they will be accommodated in Emergency Department as soon as possible. The pt has been requested to contact the triage nurse or PIT immediately if they experiences any changes in their condition during this brief waiting period. History Provided By: Patient HPI: Tamera Mckoy, 66 y.o. female with PMHx significant for HTN, RLS, hypothyroidism, H. Pylori ab+, presents ambulatory to the ED with cc of LLQ pain with associated bloody diarrhea onset 2 days. Pt reports that she had a CT performed by her PCP this AM, and showing colitis; PCP advised pt to go to ED for further evaluation due to concerns for possible ischemic colitis. She reports of bloody diarrhea with mucous. Her last BM was 1130 today, and she reports her stools had streaks of blood in it. She denies hx of arrhythmia. She denies fever, CP, SOB, dysuria, or frequency. She denies taking any pain meds. No recent abx There are no other complaints, changes, or physical findings at this time. PCP: Raul Cardenas MD 
 
Current Outpatient Medications Medication Sig Dispense Refill  ciprofloxacin HCl (CIPRO) 500 mg tablet Take 1 Tab by mouth two (2) times a day for 7 days.  14 Tab 0  
 metroNIDAZOLE (FLAGYL) 500 mg tablet Take 1 Tab by mouth three (3) times daily for 7 days. 21 Tab 0  clonazePAM (KLONOPIN) 0.5 mg tablet Take 1 Tab by mouth nightly as needed. 90 Tab 1  
 losartan (COZAAR) 50 mg tablet Take 1 Tab by mouth daily. 90 Tab 3  
 levothyroxine (SYNTHROID) 88 mcg tablet TAKE 1 TAB BY MOUTH DAILY (BEFORE BREAKFAST). TAKE 1/2 TABLET ON SATURDAY. 90 Tab 3  
 levothyroxine (SYNTHROID) 88 mcg tablet Take 1 tab 6 days a week and 1.5 tabs once a week 96 Tab 0  
 zolpidem (AMBIEN) 10 mg tablet Take 1 Tab by mouth nightly as needed for Sleep. Max Daily Amount: 10 mg. 90 Tab 1  
 escitalopram oxalate (LEXAPRO) 10 mg tablet Take 1 Tab by mouth daily. 90 Tab 3  
 estradiol (ESTRACE) 0.5 mg tablet TAKE 1 TABLET BY MOUTH EVERY DAY  2  
 simvastatin (ZOCOR) 20 mg tablet TAKE 1 TABLET NIGHTLY 90 Tab 3  cetirizine (ZYRTEC) 10 mg tablet Take 1 Tab by mouth daily as needed for Allergies. 90 Tab 3  cycloSPORINE (RESTASIS) 0.05 % ophthalmic emulsion Administer 1 Drop to both eyes two (2) times a day.  PV W-O OXANA/FERROUS FUMARATE/FA (M-VIT PO) Take 1 Tab by mouth daily. Past History Past Medical History: 
Past Medical History:  
Diagnosis Date  Helicobacter pylori ab+   
 treated  Hypercholesteremia  Hypertension  Obesity surgery status 2009  
 s/p lap band Dr. Radha Huston  RLS (restless legs syndrome)  Unspecified hypothyroidism 12/31/2012 Past Surgical History: 
Past Surgical History:  
Procedure Laterality Date  HX CARPAL TUNNEL RELEASE    
 b/l  HX CATARACT REMOVAL Bilateral June and July 2014  HX CERVICAL DISKECTOMY    
 c5/c6 2006  HX GYN    
 tubal ligation Ketur[facility] Estes G[facility]  Jan 2014  
 pelvic organ prolapse surgery  HX HEENT    
 tonsillectomy  HX ORTHOPAEDIC    
 x2 trigger fingers right hand  HX OTHER SURGICAL  4/2008  
 lap band  HX PARTIAL HYSTERECTOMY  LAP, CHANGE ADJUST KAYLEE BAND Family History: 
Family History Problem Relation Age of Onset  Thyroid Disease Mother hashimoto's  Thyroid Disease Daughter   
     hashimoto's  Diabetes Father   
     type 2  
 Heart Disease Father 64  
     multiple MIs Social History: 
Social History Tobacco Use  Smoking status: Former Smoker Packs/day: 1.00 Years: 2.00 Pack years: 2.00 Last attempt to quit: 1978 Years since quittin.9  Smokeless tobacco: Never Used Substance Use Topics  Alcohol use: Yes Frequency: 2-3 times a week Drinks per session: 1 or 2 Comment: 2 drinks weekly  Drug use: Yes Types: Prescription, OTC Allergies: Allergies Allergen Reactions  Bactrim [Sulfamethoprim] Nausea and Vomiting  Omnicef [Cefdinir] Nausea and Vomiting  Aspirin Other (comments)  
  abd pain and cramping  Clindamycin Other (comments) Abdominal pain  Ibuprofen Other (comments)  
  abd pain and cramping  Lisinopril Cough  Nsaids (Non-Steroidal Anti-Inflammatory Drug) Other (comments) Pain and burning  Pcn [Penicillins] Rash  Tetracycline Nausea and Vomiting Review of Systems Review of Systems Constitutional: Negative for chills and fever. HENT: Negative for congestion, rhinorrhea and sore throat. Respiratory: Negative for cough and shortness of breath. Cardiovascular: Negative for chest pain. Gastrointestinal: Positive for abdominal pain (LLQ), blood in stool and diarrhea. Negative for nausea and vomiting. Genitourinary: Negative for dysuria and urgency. Skin: Negative for rash. Neurological: Negative for dizziness, light-headedness and headaches. All other systems reviewed and are negative. Physical Exam  
Physical Exam  
Constitutional: She is oriented to person, place, and time. She appears well-developed and well-nourished. No distress. HENT:  
Head: Normocephalic and atraumatic. Eyes: Conjunctivae and EOM are normal. Pupils are equal, round, and reactive to light. Neck: Normal range of motion. Cardiovascular: Normal rate, regular rhythm and intact distal pulses. Pulmonary/Chest: Effort normal and breath sounds normal. No stridor. No respiratory distress. Abdominal: Soft. She exhibits no distension. There is tenderness in the left lower quadrant. There is no rebound and no guarding. Musculoskeletal: Normal range of motion. Neurological: She is alert and oriented to person, place, and time. Skin: Skin is warm and dry. Psychiatric: She has a normal mood and affect. Nursing note and vitals reviewed. Diagnostic Study Results Labs - Recent Results (from the past 12 hour(s)) URINALYSIS W/ RFLX MICROSCOPIC Collection Time: 12/05/18 12:48 PM  
Result Value Ref Range Color YELLOW/STRAW Appearance CLEAR CLEAR Specific gravity 1.023 1.003 - 1.030    
 pH (UA) 7.0 5.0 - 8.0 Protein NEGATIVE  NEG mg/dL Glucose NEGATIVE  NEG mg/dL Ketone NEGATIVE  NEG mg/dL Bilirubin NEGATIVE  NEG Blood NEGATIVE  NEG Urobilinogen 0.2 0.2 - 1.0 EU/dL Nitrites NEGATIVE  NEG Leukocyte Esterase NEGATIVE  NEG    
CBC WITH AUTOMATED DIFF Collection Time: 12/05/18  1:03 PM  
Result Value Ref Range WBC 9.7 3.6 - 11.0 K/uL  
 RBC 4.25 3.80 - 5.20 M/uL  
 HGB 13.2 11.5 - 16.0 g/dL HCT 39.7 35.0 - 47.0 % MCV 93.4 80.0 - 99.0 FL  
 MCH 31.1 26.0 - 34.0 PG  
 MCHC 33.2 30.0 - 36.5 g/dL  
 RDW 13.8 11.5 - 14.5 % PLATELET 174 564 - 957 K/uL MPV 10.5 8.9 - 12.9 FL  
 NRBC 0.0 0  WBC ABSOLUTE NRBC 0.00 0.00 - 0.01 K/uL NEUTROPHILS 74 32 - 75 % LYMPHOCYTES 19 12 - 49 % MONOCYTES 5 5 - 13 % EOSINOPHILS 1 0 - 7 % BASOPHILS 0 0 - 1 % IMMATURE GRANULOCYTES 1 (H) 0.0 - 0.5 % ABS. NEUTROPHILS 7.2 1.8 - 8.0 K/UL  
 ABS. LYMPHOCYTES 1.8 0.8 - 3.5 K/UL  
 ABS. MONOCYTES 0.5 0.0 - 1.0 K/UL  
 ABS. EOSINOPHILS 0.1 0.0 - 0.4 K/UL  
 ABS. BASOPHILS 0.0 0.0 - 0.1 K/UL ABS. IMM. GRANS. 0.1 (H) 0.00 - 0.04 K/UL  
 DF AUTOMATED METABOLIC PANEL, COMPREHENSIVE Collection Time: 12/05/18  1:03 PM  
Result Value Ref Range Sodium 140 136 - 145 mmol/L Potassium 3.9 3.5 - 5.1 mmol/L Chloride 107 97 - 108 mmol/L  
 CO2 24 21 - 32 mmol/L Anion gap 9 5 - 15 mmol/L Glucose 92 65 - 100 mg/dL BUN 9 6 - 20 MG/DL Creatinine 1.87 (H) 0.55 - 1.02 MG/DL  
 BUN/Creatinine ratio 5 (L) 12 - 20 GFR est AA 33 (L) >60 ml/min/1.73m2 GFR est non-AA 27 (L) >60 ml/min/1.73m2 Calcium 8.9 8.5 - 10.1 MG/DL Bilirubin, total 0.5 0.2 - 1.0 MG/DL  
 ALT (SGPT) 16 12 - 78 U/L  
 AST (SGOT) 11 (L) 15 - 37 U/L Alk. phosphatase 64 45 - 117 U/L Protein, total 6.9 6.4 - 8.2 g/dL Albumin 3.3 (L) 3.5 - 5.0 g/dL Globulin 3.6 2.0 - 4.0 g/dL A-G Ratio 0.9 (L) 1.1 - 2.2 LACTIC ACID Collection Time: 12/05/18  3:04 PM  
Result Value Ref Range Lactic acid 0.8 0.4 - 2.0 MMOL/L  
POC CREATININE Collection Time: 12/05/18  6:57 PM  
Result Value Ref Range Creatinine (POC) 0.9 0.6 - 1.3 mg/dL GFRAA, POC >60 >60 ml/min/1.73m2 GFRNA, POC >60 >60 ml/min/1.73m2 Radiologic Studies - No orders to display CT Results  (Last 48 hours) 12/05/18 0658  CT ABD PELV W CONT Final result Impression:  IMPRESSION:  
   
Status post gastric band surgery. Somewhat patulous contrasting gas filled distal esophagus. Suspect diffuse wall thickening involving sigmoid colon with possible slight  
relative sparing of the rectum. Surrounding pericolonic inflammatory change  
suggest colitis of uncertain etiology. Narrative:  EXAM:  CT ABD PELV W CONT INDICATION: LLQ pain diarrhea bloody stools COMPARISON: November 9, 2017. CONTRAST:  100 mL of Isovue-370.   
   
TECHNIQUE:   
Following the uneventful intravenous administration of contrast, thin axial  
 images were obtained through the abdomen and pelvis. Coronal and sagittal  
reconstructions were generated. Oral contrast was administered. CT dose  
reduction was achieved through use of a standardized protocol tailored for this  
examination and automatic exposure control for dose modulation. FINDINGS:   
LUNG BASES: Clear. Dilated esophagus with contrast and air. INCIDENTALLY IMAGED HEART AND MEDIASTINUM: Unremarkable. LIVER: No mass or biliary dilatation. GALLBLADDER: Unremarkable. SPLEEN: No mass. PANCREAS: No mass or ductal dilatation. ADRENALS: Unremarkable. KIDNEYS: No mass, calculus, or hydronephrosis. STOMACH: LAP-BAND in place. SMALL BOWEL: No dilatation or wall thickening. COLON: There appears to be generalized wall thickening involving the sigmoid  
colon with pericolonic inflammation. APPENDIX: Unremarkable. PERITONEUM: No ascites or pneumoperitoneum. RETROPERITONEUM: No lymphadenopathy or aortic aneurysm. REPRODUCTIVE ORGANS: Hysterectomy. URINARY BLADDER: No mass or calculus. BONES: No destructive bone lesion. ADDITIONAL COMMENTS: N/A Medical Decision Making I am the first provider for this patient. I reviewed the vital signs, available nursing notes, past medical history, past surgical history, family history and social history. Vital Signs-Reviewed the patient's vital signs. Patient Vitals for the past 12 hrs: 
 Temp Pulse Resp BP SpO2  
12/05/18 1845  66  117/73 95 % 12/05/18 1813  70  98/63 95 % 12/05/18 1630  67  123/66 96 % 12/05/18 1545  67 15 101/50 96 % 12/05/18 1242 97.8 °F (36.6 °C) 83 16 122/61 97 % Pulse Oximetry Analysis - 97% on RA Records Reviewed: Nursing Notes, Old Medical Records, Previous Radiology Studies and Previous Laboratory Studies Provider Notes (Medical Decision Making): On exam, patient with pain in the LLQ, consistent with dx of colitis.  Her pain is not out of preporation, she is afebrile, and otherwise will appearing. Will check basic labs, lactate, send stool studies is pt has bowel movement in the ED. I have low suspicion for ischemic colitis based on her presentation, but will get lactate to further r/o  
 
ED Course:  
Initial assessment performed. The patients presenting problems have been discussed, and they are in agreement with the care plan formulated and outlined with them. I have encouraged them to ask questions as they arise throughout their visit. Labs with an BENTLEY which corrected with 2L IVF. Lactate normal. Pt has not had any BMs in the ED, and feels as though the frequency of BMs is decreasing. Will tx with abx. Return precautions were discussed Critical Care Time:  
0 minutes. Disposition: 
Discharge Note: 
8:04 PM 
The patient has been re-evaluated and is ready for discharge. Reviewed available results with patient. Counseled patient/parent/guardian on diagnosis and care plan. Patient has expressed understanding, and all questions have been answered. Patient agrees with plan and agrees to follow up as recommended, or return to the ED if their symptoms worsen. Discharge instructions have been provided and explained to the patient, along with reasons to return to the ED. PLAN: 
1. Current Discharge Medication List  
  
START taking these medications Details  
ciprofloxacin HCl (CIPRO) 500 mg tablet Take 1 Tab by mouth two (2) times a day for 7 days. Qty: 14 Tab, Refills: 0  
  
metroNIDAZOLE (FLAGYL) 500 mg tablet Take 1 Tab by mouth three (3) times daily for 7 days. Qty: 21 Tab, Refills: 0  
  
  
 
2. Follow-up Information Follow up With Specialties Details Why Contact Info Andre Stevens MD Internal Medicine Schedule an appointment as soon as possible for a visit  [address] [facility] [address] [facility] [address]. 
[phone] [facility] EMERGENCY DEPT Emergency Medicine  As needed, If symptoms worsen [address] [facility] 
[phone] Return to ED if worse Diagnosis Clinical Impression: 1. Colitis Attestations: This note is prepared by Eloy Jimenez, acting as scribe for MD Lida Avendaño MD: The scribe's documentation has been prepared under my direction and personally reviewed by me in its entirety. I confirm that the note above accurately reflects all work, treatment, procedures, and medical decision making performed by me.

## 2018-12-06 NOTE — PROGRESS NOTES
HISTORY OF PRESENT ILLNESS Evonne Benavidez is a 66 y.o. female. HPI Here in ED f/u sigmoid colitis Was hydrated 2l IVF, feels better overall now Creatinine up to 1.87 down to 0.9 after IVF Lactate wnl Sent home with oral abx--cipro and flagyl. Stool cx was ordered Normal cbc LLQ Pain 2/10 from 7/10 No diarrhea or bloody stools since ED visit Some epigastric discomfort since starting abx Patient Active Problem List  
 Diagnosis Date Noted  Dry eyes 09/18/2015  Hypothyroidism due to acquired atrophy of thyroid 12/31/2012  Essential hypertension, benign 08/18/2009  Pure hypercholesterolemia 08/18/2009  RLS (restless legs syndrome) 08/18/2009 Current Outpatient Medications Medication Sig Dispense Refill  ciprofloxacin HCl (CIPRO) 500 mg tablet Take 1 Tab by mouth two (2) times a day for 7 days. 14 Tab 0  
 metroNIDAZOLE (FLAGYL) 500 mg tablet Take 1 Tab by mouth three (3) times daily for 7 days. 21 Tab 0  clonazePAM (KLONOPIN) 0.5 mg tablet Take 1 Tab by mouth nightly as needed. 90 Tab 1  
 losartan (COZAAR) 50 mg tablet Take 1 Tab by mouth daily. 90 Tab 3  
 levothyroxine (SYNTHROID) 88 mcg tablet TAKE 1 TAB BY MOUTH DAILY (BEFORE BREAKFAST). TAKE 1/2 TABLET ON SATURDAY. 90 Tab 3  
 levothyroxine (SYNTHROID) 88 mcg tablet Take 1 tab 6 days a week and 1.5 tabs once a week 96 Tab 0  
 zolpidem (AMBIEN) 10 mg tablet Take 1 Tab by mouth nightly as needed for Sleep. Max Daily Amount: 10 mg. 90 Tab 1  
 escitalopram oxalate (LEXAPRO) 10 mg tablet Take 1 Tab by mouth daily. 90 Tab 3  
 estradiol (ESTRACE) 0.5 mg tablet TAKE 1 TABLET BY MOUTH EVERY DAY  2  
 simvastatin (ZOCOR) 20 mg tablet TAKE 1 TABLET NIGHTLY 90 Tab 3  cetirizine (ZYRTEC) 10 mg tablet Take 1 Tab by mouth daily as needed for Allergies. 90 Tab 3  cycloSPORINE (RESTASIS) 0.05 % ophthalmic emulsion Administer 1 Drop to both eyes two (2) times a day.  PV W-O OXANA/FERROUS FUMARATE/FA (M-VIT PO) Take 1 Tab by mouth daily. Lab Results Component Value Date/Time WBC 9.7 12/05/2018 01:03 PM  
 HGB 13.2 12/05/2018 01:03 PM  
 HCT 39.7 12/05/2018 01:03 PM  
 PLATELET 725 08/87/8075 01:03 PM  
 MCV 93.4 12/05/2018 01:03 PM  
 
Lab Results Component Value Date/Time GFR est non-AA 27 (L) 12/05/2018 01:03 PM  
 GFRNA, POC >60 12/05/2018 06:57 PM  
 GFR est AA 33 (L) 12/05/2018 01:03 PM  
 GFRAA, POC >60 12/05/2018 06:57 PM  
 Creatinine 1.87 (H) 12/05/2018 01:03 PM  
 Creatinine (POC) 0.9 12/05/2018 06:57 PM  
 BUN 9 12/05/2018 01:03 PM  
 Sodium 140 12/05/2018 01:03 PM  
 Potassium 3.9 12/05/2018 01:03 PM  
 Chloride 107 12/05/2018 01:03 PM  
 CO2 24 12/05/2018 01:03 PM  
  
ROS Physical Exam  
Constitutional: She appears well-developed and well-nourished. Appears stated age Cardiovascular: Normal rate, regular rhythm and normal heart sounds. Exam reveals no gallop and no friction rub. No murmur heard. Pulmonary/Chest: Effort normal and breath sounds normal. No respiratory distress. She has no wheezes. Abdominal: Soft. Bowel sounds are normal. She exhibits no distension and no mass. There is no rebound and no guarding. Very mild TTP LLQ Musculoskeletal: She exhibits no edema. Neurological: She is alert. Psychiatric: She has a normal mood and affect. Nursing note and vitals reviewed. ASSESSMENT and PLAN Diagnoses and all orders for this visit: 1. Colitis, acute -     C DIFFICILE TOXIN GENE, SABA 
-     CULTURE, STOOL 
-     REFERRAL TO GASTROENTEROLOGY-colonoscopy Improving Will send stool sample if has any recurrent diarrhea today Hydration Bentyl rx 2. LLQ pain 
-     REFERRAL TO GASTROENTEROLOGY Improving Complete abx, bentyl, bowel rest 
3. Epigastric discomfort 
 otc PPI Other orders 
-     dicyclomine (BENTYL) 10 mg capsule; Take 1 Cap by mouth four (4) times daily.

## 2018-12-06 NOTE — ED NOTES
Verbal shift change report given to Jonelle Apple, RN (oncoming nurse) by this RN (offgoing nurse). Report included the following information SBAR.

## 2018-12-06 NOTE — ED NOTES
MD Cooper reviewed discharge instructions with the patient. The patient verbalized understanding. All questions and concerns were addressed. The patient declined a wheelchair and is discharged  with instructions and prescriptions in hand. Pt is alert and oriented x 4. Respirations are clear and unlabored.

## 2018-12-06 NOTE — DISCHARGE INSTRUCTIONS
Colitis: Care Instructions  Your Care Instructions  Colitis is the medical term for swelling (inflammation) of the intestine. It can be caused by different things, such as an infection or loss of blood flow in the intestine. Other causes are problems like Crohn's disease or ulcerative colitis. Symptoms may include fever, diarrhea that may be bloody, or belly pain. Sometimes symptoms go away without treatment. But you may need treatment or more tests, such as blood tests or a stool test. Or you may need imaging tests like a CT scan or a colonoscopy. In some cases, the doctor may want to test a sample of tissue from the intestine. This test is called a biopsy. The doctor has checked you carefully, but problems can develop later. If you notice any problems or new symptoms, get medical treatment right away. Follow-up care is a key part of your treatment and safety. Be sure to make and go to all appointments, and call your doctor if you are having problems. It's also a good idea to know your test results and keep a list of the medicines you take. How can you care for yourself at home? · Rest until you feel better. · Your doctor may recommend that you eat bland foods. These include rice, dry toast or crackers, bananas, and applesauce. · To prevent dehydration, drink plenty of fluids. Choose water and other caffeine-free clear liquids until you feel better. If you have kidney, heart, or liver disease and have to limit fluids, talk with your doctor before you increase the amount of fluids you drink. · Be safe with medicines. Take your medicines exactly as prescribed. Call your doctor if you think you are having a problem with your medicine. You will get more details on the specific medicines your doctor prescribes. When should you call for help? Call 911 anytime you think you may need emergency care. For example, call if:    · You passed out (lost consciousness).     · Your stools are maroon or very bloody.  Call your doctor now or seek immediate medical care if:    · You have new or worse belly pain.     · You have a fever.     · You are vomiting.     · You cannot pass stools or gas.     · You have new or more blood in your stools.    Watch closely for changes in your health, and be sure to contact your doctor if:    · You have new or worse symptoms.     · You are losing weight.     · You do not get better as expected. Where can you learn more? Go to http://mona-alla.info/. Michael Jiménez in the search box to learn more about \"Colitis: Care Instructions. \"  Current as of: March 28, 2018  Content Version: 11.8  © 8877-6385 [employer]. Care instructions adapted under license by [employer] (which disclaims liability or warranty for this information). If you have questions about a medical condition or this instruction, always ask your healthcare professional. [address] any warranty or liability for your use of this information.

## 2018-12-07 ENCOUNTER — OFFICE VISIT (OUTPATIENT)
Dept: INTERNAL MEDICINE CLINIC | Age: 63
End: 2018-12-07

## 2018-12-07 ENCOUNTER — PATIENT OUTREACH (OUTPATIENT)
Dept: OTHER | Age: 63
End: 2018-12-07

## 2018-12-07 VITALS
TEMPERATURE: 97.6 F | HEIGHT: 63 IN | OXYGEN SATURATION: 97 % | HEART RATE: 61 BPM | BODY MASS INDEX: 29.23 KG/M2 | SYSTOLIC BLOOD PRESSURE: 132 MMHG | WEIGHT: 165 LBS | DIASTOLIC BLOOD PRESSURE: 80 MMHG

## 2018-12-07 DIAGNOSIS — K52.9 COLITIS, ACUTE: Primary | ICD-10-CM

## 2018-12-07 DIAGNOSIS — R10.32 LLQ PAIN: ICD-10-CM

## 2018-12-07 RX ORDER — DICYCLOMINE HYDROCHLORIDE 10 MG/1
10 CAPSULE ORAL 4 TIMES DAILY
Qty: 40 CAP | Refills: 0 | Status: SHIPPED | OUTPATIENT
Start: 2018-12-07 | End: 2019-02-22

## 2018-12-07 NOTE — PROGRESS NOTES
Patient on report as eligible for Case Management. Left discreet message on voicemail with this CM contact information. Will attempt to contact again to offer [phone] Management services.

## 2018-12-10 ENCOUNTER — PATIENT OUTREACH (OUTPATIENT)
Dept: OTHER | Age: 63
End: 2018-12-10

## 2018-12-14 ENCOUNTER — HOSPITAL ENCOUNTER (OUTPATIENT)
Dept: MAMMOGRAPHY | Age: 63
Discharge: HOME | End: 2018-12-14
Payer: COMMERCIAL

## 2018-12-14 DIAGNOSIS — Z12.39 SCREENING BREAST EXAMINATION: ICD-10-CM

## 2018-12-14 PROCEDURE — 77067 SCR MAMMO BI INCL CAD: CPT

## 2019-01-04 ENCOUNTER — PATIENT OUTREACH (OUTPATIENT)
Dept: OTHER | Age: 64
End: 2019-01-04

## 2019-01-04 NOTE — PROGRESS NOTES
Attempt to reach patient for follow up. Voice mailbox was full and unable to accept messages. Lost to follow up letter has been sent. Will await call back.

## 2019-01-11 ENCOUNTER — DOCUMENTATION ONLY (OUTPATIENT)
Dept: OTHER | Age: 64
End: 2019-01-11

## 2019-01-30 DIAGNOSIS — E03.4 HYPOTHYROIDISM DUE TO ACQUIRED ATROPHY OF THYROID: ICD-10-CM

## 2019-01-31 ENCOUNTER — TELEPHONE (OUTPATIENT)
Dept: ENDOCRINOLOGY | Age: 64
End: 2019-01-31

## 2019-01-31 LAB
SPECIMEN STATUS REPORT, ROLRST: NORMAL
T4 FREE SERPL-MCNC: 1.2 NG/DL (ref 0.82–1.77)

## 2019-01-31 NOTE — TELEPHONE ENCOUNTER
Can you call [facility] and see if they berhane BOTH a TSH and free T4 from her specimen yesterday as I only received the free T4 result and both were ordered? If they didn't, please have them add on a TSH. Thanks.

## 2019-02-02 LAB
SPECIMEN STATUS REPORT, ROLRST: NORMAL
TSH SERPL DL<=0.005 MIU/L-ACNC: 0.77 UIU/ML (ref 0.45–4.5)

## 2019-02-22 ENCOUNTER — OFFICE VISIT (OUTPATIENT)
Dept: ENDOCRINOLOGY | Age: 64
End: 2019-02-22

## 2019-02-22 VITALS
BODY MASS INDEX: 28.56 KG/M2 | HEIGHT: 63 IN | SYSTOLIC BLOOD PRESSURE: 118 MMHG | WEIGHT: 161.2 LBS | DIASTOLIC BLOOD PRESSURE: 82 MMHG | HEART RATE: 59 BPM

## 2019-02-22 DIAGNOSIS — E03.4 HYPOTHYROIDISM DUE TO ACQUIRED ATROPHY OF THYROID: Primary | ICD-10-CM

## 2019-02-22 RX ORDER — LEVOTHYROXINE SODIUM 88 UG/1
TABLET ORAL
Qty: 96 TAB | Refills: 3 | Status: SHIPPED | OUTPATIENT
Start: 2019-02-22 | End: 2019-04-24 | Stop reason: SDUPTHER

## 2019-02-22 RX ORDER — VALSARTAN 160 MG/1
TABLET ORAL DAILY
COMMUNITY
End: 2019-10-25 | Stop reason: ALTCHOICE

## 2019-02-22 NOTE — PATIENT INSTRUCTIONS
1) TSH is a thyroid test.  Your level is 0.77 which is normal and at goal of 0.5-2.0. This test goes opposite of your thyroid dose and suggests your dose of levothyroxine is perfect so I will keep your dose the same. 2) I will plan on seeing you back in one year but if you feel you need to have your labs checked sooner, please let me know.

## 2019-02-22 NOTE — PROGRESS NOTES
Chief Complaint   Patient presents with    Thyroid Problem     pcp and pharmacy confirmed     History of Present Illness: Zenia Andrews is a 60 y.o. female here for follow up of thyroid. Weight up 2 lbs since last visit in 2/18. Did try the synthroid for 3 weeks after last visit but didn't notice much difference so went back to the generic and stayed on 7.5 tabs/week. Hasn't missed or run out. Takes the levothyroxine first thing in the morning with just water and waits at least 30 minutes before eating and coffee. Vitamin is at night. Bowels are a little loose but likely due to food. Temperature is mostly comfortable. Some brittle nails that are unchanged. No hair loss or dry skin. Overall energy is not too bad. Current Outpatient Medications   Medication Sig    valsartan (DIOVAN) 160 mg tablet Take  by mouth daily.  clonazePAM (KLONOPIN) 0.5 mg tablet Take 1 Tab by mouth nightly as needed.  levothyroxine (SYNTHROID) 88 mcg tablet Take 1 tab 6 days a week and 1.5 tabs once a week    zolpidem (AMBIEN) 10 mg tablet Take 1 Tab by mouth nightly as needed for Sleep. Max Daily Amount: 10 mg.    escitalopram oxalate (LEXAPRO) 10 mg tablet Take 1 Tab by mouth daily.  estradiol (ESTRACE) 0.5 mg tablet TAKE 1 TABLET BY MOUTH EVERY DAY    simvastatin (ZOCOR) 20 mg tablet TAKE 1 TABLET NIGHTLY (Patient taking differently: 20 mg. TAKE 1 TABLET NIGHTLY)    cetirizine (ZYRTEC) 10 mg tablet Take 1 Tab by mouth daily as needed for Allergies.  cycloSPORINE (RESTASIS) 0.05 % ophthalmic emulsion Administer 1 Drop to both eyes two (2) times a day.  PV W-O OXANA/FERROUS FUMARATE/FA (M-VIT PO) Take 1 Tab by mouth daily. No current facility-administered medications for this visit.       Allergies   Allergen Reactions    Bactrim [Sulfamethoprim] Nausea and Vomiting    Omnicef [Cefdinir] Nausea and Vomiting    Aspirin Other (comments)     abd pain and cramping    Clindamycin Other (comments) Abdominal pain    Ibuprofen Other (comments)     abd pain and cramping    Lisinopril Cough    Nsaids (Non-Steroidal Anti-Inflammatory Drug) Other (comments)     Pain and burning    Pcn [Penicillins] Rash    Tetracycline Nausea and Vomiting     Review of Systems:  - Cardiovascular: no chest pain  - Neurological: no tremors  - Integumentary: skin is normal    Physical Examination:  Blood pressure 118/82, pulse (!) 59, height 5' 3\" (1.6 m), weight 161 lb 3.2 oz (73.1 kg). - General: pleasant, no distress, good eye contact   - Neck: small goiter, no carotid bruits  - Cardiovascular: regular, normal rate, nl s1 and s2, no m/r/g   - Respiratory: clear to auscultation bilaterally   - Integumentary: skin is normal, no edema  - Neurological: reflexes 2+ at biceps, no tremors  - Psychiatric: normal mood and affect    Data Reviewed:   Component      Latest Ref Rng & Units 1/30/2019 1/30/2019           8:12 AM  8:12 AM   T4, Free      0.82 - 1.77 ng/dL  1.20   TSH      0.450 - 4.500 uIU/mL 0.773        Assessment/Plan:     1. Unspecified hypothyroidism: She has a FH of hashimoto's disease and her TSH was mildly high at 5.19 in 8/12 after previously having normal values in Feb 2010-12. I repeated her TSH and it was 3.67 in 12/12 with a normal TPO ab but we decided to give a 3 month trial of levothyroxine 50 mcg daily and she felt much better and TSH was down to 1.63 in 4/13 and the same in 10/13 and 1.2 in 10/14. TSH up to 3.76 in 7/15 so increased her dose to 75 mcg daily and TSH down to 0.9 in 10/15 so kept her dose the same. TSH up to 2.68 in 10/16 so I increased her dose to 88 mcg daily and TSH 0.95 in 12/16. TSH down to 0.85 in 5/17 with Dr. Scott Medina so he decreased her dose to 6.5 tabs/week and TSH up to 2.65 in 7/17 so went back to 1 tab daily and TSH 2.76 in 12/16 and I increased her dose to 7.5 tabs/week and TSH down to 1.12 in 2/18.   It's possible some of her fluctuation is from generic so gave a trial of brand synthroid to see if she feels better on this but she didn't so went back to generic levothyroxine at 7.5 tabs/week and TSH 0.773 in 1/19 so kept dose the same. - cont levothyroxine 88 mcg 7.5 tabs/week  - check TSH and free T4 prior to next visit        Patient Instructions   1) TSH is a thyroid test.  Your level is 0.77 which is normal and at goal of 0.5-2.0. This test goes opposite of your thyroid dose and suggests your dose of levothyroxine is perfect so I will keep your dose the same. 2) I will plan on seeing you back in one year but if you feel you need to have your labs checked sooner, please let me know. Follow-up Disposition:  Return in about 1 year (around 2/22/2020). Copy sent to:  Dr. Jeramy Villatoro via [facility]  Dr. Sheila Gonzales [phone]    Addendum: 8/20/19    Component      Latest Ref Rng & Units 8/13/2019 8/13/2019           8:21 AM  8:21 AM   TSH      0.450 - 4.500 uIU/mL  1.680   T4, Free      0.82 - 1.77 ng/dL 1.14      We had the following e-mail exchange:    I just returned from vacation today and saw that Dr. Michelle Guillaume ordered some labs for you. Your TSH is still normal at 1.68 but is up from 0.77 in January. Since this test goes opposite of your thyroid function and previously you felt better when your TSH was under 1, I recommend using up the 88 mcg tabs by taking 1 tab on Mon-Sat and 2 tabs on Sunday. I sent 100 mcg tabs to your local pharmacy to start taking 1 tab 7 days a week when you use up the 88 mcg tabs.  ===View-only below this line===      ----- Message -----     From: Luther Riedel     Sent: 8/5/2019 10:38 PM EDT       To: July Jackson MD  Subject: Non-Urgent Medical Question    Hi Dr. Brisa Patel,  Is it possible to have my thyroid levels drawn now? I am experiencing some increased fatigue and some hair loss, so I want to be certain that my thyroid is not responsible for this.   Thanks,  Raffi Kelley

## 2019-03-04 DIAGNOSIS — F51.01 PRIMARY INSOMNIA: ICD-10-CM

## 2019-03-04 RX ORDER — ZOLPIDEM TARTRATE 10 MG/1
10 TABLET ORAL
Qty: 90 TAB | Refills: 1 | Status: SHIPPED | OUTPATIENT
Start: 2019-03-04 | End: 2019-03-10 | Stop reason: SDUPTHER

## 2019-03-04 NOTE — TELEPHONE ENCOUNTER
PCP: Edvin Salazar MD    Last appt: 12/7/2018  Future Appointments   Date Time Provider Kaushal Worthington   2/21/2020  8:30 AM Maria Elena Diaz MD [facility] [address]       Requested Prescriptions     Pending Prescriptions Disp Refills    zolpidem (AMBIEN) 10 mg tablet 90 Tab 1     Sig: Take 1 Tab by mouth nightly as needed for Sleep. Max Daily Amount: 10 mg.

## 2019-03-10 DIAGNOSIS — F51.01 PRIMARY INSOMNIA: ICD-10-CM

## 2019-03-11 RX ORDER — ZOLPIDEM TARTRATE 10 MG/1
TABLET ORAL
Qty: 90 TAB | Refills: 1 | Status: SHIPPED | OUTPATIENT
Start: 2019-03-11 | End: 2019-09-14 | Stop reason: SDUPTHER

## 2019-03-13 ENCOUNTER — TELEPHONE (OUTPATIENT)
Dept: INTERNAL MEDICINE CLINIC | Age: 64
End: 2019-03-13

## 2019-03-13 ENCOUNTER — DOCUMENTATION ONLY (OUTPATIENT)
Dept: INTERNAL MEDICINE CLINIC | Age: 64
End: 2019-03-13

## 2019-03-13 NOTE — PROGRESS NOTES
The following prescription was faxed into pt's [facility]  pharmacy w/ confirmation received:

## 2019-03-14 RX ORDER — ESCITALOPRAM OXALATE 10 MG/1
10 TABLET ORAL DAILY
Qty: 90 TAB | Refills: 3 | Status: SHIPPED | OUTPATIENT
Start: 2019-03-14 | End: 2020-02-23

## 2019-04-03 DIAGNOSIS — G25.81 RLS (RESTLESS LEGS SYNDROME): ICD-10-CM

## 2019-04-04 ENCOUNTER — TELEPHONE (OUTPATIENT)
Dept: INTERNAL MEDICINE CLINIC | Age: 64
End: 2019-04-04

## 2019-04-04 RX ORDER — CLONAZEPAM 0.5 MG/1
0.5 TABLET ORAL
Qty: 90 TAB | Refills: 1 | Status: SHIPPED | OUTPATIENT
Start: 2019-04-04 | End: 2019-10-14 | Stop reason: SDUPTHER

## 2019-04-24 RX ORDER — LEVOTHYROXINE SODIUM 88 UG/1
TABLET ORAL
Qty: 96 TAB | Refills: 3 | Status: SHIPPED | OUTPATIENT
Start: 2019-04-24 | End: 2019-08-20 | Stop reason: DRUGHIGH

## 2019-07-17 RX ORDER — SIMVASTATIN 20 MG/1
TABLET, FILM COATED ORAL
Qty: 90 TAB | Refills: 3 | Status: SHIPPED | OUTPATIENT
Start: 2019-07-17 | End: 2019-10-25 | Stop reason: SDUPTHER

## 2019-08-06 ENCOUNTER — TELEPHONE (OUTPATIENT)
Dept: INTERNAL MEDICINE CLINIC | Age: 64
End: 2019-08-06

## 2019-08-06 NOTE — TELEPHONE ENCOUNTER
----- Message from Lacie Murillo sent at 8/6/2019 12:11 PM EDT -----  Regarding: Dr. Elie Lopez: [phone]  I had not idea that the scheduling would go out so far. Unfortunately, I will be out of town on 10/18/19. Additionally, I will be out of town from 9/16/19 through 9/26/19. Perhaps I could get a 745 or 800 am appointment on a R Thursday, if Friday is not available. Sorry for the inconvenience. Manish Luo  ----- Message -----  From: Lacie Murillo  Sent: 7/19/2019 11:07 AM EDT  To: Minor [place]  Subject: RE: Appointment Request  Hello,    Your appointment has been scheduled for Friday, October 18, 2019 09:15 AM. Please arrive 15min prior and bring photo ID and your ins card. We look forward to seeing you!      ----- Message -----     From: Luiz Hernandez Sent: 7/18/2019  8:46 PM EDT       To: Patient Appointment Schedule Request Mailing List  Subject: Appointment Request    Appointment Request From: Minor France    With Provider: Azam Oneil MD [facility]]    Preferred Date Range: Any    Preferred Times: Any time    Reason for visit: Request an Appointment    Comments:  Yearly checkup. I would like a Friday appointment, preferably the first appointment of the day.  I am not available on 8/9/19 or 8/16/19.         Message copied/pasted from [employer]

## 2019-08-11 DIAGNOSIS — E03.4 HYPOTHYROIDISM DUE TO ACQUIRED ATROPHY OF THYROID: Primary | ICD-10-CM

## 2019-08-14 LAB
T4 FREE SERPL-MCNC: 1.14 NG/DL (ref 0.82–1.77)
TSH SERPL DL<=0.005 MIU/L-ACNC: 1.68 UIU/ML (ref 0.45–4.5)

## 2019-08-20 RX ORDER — LEVOTHYROXINE SODIUM 100 UG/1
100 TABLET ORAL
Qty: 90 TAB | Refills: 3 | Status: SHIPPED | OUTPATIENT
Start: 2019-08-20 | End: 2020-08-18

## 2019-09-14 DIAGNOSIS — F51.01 PRIMARY INSOMNIA: ICD-10-CM

## 2019-09-14 RX ORDER — ZOLPIDEM TARTRATE 10 MG/1
TABLET ORAL
Qty: 90 TAB | Refills: 1 | Status: SHIPPED | OUTPATIENT
Start: 2019-09-14 | End: 2019-10-25 | Stop reason: SDUPTHER

## 2019-09-16 RX ORDER — ZOLPIDEM TARTRATE 10 MG/1
10 TABLET ORAL
Qty: 90 TAB | Refills: 1 | Status: SHIPPED | OUTPATIENT
Start: 2019-09-16 | End: 2020-05-05 | Stop reason: SDUPTHER

## 2019-09-16 NOTE — TELEPHONE ENCOUNTER
PCP: Bishop Bajwa MD    Last appt: 12/7/2018  Future Appointments   Date Time Provider Kaushal Worthington   10/18/2019  9:15 AM Bishop Bajwa MD [address]   10/25/2019  9:00 AM Bishop Bajwa MD [address]   2/21/2020  8:30 AM Yaritza Medina MD [facility] [address] [facility]       Requested Prescriptions     Pending Prescriptions Disp Refills    zolpidem (AMBIEN) 10 mg tablet 90 Tab 1

## 2019-09-17 ENCOUNTER — TELEPHONE (OUTPATIENT)
Dept: INTERNAL MEDICINE CLINIC | Age: 64
End: 2019-09-17

## 2019-09-30 RX ORDER — LOSARTAN POTASSIUM 50 MG/1
50 TABLET ORAL DAILY
Qty: 90 TAB | Refills: 3 | Status: SHIPPED | OUTPATIENT
Start: 2019-09-30 | End: 2020-10-03 | Stop reason: SDUPTHER

## 2019-10-14 DIAGNOSIS — G25.81 RLS (RESTLESS LEGS SYNDROME): ICD-10-CM

## 2019-10-14 RX ORDER — CLONAZEPAM 0.5 MG/1
0.5 TABLET ORAL
Qty: 90 TAB | Refills: 1 | Status: SHIPPED | OUTPATIENT
Start: 2019-10-14 | End: 2020-06-03 | Stop reason: SDUPTHER

## 2019-10-25 ENCOUNTER — OFFICE VISIT (OUTPATIENT)
Dept: INTERNAL MEDICINE CLINIC | Age: 64
End: 2019-10-25

## 2019-10-25 VITALS
TEMPERATURE: 97.7 F | HEART RATE: 71 BPM | RESPIRATION RATE: 16 BRPM | DIASTOLIC BLOOD PRESSURE: 74 MMHG | HEIGHT: 63 IN | WEIGHT: 163 LBS | OXYGEN SATURATION: 98 % | BODY MASS INDEX: 28.88 KG/M2 | SYSTOLIC BLOOD PRESSURE: 106 MMHG

## 2019-10-25 DIAGNOSIS — Z00.00 ROUTINE GENERAL MEDICAL EXAMINATION AT A HEALTH CARE FACILITY: Primary | ICD-10-CM

## 2019-10-25 DIAGNOSIS — E78.00 PURE HYPERCHOLESTEROLEMIA: ICD-10-CM

## 2019-10-25 DIAGNOSIS — I10 ESSENTIAL HYPERTENSION: ICD-10-CM

## 2019-10-25 LAB
APPEARANCE UR: CLEAR
BILIRUB UR QL STRIP: NEGATIVE
COLOR UR: YELLOW
GLUCOSE UR QL: NEGATIVE
HGB UR QL STRIP: NEGATIVE
KETONES UR QL STRIP: NEGATIVE
LEUKOCYTE ESTERASE UR QL STRIP: NEGATIVE
MICRO URNS: NORMAL
NITRITE UR QL STRIP: NEGATIVE
PH UR STRIP: 6 [PH] (ref 5–7.5)
PROT UR QL STRIP: NEGATIVE
SP GR UR: 1.01 (ref 1–1.03)
UROBILINOGEN UR STRIP-MCNC: 0.2 MG/DL (ref 0.2–1)

## 2019-10-25 NOTE — PROGRESS NOTES
HISTORY OF PRESENT ILLNESS  Raul Flores is a 60 y.o. female. HPI   Pt here for CPE  F/u MDD , htn hld hypothyroid insomnia RLS   due for colon screen  Takes ambien 4-6 nights per week  klonazepam is taken prn for RSLS  Sees Dr Best Hayden who manages her  Hypothyroidism  lexapro helping for MDD  Saw gyn MD 5 months ago at [address]  Declines colonoscopy      Last OV  Here in ED f/u sigmoid colitis  Was hydrated 2l IVF, feels better overall now  Creatinine up to 1.87 down to 0.9 after IVF  Lactate wnl  Sent home with oral abx--cipro and flagyl. Stool cx was ordered  Normal cbc  LLQ Pain 2/10 from 7/10  No diarrhea or bloody stools since ED visit  Some epigastric discomfort since starting abx    Patient Active Problem List    Diagnosis Date Noted    Dry eyes 09/18/2015    Hypothyroidism due to acquired atrophy of thyroid 12/31/2012    Essential hypertension, benign 08/18/2009    Pure hypercholesterolemia 08/18/2009    RLS (restless legs syndrome) 08/18/2009     Current Outpatient Medications   Medication Sig Dispense Refill    clonazePAM (KLONOPIN) 0.5 mg tablet Take 1 Tab by mouth nightly as needed (RLS). 90 Tab 1    losartan (COZAAR) 50 mg tablet Take 1 Tab by mouth daily. 90 Tab 3    zolpidem (AMBIEN) 10 mg tablet Take 1 Tab by mouth nightly as needed for Sleep. Max Daily Amount: 10 mg. 90 Tab 1    zolpidem (AMBIEN) 10 mg tablet TAKE 1 TABLET BY MOUTH EVERYDAY AT BEDTIME 90 Tab 1    levothyroxine (SYNTHROID) 100 mcg tablet Take 1 Tab by mouth Daily (before breakfast). New higher dose replaces prior script on file for 88 mcg tabs 90 Tab 3    simvastatin (ZOCOR) 20 mg tablet TAKE 1 TABLET NIGHTLY 90 Tab 3    escitalopram oxalate (LEXAPRO) 10 mg tablet Take 1 Tab by mouth daily. 90 Tab 3    valsartan (DIOVAN) 160 mg tablet Take  by mouth daily.  estradiol (ESTRACE) 0.5 mg tablet TAKE 1 TABLET BY MOUTH EVERY DAY  2    simvastatin (ZOCOR) 20 mg tablet TAKE 1 TABLET NIGHTLY (Patient taking differently: 20 mg. TAKE 1 TABLET NIGHTLY) 90 Tab 3    cetirizine (ZYRTEC) 10 mg tablet Take 1 Tab by mouth daily as needed for Allergies. 90 Tab 3    cycloSPORINE (RESTASIS) 0.05 % ophthalmic emulsion Administer 1 Drop to both eyes two (2) times a day.  PV W-O OXANA/FERROUS FUMARATE/FA (M-VIT PO) Take 1 Tab by mouth daily.        Allergies   Allergen Reactions    Bactrim [Sulfamethoprim] Nausea and Vomiting    Omnicef [Cefdinir] Nausea and Vomiting    Aspirin Other (comments)     abd pain and cramping    Clindamycin Other (comments)     Abdominal pain    Ibuprofen Other (comments)     abd pain and cramping    Lisinopril Cough    Nsaids (Non-Steroidal Anti-Inflammatory Drug) Other (comments)     Pain and burning    Pcn [Penicillins] Rash    Tetracycline Nausea and Vomiting      Lab Results   Component Value Date/Time    WBC 9.7 12/05/2018 01:03 PM    HGB 13.2 12/05/2018 01:03 PM    HCT 39.7 12/05/2018 01:03 PM    PLATELET 661 53/28/1094 01:03 PM    MCV 93.4 12/05/2018 01:03 PM     Lab Results   Component Value Date/Time    Glucose 92 12/05/2018 01:03 PM    LDL, calculated 78 05/26/2017 08:25 AM    Creatinine (POC) 0.9 12/05/2018 06:57 PM    Creatinine 1.87 (H) 12/05/2018 01:03 PM      Lab Results   Component Value Date/Time    Cholesterol, total 167 05/26/2017 08:25 AM    Cholesterol (POC) 191 02/28/2014 08:52 AM    HDL Cholesterol 77 05/26/2017 08:25 AM    LDL, calculated 78 05/26/2017 08:25 AM    LDL Cholesterol (POC) n/a 02/28/2014 08:52 AM    Triglyceride 59 05/26/2017 08:25 AM    Triglycerides (POC) <45 02/28/2014 08:52 AM    CHOL/HDL Ratio 2.4 08/09/2010 03:43 PM     Lab Results   Component Value Date/Time    GFR est non-AA 27 (L) 12/05/2018 01:03 PM    GFRNA, POC >60 12/05/2018 06:57 PM    GFR est AA 33 (L) 12/05/2018 01:03 PM    GFRAA, POC >60 12/05/2018 06:57 PM    Creatinine 1.87 (H) 12/05/2018 01:03 PM    Creatinine (POC) 0.9 12/05/2018 06:57 PM    BUN 9 12/05/2018 01:03 PM    Sodium 140 12/05/2018 01:03 PM Potassium 3.9 12/05/2018 01:03 PM    Chloride 107 12/05/2018 01:03 PM    CO2 24 12/05/2018 01:03 PM        ROS    Physical Exam   Constitutional: She appears well-developed and well-nourished. Appears stated age   Cardiovascular: Normal rate, regular rhythm and normal heart sounds. Exam reveals no gallop and no friction rub. No murmur heard. Pulmonary/Chest: Effort normal and breath sounds normal. No respiratory distress. She has no wheezes. She has no rales. She exhibits no tenderness. Abdominal: Soft. Bowel sounds are normal.   Musculoskeletal: She exhibits no edema. Neurological: She is alert. Psychiatric: She has a normal mood and affect. Nursing note and vitals reviewed. ASSESSMENT and PLAN  Diagnoses and all orders for this visit:    1. Routine general medical examination at a health care facility  -     CBC W/O DIFF  -     METABOLIC PANEL, COMPREHENSIVE  -     LIPID PANEL  -     URINALYSIS W/ RFLX MICROSCOPIC  -     OCCULT BLOOD IMMUNOASSAY,DIAGNOSTIC   advised colonoscopy -pt declines. UTD well woman care   Recommended shingrix   Refer to [facility] for skin check but no suspicious moles seen on exam today  2. Essential hypertension  -     CBC W/O DIFF  -     METABOLIC PANEL, COMPREHENSIVE   controlled  3. Pure hypercholesterolemia  -     LIPID PANEL   On statin   4. MDD   Controlled, remission  5. Insomnia   On ambien    6. Hypothyroidism   Per Dr Candis Judd and Dispositions    · Return in about 1 year (around 10/25/2020) for cpe.

## 2019-10-25 NOTE — PATIENT INSTRUCTIONS
Office Policies    Phone calls/patient messages:            Please allow up to 24 hours for someone in the office to contact you about your call or message. Be mindful your provider may be out of the office or your message may require further review. We encourage you to use [employer] for your messages as this is a faster, more efficient way to communicate with our office                         Medication Refills:            Prescription medications require 48-72 business hours to process. We encourage you to use [facility] for your refills. For controlled medications: Please allow 72 business hours to process. Certain medications may require you to  a written prescription at our office. NO narcotic/controlled medications will be prescribed after 4pm Monday through Friday or on weekends              Form/Paperwork Completion:            Please note a $25 fee may incur for all paperwork for completed by our providers. We ask that you allow 7-10 business days. Pre-payment is due prior to picking up/faxing the completed form. You may also download your forms to [employer] to have your doctor print off.

## 2019-10-25 NOTE — PROGRESS NOTES
Chief Complaint   Patient presents with    Cholesterol Problem     Routine care    Hypertension     Routine care    Complete Physical     Annual

## 2019-10-26 LAB
ALBUMIN SERPL-MCNC: 4.3 G/DL (ref 3.6–4.8)
ALBUMIN/GLOB SERPL: 1.7 {RATIO} (ref 1.2–2.2)
ALP SERPL-CCNC: 53 IU/L (ref 39–117)
ALT SERPL-CCNC: 12 IU/L (ref 0–32)
AST SERPL-CCNC: 16 IU/L (ref 0–40)
BILIRUB SERPL-MCNC: 0.3 MG/DL (ref 0–1.2)
BUN SERPL-MCNC: 19 MG/DL (ref 8–27)
BUN/CREAT SERPL: 23 (ref 12–28)
CALCIUM SERPL-MCNC: 9.8 MG/DL (ref 8.7–10.3)
CHLORIDE SERPL-SCNC: 103 MMOL/L (ref 96–106)
CHOLEST SERPL-MCNC: 210 MG/DL (ref 100–199)
CO2 SERPL-SCNC: 19 MMOL/L (ref 20–29)
CREAT SERPL-MCNC: 0.84 MG/DL (ref 0.57–1)
ERYTHROCYTE [DISTWIDTH] IN BLOOD BY AUTOMATED COUNT: 13.9 % (ref 12.3–15.4)
GLOBULIN SER CALC-MCNC: 2.5 G/DL (ref 1.5–4.5)
GLUCOSE SERPL-MCNC: 96 MG/DL (ref 65–99)
HCT VFR BLD AUTO: 39.9 % (ref 34–46.6)
HDLC SERPL-MCNC: 91 MG/DL
HGB BLD-MCNC: 13.5 G/DL (ref 11.1–15.9)
LDLC SERPL CALC-MCNC: 104 MG/DL (ref 0–99)
MCH RBC QN AUTO: 30 PG (ref 26.6–33)
MCHC RBC AUTO-ENTMCNC: 33.8 G/DL (ref 31.5–35.7)
MCV RBC AUTO: 89 FL (ref 79–97)
PLATELET # BLD AUTO: 207 X10E3/UL (ref 150–450)
POTASSIUM SERPL-SCNC: 4.1 MMOL/L (ref 3.5–5.2)
PROT SERPL-MCNC: 6.8 G/DL (ref 6–8.5)
RBC # BLD AUTO: 4.5 X10E6/UL (ref 3.77–5.28)
SODIUM SERPL-SCNC: 139 MMOL/L (ref 134–144)
TRIGL SERPL-MCNC: 74 MG/DL (ref 0–149)
VLDLC SERPL CALC-MCNC: 15 MG/DL (ref 5–40)
WBC # BLD AUTO: 4.6 X10E3/UL (ref 3.4–10.8)

## 2019-11-17 LAB — HEMOCCULT STL QL IA: NEGATIVE

## 2020-02-04 DIAGNOSIS — E03.4 HYPOTHYROIDISM DUE TO ACQUIRED ATROPHY OF THYROID: ICD-10-CM

## 2020-02-14 LAB
T4 FREE SERPL-MCNC: 1.26 NG/DL (ref 0.82–1.77)
TSH SERPL DL<=0.005 MIU/L-ACNC: 0.98 UIU/ML (ref 0.45–4.5)

## 2020-02-21 ENCOUNTER — OFFICE VISIT (OUTPATIENT)
Dept: ENDOCRINOLOGY | Age: 65
End: 2020-02-21

## 2020-02-21 VITALS
BODY MASS INDEX: 28.39 KG/M2 | HEIGHT: 63 IN | DIASTOLIC BLOOD PRESSURE: 73 MMHG | HEART RATE: 64 BPM | SYSTOLIC BLOOD PRESSURE: 123 MMHG | WEIGHT: 160.2 LBS

## 2020-02-21 DIAGNOSIS — E03.4 HYPOTHYROIDISM DUE TO ACQUIRED ATROPHY OF THYROID: Primary | ICD-10-CM

## 2020-02-21 NOTE — PROGRESS NOTES
Chief Complaint   Patient presents with    Thyroid Problem     pcpo and pharmacy confirmed     History of Present Illness: Roland Doctor is a 63 y.o. female here for follow up of thyroid. Weight down 1 lbs since last visit in 2/19. Has been taking the higher dose of levothyroxine 100 mcg 7 days a week since August and hasn't missed any doses. Energy and hair loss improved on the higher dose. Bowels are regular. Overall temperature is fine. No dry skin or brittle nails. No major change to health since last visit. Current Outpatient Medications   Medication Sig    clonazePAM (KLONOPIN) 0.5 mg tablet Take 1 Tab by mouth nightly as needed (RLS).  losartan (COZAAR) 50 mg tablet Take 1 Tab by mouth daily.  zolpidem (AMBIEN) 10 mg tablet Take 1 Tab by mouth nightly as needed for Sleep. Max Daily Amount: 10 mg. (Patient taking differently: Take 10 mg by mouth nightly as needed for Sleep.)    levothyroxine (SYNTHROID) 100 mcg tablet Take 1 Tab by mouth Daily (before breakfast). New higher dose replaces prior script on file for 88 mcg tabs    escitalopram oxalate (LEXAPRO) 10 mg tablet Take 1 Tab by mouth daily.  estradiol (ESTRACE) 0.5 mg tablet TAKE 1 TABLET BY MOUTH EVERY DAY    simvastatin (ZOCOR) 20 mg tablet TAKE 1 TABLET NIGHTLY (Patient taking differently: 20 mg. TAKE 1 TABLET NIGHTLY)    cetirizine (ZYRTEC) 10 mg tablet Take 1 Tab by mouth daily as needed for Allergies. (Patient taking differently: Take 10 mg by mouth daily as needed for Allergies.)    cycloSPORINE (RESTASIS) 0.05 % ophthalmic emulsion Administer 1 Drop to both eyes two (2) times a day.  PV W-O OXANA/FERROUS FUMARATE/FA (M-VIT PO) Take 1 Tab by mouth daily. No current facility-administered medications for this visit.       Allergies   Allergen Reactions    Bactrim [Sulfamethoprim] Nausea and Vomiting    Omnicef [Cefdinir] Nausea and Vomiting    Aspirin Other (comments)     abd pain and cramping    Clindamycin Other (comments)     Abdominal pain    Ibuprofen Other (comments)     abd pain and cramping    Lisinopril Cough    Nsaids (Non-Steroidal Anti-Inflammatory Drug) Other (comments)     Pain and burning    Pcn [Penicillins] Rash    Tetracycline Nausea and Vomiting     Review of Systems:  - Cardiovascular: no chest pain or palpitations  - Neurological: no tremors  - Integumentary: skin is normal    Physical Examination:  Blood pressure 123/73, pulse 64, height 5' 3\" (1.6 m), weight 160 lb 3.2 oz (72.7 kg). - General: pleasant, no distress, good eye contact   - Neck: small goiter  - Cardiovascular: regular, normal rate, nl s1 and s2, no m/r/g   - Respiratory: clear to auscultation bilaterally   - Integumentary: skin is normal, no edema  - Neurological: reflexes 2+ at biceps, no tremors  - Psychiatric: normal mood and affect    Data Reviewed:   Component      Latest Ref Rng & Units 2/13/2020 2/13/2020           8:03 AM  8:03 AM   TSH      0.450 - 4.500 uIU/mL  0.980   T4, Free      0.82 - 1.77 ng/dL 1.26        Assessment/Plan:     1. Unspecified hypothyroidism: She has a FH of hashimoto's disease and her TSH was mildly high at 5.19 in 8/12 after previously having normal values in Feb 2010-12. I repeated her TSH and it was 3.67 in 12/12 with a normal TPO ab but we decided to give a 3 month trial of levothyroxine 50 mcg daily and she felt much better and TSH was down to 1.63 in 4/13 and the same in 10/13 and 1.2 in 10/14. TSH up to 3.76 in 7/15 so increased her dose to 75 mcg daily and TSH down to 0.9 in 10/15 so kept her dose the same. TSH up to 2.68 in 10/16 so I increased her dose to 88 mcg daily and TSH 0.95 in 12/16. TSH down to 0.85 in 5/17 with Dr. Audelia Rodriguez so he decreased her dose to 6.5 tabs/week and TSH up to 2.65 in 7/17 so went back to 1 tab daily and TSH 2.76 in 12/16 and I increased her dose to 7.5 tabs/week and TSH down to 1.12 in 2/18.   It's possible some of her fluctuation is from generic so gave a trial of brand synthroid to see if she feels better on this but she didn't so went back to generic levothyroxine at 7.5 tabs/week and TSH 0.773 in 1/19 so kept dose the same. TSH up to 1.68 in 8/19 so increased dose to 100 mcg daily and TSH 0.98 in 2/20 so kept dose the same. - cont levothyroxine 100 mcg daily  - check TSH and free T4 prior to next visit        Patient Instructions   1) TSH is a thyroid test.  Your level is 0.98 which is normal and at goal of 0.5-1.0. This test goes opposite of your thyroid dose and suggests your dose of levothyroxine is perfect so I will keep your dose the same. 2) I will plan on seeing you back in one year but if you feel you need to have your labs checked sooner, please let me know. Follow-up and Dispositions    · Return in about 1 year (around 2/21/2021).                Copy sent to:  Dr. Za Lawson via [facility]  Dr. Tereza Garcia [phone]

## 2020-02-21 NOTE — PATIENT INSTRUCTIONS
1) TSH is a thyroid test.  Your level is 0.98 which is normal and at goal of 0.5-1.0. This test goes opposite of your thyroid dose and suggests your dose of levothyroxine is perfect so I will keep your dose the same. 2) I will plan on seeing you back in one year but if you feel you need to have your labs checked sooner, please let me know.

## 2020-02-23 RX ORDER — ESCITALOPRAM OXALATE 10 MG/1
TABLET ORAL
Qty: 90 TAB | Refills: 3 | Status: SHIPPED | OUTPATIENT
Start: 2020-02-23 | End: 2020-11-27 | Stop reason: SDUPTHER

## 2020-05-05 DIAGNOSIS — F51.01 PRIMARY INSOMNIA: ICD-10-CM

## 2020-05-06 RX ORDER — ZOLPIDEM TARTRATE 10 MG/1
10 TABLET ORAL
Qty: 90 TAB | Refills: 1 | Status: SHIPPED | OUTPATIENT
Start: 2020-05-06 | End: 2021-01-07 | Stop reason: SDUPTHER

## 2020-06-03 DIAGNOSIS — G25.81 RLS (RESTLESS LEGS SYNDROME): ICD-10-CM

## 2020-06-04 RX ORDER — CLONAZEPAM 0.5 MG/1
0.5 TABLET ORAL
Qty: 90 TAB | Refills: 1 | Status: SHIPPED | OUTPATIENT
Start: 2020-06-04 | End: 2021-01-07 | Stop reason: SDUPTHER

## 2020-07-29 ENCOUNTER — EMPLOYEE WELLNESS (OUTPATIENT)
Dept: OTHER | Facility: CLINIC | Age: 65
End: 2020-07-29

## 2020-07-29 LAB
CHOLEST SERPL-MCNC: 198 MG/DL
GLUCOSE SERPL-MCNC: 93 MG/DL (ref 65–100)
HDLC SERPL-MCNC: 77 MG/DL
LDLC SERPL CALC-MCNC: 95.8 MG/DL (ref 0–100)
TRIGL SERPL-MCNC: 126 MG/DL (ref ?–150)

## 2020-08-18 RX ORDER — LEVOTHYROXINE SODIUM 100 UG/1
TABLET ORAL
Qty: 90 TAB | Refills: 3 | Status: SHIPPED | OUTPATIENT
Start: 2020-08-18 | End: 2020-11-27 | Stop reason: SDUPTHER

## 2020-09-24 DIAGNOSIS — E78.01 FAMILIAL HYPERCHOLESTEROLEMIA: ICD-10-CM

## 2020-09-24 RX ORDER — SIMVASTATIN 20 MG/1
TABLET, FILM COATED ORAL
Qty: 90 TAB | Refills: 0 | Status: SHIPPED | OUTPATIENT
Start: 2020-09-24 | End: 2020-11-27 | Stop reason: SDUPTHER

## 2020-10-05 RX ORDER — LOSARTAN POTASSIUM 50 MG/1
50 TABLET ORAL DAILY
Qty: 90 TAB | Refills: 3 | Status: SHIPPED | OUTPATIENT
Start: 2020-10-05 | End: 2020-11-27 | Stop reason: SDUPTHER

## 2020-11-27 DIAGNOSIS — E78.01 FAMILIAL HYPERCHOLESTEROLEMIA: ICD-10-CM

## 2020-11-27 RX ORDER — LEVOTHYROXINE SODIUM 100 UG/1
TABLET ORAL
Qty: 90 TAB | Refills: 0 | Status: SHIPPED | OUTPATIENT
Start: 2020-11-27 | End: 2021-02-19 | Stop reason: SDUPTHER

## 2020-11-27 RX ORDER — ESCITALOPRAM OXALATE 10 MG/1
10 TABLET ORAL DAILY
Qty: 90 TAB | Refills: 0 | Status: SHIPPED | OUTPATIENT
Start: 2020-11-27 | End: 2021-04-10 | Stop reason: SDUPTHER

## 2020-11-27 RX ORDER — SIMVASTATIN 20 MG/1
TABLET, FILM COATED ORAL
Qty: 90 TAB | Refills: 0 | Status: SHIPPED | OUTPATIENT
Start: 2020-11-27 | End: 2020-12-18

## 2020-11-27 RX ORDER — LOSARTAN POTASSIUM 50 MG/1
50 TABLET ORAL DAILY
Qty: 90 TAB | Refills: 0 | Status: SHIPPED | OUTPATIENT
Start: 2020-11-27 | End: 2021-04-10 | Stop reason: SDUPTHER

## 2020-11-27 NOTE — PROGRESS NOTES
Received the following my chart message from patient:    Please send a new prescription for Levothyroxine 100 mcg to [facility], [place] [address], [address] [place] [address]  Fax: [phone]  Thank you,   Heidy allen.     Radha Delarosa, [facility]

## 2020-12-01 ENCOUNTER — TELEPHONE (OUTPATIENT)
Dept: INTERNAL MEDICINE CLINIC | Age: 65
End: 2020-12-01

## 2020-12-01 DIAGNOSIS — Z00.00 ROUTINE GENERAL MEDICAL EXAMINATION AT A HEALTH CARE FACILITY: Primary | ICD-10-CM

## 2020-12-02 NOTE — TELEPHONE ENCOUNTER
Lanie Madera MD  You 14 hours ago (7:14 PM)      Labs ordered, please inform pt    Message text      Lab orders mailed to pt.

## 2020-12-11 ENCOUNTER — TRANSCRIBE ORDER (OUTPATIENT)
Dept: SCHEDULING | Age: 65
End: 2020-12-11

## 2020-12-11 DIAGNOSIS — Z12.31 VISIT FOR SCREENING MAMMOGRAM: Primary | ICD-10-CM

## 2020-12-18 DIAGNOSIS — E78.01 FAMILIAL HYPERCHOLESTEROLEMIA: ICD-10-CM

## 2020-12-18 RX ORDER — SIMVASTATIN 20 MG/1
TABLET, FILM COATED ORAL
Qty: 90 TAB | Refills: 0 | Status: SHIPPED | OUTPATIENT
Start: 2020-12-18 | End: 2021-05-12

## 2021-01-01 LAB
25(OH)D3+25(OH)D2 SERPL-MCNC: 8.7 NG/ML (ref 30–100)
ALBUMIN SERPL-MCNC: 4.2 G/DL (ref 3.8–4.8)
ALBUMIN/GLOB SERPL: 1.8 {RATIO} (ref 1.2–2.2)
ALP SERPL-CCNC: 62 IU/L (ref 39–117)
ALT SERPL-CCNC: 13 IU/L (ref 0–32)
AST SERPL-CCNC: 15 IU/L (ref 0–40)
BILIRUB SERPL-MCNC: 0.3 MG/DL (ref 0–1.2)
BUN SERPL-MCNC: 11 MG/DL (ref 8–27)
BUN/CREAT SERPL: 13 (ref 12–28)
CALCIUM SERPL-MCNC: 8.8 MG/DL (ref 8.7–10.3)
CHLORIDE SERPL-SCNC: 107 MMOL/L (ref 96–106)
CHOLEST SERPL-MCNC: 194 MG/DL (ref 100–199)
CO2 SERPL-SCNC: 20 MMOL/L (ref 20–29)
CREAT SERPL-MCNC: 0.88 MG/DL (ref 0.57–1)
ERYTHROCYTE [DISTWIDTH] IN BLOOD BY AUTOMATED COUNT: 13.9 % (ref 11.7–15.4)
GLOBULIN SER CALC-MCNC: 2.4 G/DL (ref 1.5–4.5)
GLUCOSE SERPL-MCNC: 93 MG/DL (ref 65–99)
HCT VFR BLD AUTO: 40.5 % (ref 34–46.6)
HDLC SERPL-MCNC: 82 MG/DL
HGB BLD-MCNC: 13.3 G/DL (ref 11.1–15.9)
LDLC SERPL CALC-MCNC: 95 MG/DL (ref 0–99)
MCH RBC QN AUTO: 30.4 PG (ref 26.6–33)
MCHC RBC AUTO-ENTMCNC: 32.8 G/DL (ref 31.5–35.7)
MCV RBC AUTO: 93 FL (ref 79–97)
PLATELET # BLD AUTO: 215 X10E3/UL (ref 150–450)
POTASSIUM SERPL-SCNC: 4 MMOL/L (ref 3.5–5.2)
PROT SERPL-MCNC: 6.6 G/DL (ref 6–8.5)
RBC # BLD AUTO: 4.37 X10E6/UL (ref 3.77–5.28)
SODIUM SERPL-SCNC: 140 MMOL/L (ref 134–144)
TRIGL SERPL-MCNC: 94 MG/DL (ref 0–149)
TSH SERPL DL<=0.005 MIU/L-ACNC: 0.85 UIU/ML (ref 0.45–4.5)
VLDLC SERPL CALC-MCNC: 17 MG/DL (ref 5–40)
WBC # BLD AUTO: 5.3 X10E3/UL (ref 3.4–10.8)

## 2021-01-06 NOTE — PROGRESS NOTES
HISTORY OF PRESENT ILLNESS  Karlene Escudero is a 63 y.o. female. HPI     Here for CPE and fu htn hld hypothyroid insmonia and RLS  Had recent labs wnl except vit d 8.7  See Dr Lizzeth Chaney for hypothroidism,  insommia-not sleeping well d/t loss of a recent friend, takes ambien prn but more frequently. Needs refill  RLS--clonazepam a few times per week  MDD-lexapro helps  Will retires next year and move to [facility] -Dr Sultnaa Jaeger  Pt here for CPE     due for colon screen  Takes ambien 4-6 nights per week  klonazepam is taken prn for RSLS  Sees Dr Lizzeth Chaney who manages her  Hypothyroidism  lexapro helping for MDD  Saw gyn MD 5 months ago at [facility] colonoscopy       Patient Active Problem List    Diagnosis Date Noted    Dry eyes 09/18/2015    Hypothyroidism due to acquired atrophy of thyroid 12/31/2012    Essential hypertension, benign 08/18/2009    Pure hypercholesterolemia 08/18/2009    RLS (restless legs syndrome) 08/18/2009     Current Outpatient Medications   Medication Sig Dispense Refill    simvastatin (ZOCOR) 20 mg tablet TAKE 1 TABLET BY MOUTH EVERY DAY AT NIGHT 90 Tab 0    escitalopram oxalate (LEXAPRO) 10 mg tablet Take 1 Tab by mouth daily. 90 Tab 0    losartan (COZAAR) 50 mg tablet Take 1 Tab by mouth daily. 90 Tab 0    levothyroxine (SYNTHROID) 100 mcg tablet TAKE 1 TABLET EVERY DAY BEFORE BREAKFAST 90 Tab 0    clonazePAM (KlonoPIN) 0.5 mg tablet Take 1 Tab by mouth nightly as needed (RLS). 90 Tab 1    zolpidem (AMBIEN) 10 mg tablet Take 1 Tab by mouth nightly as needed for Sleep. Max Daily Amount: 10 mg. 90 Tab 1    estradiol (ESTRACE) 0.5 mg tablet TAKE 1 TABLET BY MOUTH EVERY DAY  2    cetirizine (ZYRTEC) 10 mg tablet Take 1 Tab by mouth daily as needed for Allergies. (Patient taking differently: Take 10 mg by mouth daily as needed for Allergies.) 90 Tab 3    cycloSPORINE (RESTASIS) 0.05 % ophthalmic emulsion Administer 1 Drop to both eyes two (2) times a day.       PV W-O OXANA/FERROUS FUMARATE/FA (M-VIT PO) Take 1 Tab by mouth daily.        Allergies   Allergen Reactions    Bactrim [Sulfamethoprim] Nausea and Vomiting    Omnicef [Cefdinir] Nausea and Vomiting    Aspirin Other (comments)     abd pain and cramping    Clindamycin Other (comments)     Abdominal pain    Ibuprofen Other (comments)     abd pain and cramping    Lisinopril Cough    Nsaids (Non-Steroidal Anti-Inflammatory Drug) Other (comments)     Pain and burning    Pcn [Penicillins] Rash    Tetracycline Nausea and Vomiting      Lab Results   Component Value Date/Time    WBC 5.3 12/31/2020 07:46 AM    HGB 13.3 12/31/2020 07:46 AM    HCT 40.5 12/31/2020 07:46 AM    PLATELET 363 43/83/8184 07:46 AM    MCV 93 12/31/2020 07:46 AM     Lab Results   Component Value Date/Time    Glucose 93 12/31/2020 07:46 AM    LDL, calculated 95 12/31/2020 07:46 AM    LDL, calculated 95.8 07/29/2020 07:24 AM    Creatinine (POC) 0.9 12/05/2018 06:57 PM    Creatinine 0.88 12/31/2020 07:46 AM      Lab Results   Component Value Date/Time    Cholesterol, total 194 12/31/2020 07:46 AM    Cholesterol (POC) 191 02/28/2014 08:52 AM    HDL Cholesterol 82 12/31/2020 07:46 AM    LDL, calculated 95 12/31/2020 07:46 AM    LDL, calculated 95.8 07/29/2020 07:24 AM    LDL Cholesterol (POC) n/a 02/28/2014 08:52 AM    Triglyceride 94 12/31/2020 07:46 AM    Triglycerides (POC) <45 02/28/2014 08:52 AM    CHOL/HDL Ratio 2.4 08/09/2010 03:43 PM     Lab Results   Component Value Date/Time    GFR est non-AA 70 12/31/2020 07:46 AM    GFRNA, POC >60 12/05/2018 06:57 PM    GFR est AA 81 12/31/2020 07:46 AM    GFRAA, POC >60 12/05/2018 06:57 PM    Creatinine 0.88 12/31/2020 07:46 AM    Creatinine (POC) 0.9 12/05/2018 06:57 PM    BUN 11 12/31/2020 07:46 AM    Sodium 140 12/31/2020 07:46 AM    Potassium 4.0 12/31/2020 07:46 AM    Chloride 107 (H) 12/31/2020 07:46 AM    CO2 20 12/31/2020 07:46 AM     Lab Results   Component Value Date/Time    TSH 0.855 12/31/2020 07:46 AM    T4, Free 1.26 02/13/2020 08:03 AM      Lab Results   Component Value Date/Time    Glucose 93 12/31/2020 07:46 AM         Review of Systems   Constitutional: Negative for chills, fever, malaise/fatigue and weight loss. Eyes: Negative for blurred vision and double vision. Respiratory: Negative for cough and shortness of breath. Cardiovascular: Negative for chest pain and palpitations. Gastrointestinal: Negative for abdominal pain, blood in stool, constipation, diarrhea, melena, nausea and vomiting. Genitourinary: Negative for dysuria, frequency, hematuria and urgency. Musculoskeletal: Negative for back pain, falls, joint pain and myalgias. Neurological: Negative for dizziness, tremors and headaches. Physical Exam  Vitals signs and nursing note reviewed. Constitutional:       Appearance: She is well-developed. Comments: Appears stated age   HENT:      Head: Normocephalic. Right Ear: Tympanic membrane normal.      Left Ear: Tympanic membrane normal.      Nose: Nose normal.   Neck:      Musculoskeletal: Normal range of motion. Cardiovascular:      Rate and Rhythm: Normal rate and regular rhythm. Heart sounds: Normal heart sounds. No murmur. No friction rub. No gallop. Pulmonary:      Effort: Pulmonary effort is normal. No respiratory distress. Breath sounds: Normal breath sounds. No wheezing. Abdominal:      General: Bowel sounds are normal.      Palpations: Abdomen is soft. Musculoskeletal: Normal range of motion. Neurological:      General: No focal deficit present. Mental Status: She is alert. Psychiatric:         Mood and Affect: Mood normal.         Behavior: Behavior normal.         Thought Content: Thought content normal.         Judgment: Judgment normal.         ASSESSMENT and PLAN  Diagnoses and all orders for this visit:    1. Breast screening    2.  Routine general medical examination at a health care facility  -     OCCULT BLOOD IMMUNOASSAY,DIAGNOSTIC   utd  Vaccinations   Fu gyn MD annually   Labs reviewed  3. Essential hypertension   controlled  4. Pure hypercholesterolemia   ldl at goal  5. Hypothyroidism, unspecified type   Fu Dr Monterroso-euthyroid  6. Major depressive disorder, remission status unspecified, unspecified whether recurrent   Controlled on lexapro  7. Vitamin D deficiency   Take otc vit d 2000 iu every day    8. RLS/insomnia   Refilled medicines    Follow-up and Dispositions    · Return in about 1 year (around 1/7/2022) for CPE.

## 2021-01-07 ENCOUNTER — OFFICE VISIT (OUTPATIENT)
Dept: INTERNAL MEDICINE CLINIC | Age: 66
End: 2021-01-07
Payer: COMMERCIAL

## 2021-01-07 VITALS
TEMPERATURE: 96.3 F | BODY MASS INDEX: 28.7 KG/M2 | DIASTOLIC BLOOD PRESSURE: 60 MMHG | WEIGHT: 162 LBS | HEART RATE: 87 BPM | HEIGHT: 63 IN | SYSTOLIC BLOOD PRESSURE: 110 MMHG | RESPIRATION RATE: 16 BRPM | OXYGEN SATURATION: 96 %

## 2021-01-07 DIAGNOSIS — F32.9 MAJOR DEPRESSIVE DISORDER, REMISSION STATUS UNSPECIFIED, UNSPECIFIED WHETHER RECURRENT: ICD-10-CM

## 2021-01-07 DIAGNOSIS — G25.81 RLS (RESTLESS LEGS SYNDROME): ICD-10-CM

## 2021-01-07 DIAGNOSIS — F51.01 PRIMARY INSOMNIA: ICD-10-CM

## 2021-01-07 DIAGNOSIS — Z12.39 BREAST SCREENING: Primary | ICD-10-CM

## 2021-01-07 DIAGNOSIS — Z00.00 ROUTINE GENERAL MEDICAL EXAMINATION AT A HEALTH CARE FACILITY: ICD-10-CM

## 2021-01-07 DIAGNOSIS — I10 ESSENTIAL HYPERTENSION: ICD-10-CM

## 2021-01-07 DIAGNOSIS — E03.9 HYPOTHYROIDISM, UNSPECIFIED TYPE: ICD-10-CM

## 2021-01-07 DIAGNOSIS — E78.00 PURE HYPERCHOLESTEROLEMIA: ICD-10-CM

## 2021-01-07 DIAGNOSIS — E55.9 VITAMIN D DEFICIENCY: ICD-10-CM

## 2021-01-07 PROCEDURE — 99396 PREV VISIT EST AGE 40-64: CPT | Performed by: INTERNAL MEDICINE

## 2021-01-07 RX ORDER — CLONAZEPAM 0.5 MG/1
0.5 TABLET ORAL
Qty: 90 TAB | Refills: 1 | Status: SHIPPED | OUTPATIENT
Start: 2021-01-07 | End: 2021-07-06

## 2021-01-07 RX ORDER — ZOLPIDEM TARTRATE 10 MG/1
10 TABLET ORAL
Qty: 90 TAB | Refills: 1 | Status: SHIPPED | OUTPATIENT
Start: 2021-01-07 | End: 2021-07-06

## 2021-01-07 NOTE — TELEPHONE ENCOUNTER
Future Appointments:  Future Appointments   Date Time Provider Kaushal Barrazai   2/5/2021  7:30 AM [facility]   2/19/2021  8:50 AM Yari Cuevas MD [facility] [address] [facility] AMB        Last Appointment With Me:  1/7/2021     Requested Prescriptions     Pending Prescriptions Disp Refills    zolpidem (AMBIEN) 10 mg tablet 90 Tab 1     Sig: Take 1 Tab by mouth nightly as needed for Sleep. Max Daily Amount: 10 mg.    clonazePAM (KlonoPIN) 0.5 mg tablet 90 Tab 1     Sig: Take 1 Tab by mouth nightly as needed (RLS).

## 2021-01-07 NOTE — PATIENT INSTRUCTIONS
Office Policies Phone calls/patient messages: Please allow up to 24 hours for someone in the office to contact you about your call or message. Be mindful your provider may be out of the office or your message may require further review. We encourage you to use [employer] for your messages as this is a faster, more efficient way to communicate with our office Medication Refills: 
         
Prescription medications require 48-72 business hours to process. We encourage you to use [employer] for your refills. For controlled medications: Please allow 72 business hours to process. Certain medications may require you to  a written prescription at our office. NO narcotic/controlled medications will be prescribed after 4pm Monday through Friday or on weekends Form/Paperwork Completion: 
         
Please note a $25 fee may incur for all paperwork for completed by our providers. We ask that you allow 7-10 business days. Pre-payment is due prior to picking up/faxing the completed form. You may also download your forms to [employer] to have your doctor print off.

## 2021-02-04 DIAGNOSIS — E03.4 HYPOTHYROIDISM DUE TO ACQUIRED ATROPHY OF THYROID: ICD-10-CM

## 2021-02-19 ENCOUNTER — VIRTUAL VISIT (OUTPATIENT)
Dept: ENDOCRINOLOGY | Age: 66
End: 2021-02-19
Payer: COMMERCIAL

## 2021-02-19 DIAGNOSIS — E03.4 HYPOTHYROIDISM DUE TO ACQUIRED ATROPHY OF THYROID: Primary | ICD-10-CM

## 2021-02-19 DIAGNOSIS — E55.9 VITAMIN D DEFICIENCY: ICD-10-CM

## 2021-02-19 PROCEDURE — 99214 OFFICE O/P EST MOD 30 MIN: CPT | Performed by: INTERNAL MEDICINE

## 2021-02-19 RX ORDER — ACETAMINOPHEN 500 MG
TABLET ORAL DAILY
COMMUNITY
End: 2021-02-19 | Stop reason: ALTCHOICE

## 2021-02-19 RX ORDER — ERGOCALCIFEROL 1.25 MG/1
CAPSULE ORAL
Qty: 26 CAP | Refills: 3 | Status: SHIPPED | OUTPATIENT
Start: 2021-02-19 | End: 2021-04-25

## 2021-02-19 RX ORDER — LEVOTHYROXINE SODIUM 100 UG/1
TABLET ORAL
Qty: 90 TAB | Refills: 3 | Status: SHIPPED | OUTPATIENT
Start: 2021-02-19 | End: 2022-02-25 | Stop reason: SDUPTHER

## 2021-02-19 NOTE — PATIENT INSTRUCTIONS
1) Your vitamin D level is 8.7 which is very low. Goal is over 30. Please start once a week vitamin D called Ergocalciferol 50,000 units 2 caps together on Sundays. I sent this to [facility]. If taking 2 caps together causes any nausea or headaches, then you can split the dose to 1 cap twice a week. If you miss a dose of vitamin D, it's okay to take it as soon as you remember or just double up the next week. The goal is 8 caps per month. Until this arrives, plan on taking 3 of the 2000 units caps everyday and then switch to the weekly dose and stop the over the counter vitamin D. 
 
2) Your TSH (thyroid test) is perfect so I will keep your dose the same of levothyroxine 3) I put an order directly into the [facility] system to repeat your labs in 2 months and in the 1-2 weeks prior to your next visit so just ask for the order under my name and you will receive a courtesy reminder through [email or web address] to have these drawn. 4) Please come for a follow up visit on 2/25/22 at 8:50am in our [facility] office.

## 2021-02-19 NOTE — PROGRESS NOTES
Chief Complaint   Patient presents with    Thyroid Problem     mychart       **THIS IS A VIRTUAL VISIT VIA A VIDEO SYNCHRONOUS DISCUSSION. PATIENT AGREED TO HAVE THEIR CARE DELIVERED OVER A MYCHART VIDEO VISIT IN PLACE OF THEIR REGULARLY SCHEDULED OFFICE VISIT**    History of Present Illness: Nicolina Brittle is a 61 y.o. female here for follow up of thyroid. No major change to health since last visit in 2/20. Her vitamin D level was quite low at 8.7 in 12/20 and does have some fatigue and joint and muscle pains but her job is very stressful and physical.  Was started on OTC vitamin D 2000 units daily by Dr. Edwin Roca but I told her this dose will not be enough to normalize her levels. Compliant with levothyroxine 100 mcg daily. Bowels are regular. No change in hair, skin, or nails. No heat or cold intolerance. Weight 162 up from 162 in 2/20. Current Outpatient Medications   Medication Sig    cholecalciferol (Vitamin D3) (2,000 UNITS /50 MCG) cap capsule Take  by mouth daily.  zolpidem (AMBIEN) 10 mg tablet Take 1 Tab by mouth nightly as needed for Sleep. Max Daily Amount: 10 mg.    clonazePAM (KlonoPIN) 0.5 mg tablet Take 1 Tab by mouth nightly as needed (RLS).  simvastatin (ZOCOR) 20 mg tablet TAKE 1 TABLET BY MOUTH EVERY DAY AT NIGHT    escitalopram oxalate (LEXAPRO) 10 mg tablet Take 1 Tab by mouth daily.  losartan (COZAAR) 50 mg tablet Take 1 Tab by mouth daily.  levothyroxine (SYNTHROID) 100 mcg tablet TAKE 1 TABLET EVERY DAY BEFORE BREAKFAST    estradiol (ESTRACE) 0.5 mg tablet TAKE 1 TABLET BY MOUTH EVERY DAY    cetirizine (ZYRTEC) 10 mg tablet Take 1 Tab by mouth daily as needed for Allergies. (Patient taking differently: Take 10 mg by mouth daily as needed for Allergies.)    PV W-O OXANA/FERROUS FUMARATE/FA (M-VIT PO) Take 1 Tab by mouth daily. No current facility-administered medications for this visit.       Allergies   Allergen Reactions    Bactrim [Sulfamethoprim] Nausea and Vomiting    Omnicef [Cefdinir] Nausea and Vomiting    Aspirin Other (comments)     abd pain and cramping    Clindamycin Other (comments)     Abdominal pain    Ibuprofen Other (comments)     abd pain and cramping    Lisinopril Cough    Nsaids (Non-Steroidal Anti-Inflammatory Drug) Other (comments)     Pain and burning    Pcn [Penicillins] Rash    Tetracycline Nausea and Vomiting     Review of Systems: PER HPI    Physical Examination:  - GENERAL: NCAT, Appears well nourished   - EYES: EOMI, non-icteric, no proptosis   - Ear/Nose/Throat: NCAT, no visible inflammation or masses   - CARDIOVASCULAR: no cyanosis, no visible JVD   - RESPIRATORY: respiratory effort normal without any distress or labored breathing   - MUSCULOSKELETAL: Normal ROM of neck and upper extremities observed   - SKIN: No rash on face  - NEUROLOGIC:  No facial asymmetry (Cranial nerve 7 motor function), No gaze palsy   - PSYCHIATRIC: Normal affect, Normal insight and judgement       Data Reviewed:   Component      Latest Ref Rng & Units 12/31/2020 12/31/2020 12/31/2020 12/31/2020           7:46 AM  7:46 AM  7:46 AM  7:46 AM   Glucose      65 - 99 mg/dL    93   BUN      8 - 27 mg/dL    11   Creatinine      0.57 - 1.00 mg/dL    0.88   GFR est non-AA      >59 mL/min/1.73    70   GFR est AA      >59 mL/min/1.73    81   BUN/Creatinine ratio      12 - 28    13   Sodium      134 - 144 mmol/L    140   Potassium      3.5 - 5.2 mmol/L    4.0   Chloride      96 - 106 mmol/L    107 (H)   CO2      20 - 29 mmol/L    20   Calcium      8.7 - 10.3 mg/dL    8.8   Protein, total      6.0 - 8.5 g/dL    6.6   Albumin      3.8 - 4.8 g/dL    4.2   GLOBULIN, TOTAL      1.5 - 4.5 g/dL    2.4   A-G Ratio      1.2 - 2.2    1.8   Bilirubin, total      0.0 - 1.2 mg/dL    0.3   Alk.  phosphatase      39 - 117 IU/L    62   AST      0 - 40 IU/L    15   ALT      0 - 32 IU/L    13   Cholesterol, total      100 - 199 mg/dL   194    Triglyceride      0 - 149 mg/dL   94    HDL Cholesterol      >39 mg/dL   82    VLDL, calculated      5 - 40 mg/dL   17    LDL, calculated      0 - 99 mg/dL   95    TSH      0.450 - 4.500 uIU/mL  0.855     VITAMIN D, 25-HYDROXY      30.0 - 100.0 ng/mL 8.7 (L)          Assessment/Plan:       1. Hypothyroidism due to acquired atrophy of thyroid: She has a FH of hashimoto's disease and her TSH was mildly high at 5.19 in 8/12 after previously having normal values in Feb 2010-12. I repeated her TSH and it was 3.67 in 12/12 with a normal TPO ab but we decided to give a 3 month trial of levothyroxine 50 mcg daily and she felt much better and TSH was down to 1.63 in 4/13 and the same in 10/13 and 1.2 in 10/14. TSH up to 3.76 in 7/15 so increased her dose to 75 mcg daily and TSH down to 0.9 in 10/15 so kept her dose the same. TSH up to 2.68 in 10/16 so I increased her dose to 88 mcg daily and TSH 0.95 in 12/16. TSH down to 0.85 in 5/17 with Dr. Elida Bass so he decreased her dose to 6.5 tabs/week and TSH up to 2.65 in 7/17 so went back to 1 tab daily and TSH 2.76 in 12/16 and I increased her dose to 7.5 tabs/week and TSH down to 1.12 in 2/18. It's possible some of her fluctuation is from generic so gave a trial of brand synthroid to see if she feels better on this but she didn't so went back to generic levothyroxine at 7.5 tabs/week and TSH 0.773 in 1/19 so kept dose the same. TSH up to 1.68 in 8/19 so increased dose to 100 mcg daily and TSH 0.98 in 2/20 and 0.85 in 12/20 so kept dose the same. - cont levothyroxine 100 mcg daily  - check TSH and free T4 prior to next visit      2.  Vitamin D deficiency: level was 8.7 in 12/20 and started on 2000 of D3 daily but this won't be enough to get to goal > 30 so stopped and changed to ergo 50K units 2 caps weekly  - begin Ergocalciferol 50,000 units 2 caps weekly  - take OTC vitamin D 2000 units 3 caps daily until Ergo arrives  - check Vitamin D 25-OH level in 2 months and prior to next visit            Patient Instructions   1) Your vitamin D level is 8.7 which is very low. Goal is over 30. Please start once a week vitamin D called Ergocalciferol 50,000 units 2 caps together on Sundays. I sent this to [facility]. If taking 2 caps together causes any nausea or headaches, then you can split the dose to 1 cap twice a week. If you miss a dose of vitamin D, it's okay to take it as soon as you remember or just double up the next week. The goal is 8 caps per month. Until this arrives, plan on taking 3 of the 2000 units caps everyday and then switch to the weekly dose and stop the over the counter vitamin D.    2) Your TSH (thyroid test) is perfect so I will keep your dose the same of levothyroxine    3) I [facility] system to repeat your labs in 2 months and in the 1-2 weeks prior to your next visit so just ask for the order under my name and you will receive a courtesy reminder through [employer] to have these drawn. 4) Please come for a follow up visit on 2/25/22 at 8:50am in our [place] office. Follow-up and Dispositions    · Return 2/25/22 at 8:50am.               Copy sent to:  Dr. Rob Kent via [facility]  Dr. Xander Simpson [phone]    Lab follow up: 4/25/21  Component      Latest Ref Rng & Units 4/22/2021           8:00 AM   VITAMIN D, 25-HYDROXY      30.0 - 100.0 ng/mL 95.6     Sent her the following message through [employer]:  Your vitamin D level is 95 which is now back to normal and at the highest part of then normal range. Goal is over 30 and I really only need your level in the 30-50 range. You are now on more vitamin D than you need so to keep your level normal I recommend decreasing your dose to just 1 cap per week over the next year. I updated your med list but did not send a new prescription to your pharmacy on file that has been filling this med so you may want to take this off auto-refill if you have it this way to avoid stockpiling of this med.

## 2021-03-05 ENCOUNTER — HOSPITAL ENCOUNTER (OUTPATIENT)
Dept: MAMMOGRAPHY | Age: 66
Discharge: HOME | End: 2021-03-05
Attending: OBSTETRICS & GYNECOLOGY
Payer: COMMERCIAL

## 2021-03-05 DIAGNOSIS — Z12.31 VISIT FOR SCREENING MAMMOGRAM: ICD-10-CM

## 2021-03-05 PROCEDURE — 77067 SCR MAMMO BI INCL CAD: CPT

## 2021-04-14 RX ORDER — ESCITALOPRAM OXALATE 20 MG/1
20 TABLET ORAL DAILY
Qty: 90 TAB | Refills: 3 | Status: SHIPPED | OUTPATIENT
Start: 2021-04-14 | End: 2022-04-14 | Stop reason: SDUPTHER

## 2021-04-19 DIAGNOSIS — E55.9 VITAMIN D DEFICIENCY: ICD-10-CM

## 2021-04-23 LAB — 25(OH)D3+25(OH)D2 SERPL-MCNC: 95.6 NG/ML (ref 30–100)

## 2021-04-25 RX ORDER — ERGOCALCIFEROL 1.25 MG/1
CAPSULE ORAL
Qty: 26 CAP | Refills: 3
Start: 2021-04-25 | End: 2021-06-08 | Stop reason: SDUPTHER

## 2021-05-11 DIAGNOSIS — E78.01 FAMILIAL HYPERCHOLESTEROLEMIA: ICD-10-CM

## 2021-05-12 RX ORDER — SIMVASTATIN 20 MG/1
TABLET, FILM COATED ORAL
Qty: 90 TAB | Refills: 0 | Status: SHIPPED | OUTPATIENT
Start: 2021-05-12 | End: 2021-08-24 | Stop reason: SDUPTHER

## 2021-06-09 RX ORDER — ERGOCALCIFEROL 1.25 MG/1
CAPSULE ORAL
Qty: 13 CAPSULE | Refills: 3 | Status: SHIPPED | OUTPATIENT
Start: 2021-06-09 | End: 2021-08-24 | Stop reason: SDUPTHER

## 2021-08-24 DIAGNOSIS — E78.01 FAMILIAL HYPERCHOLESTEROLEMIA: ICD-10-CM

## 2021-08-24 RX ORDER — CETIRIZINE HCL 10 MG
10 TABLET ORAL
Qty: 90 TABLET | Refills: 3 | Status: SHIPPED | OUTPATIENT
Start: 2021-08-24

## 2021-08-24 RX ORDER — ERGOCALCIFEROL 1.25 MG/1
CAPSULE ORAL
Qty: 13 CAPSULE | Refills: 3 | Status: SHIPPED | OUTPATIENT
Start: 2021-08-24 | End: 2022-06-08 | Stop reason: SDUPTHER

## 2021-08-24 RX ORDER — LOSARTAN POTASSIUM 50 MG/1
50 TABLET ORAL DAILY
Qty: 90 TABLET | Refills: 3 | Status: SHIPPED | OUTPATIENT
Start: 2021-08-24 | End: 2022-03-23 | Stop reason: SDUPTHER

## 2021-08-24 RX ORDER — SIMVASTATIN 20 MG/1
TABLET, FILM COATED ORAL
Qty: 90 TABLET | Refills: 0 | Status: SHIPPED | OUTPATIENT
Start: 2021-08-24 | End: 2021-12-16 | Stop reason: SDUPTHER

## 2021-08-24 NOTE — TELEPHONE ENCOUNTER
Future Appointments:  Future Appointments   Date Time Provider Kaushal Worthington   1/20/2022  8:30 AM Joshua Florentino MD [facility] BS AMB   2/25/2022  8:50 AM Dav Peterson MD [facility] [address] [facility] AMB        Last Appointment With Me:  Visit date not found     Requested Prescriptions     Pending Prescriptions Disp Refills    cetirizine (ZyrTEC) 10 mg tablet 90 Tablet 3     Sig: Take 1 Tablet by mouth daily as needed for Allergies.  losartan (COZAAR) 50 mg tablet 90 Tablet 3     Sig: Take 1 Tablet by mouth daily.     simvastatin (ZOCOR) 20 mg tablet 90 Tablet 0     Sig: TAKE ONE TABLET BY MOUTH NIGHTLY

## 2021-12-04 DIAGNOSIS — F51.01 PRIMARY INSOMNIA: ICD-10-CM

## 2021-12-04 DIAGNOSIS — G25.81 RLS (RESTLESS LEGS SYNDROME): ICD-10-CM

## 2021-12-04 RX ORDER — ZOLPIDEM TARTRATE 10 MG/1
TABLET ORAL
Qty: 30 TABLET | Refills: 5 | Status: SHIPPED | OUTPATIENT
Start: 2021-12-04 | End: 2022-06-28

## 2021-12-04 RX ORDER — CLONAZEPAM 0.5 MG/1
TABLET ORAL
Qty: 30 TABLET | Refills: 5 | Status: SHIPPED | OUTPATIENT
Start: 2021-12-04 | End: 2022-06-28

## 2021-12-07 DIAGNOSIS — E55.9 VITAMIN D DEFICIENCY: ICD-10-CM

## 2021-12-07 DIAGNOSIS — Z00.00 ROUTINE GENERAL MEDICAL EXAMINATION AT A HEALTH CARE FACILITY: Primary | ICD-10-CM

## 2021-12-29 ENCOUNTER — TELEPHONE (OUTPATIENT)
Dept: INTERNAL MEDICINE CLINIC | Age: 66
End: 2021-12-29

## 2021-12-29 NOTE — TELEPHONE ENCOUNTER
Jules from [facility] calling to verify the dosage of the Lexopril script. States history of 10 and 20 mg and wants to confirm.     Please call at [phone]

## 2021-12-29 NOTE — TELEPHONE ENCOUNTER
Writer spoke with pharmacy at this time. Informed pharmacist that patient is currently taking 20 mg of Lexapro at this time. Pharmacist states that she would discontinue order for 10 mg at this time. No further actions required at this time.

## 2022-02-11 DIAGNOSIS — E55.9 VITAMIN D DEFICIENCY: ICD-10-CM

## 2022-02-11 DIAGNOSIS — E03.4 HYPOTHYROIDISM DUE TO ACQUIRED ATROPHY OF THYROID: ICD-10-CM

## 2022-02-14 ENCOUNTER — LAB ONLY (OUTPATIENT)
Dept: INTERNAL MEDICINE CLINIC | Age: 67
End: 2022-02-14

## 2022-02-14 DIAGNOSIS — Z00.00 ROUTINE GENERAL MEDICAL EXAMINATION AT A HEALTH CARE FACILITY: ICD-10-CM

## 2022-02-14 DIAGNOSIS — E55.9 VITAMIN D DEFICIENCY: ICD-10-CM

## 2022-02-14 LAB
25(OH)D3 SERPL-MCNC: 85.8 NG/ML (ref 30–100)
ALBUMIN SERPL-MCNC: 3.6 G/DL (ref 3.5–5)
ALBUMIN/GLOB SERPL: 1 {RATIO} (ref 1.1–2.2)
ALP SERPL-CCNC: 61 U/L (ref 45–117)
ALT SERPL-CCNC: 20 U/L (ref 12–78)
ANION GAP SERPL CALC-SCNC: 10 MMOL/L (ref 5–15)
AST SERPL-CCNC: 15 U/L (ref 15–37)
BILIRUB SERPL-MCNC: 0.3 MG/DL (ref 0.2–1)
BUN SERPL-MCNC: 11 MG/DL (ref 6–20)
BUN/CREAT SERPL: 12 (ref 12–20)
CALCIUM SERPL-MCNC: 9.3 MG/DL (ref 8.5–10.1)
CHLORIDE SERPL-SCNC: 107 MMOL/L (ref 97–108)
CHOLEST SERPL-MCNC: 217 MG/DL
CO2 SERPL-SCNC: 22 MMOL/L (ref 21–32)
CREAT SERPL-MCNC: 0.91 MG/DL (ref 0.55–1.02)
ERYTHROCYTE [DISTWIDTH] IN BLOOD BY AUTOMATED COUNT: 14 % (ref 11.5–14.5)
GLOBULIN SER CALC-MCNC: 3.6 G/DL (ref 2–4)
GLUCOSE SERPL-MCNC: 117 MG/DL (ref 65–100)
HCT VFR BLD AUTO: 42.2 % (ref 35–47)
HDLC SERPL-MCNC: 74 MG/DL
HDLC SERPL: 2.9 {RATIO} (ref 0–5)
HGB BLD-MCNC: 13.8 G/DL (ref 11.5–16)
LDLC SERPL CALC-MCNC: 67.6 MG/DL (ref 0–100)
MCH RBC QN AUTO: 31.9 PG (ref 26–34)
MCHC RBC AUTO-ENTMCNC: 32.7 G/DL (ref 30–36.5)
MCV RBC AUTO: 97.5 FL (ref 80–99)
NRBC # BLD: 0 K/UL (ref 0–0.01)
NRBC BLD-RTO: 0 PER 100 WBC
PLATELET # BLD AUTO: 257 K/UL (ref 150–400)
PMV BLD AUTO: 11.2 FL (ref 8.9–12.9)
POTASSIUM SERPL-SCNC: 3.7 MMOL/L (ref 3.5–5.1)
PROT SERPL-MCNC: 7.2 G/DL (ref 6.4–8.2)
RBC # BLD AUTO: 4.33 M/UL (ref 3.8–5.2)
SODIUM SERPL-SCNC: 139 MMOL/L (ref 136–145)
TRIGL SERPL-MCNC: 377 MG/DL (ref ?–150)
TSH SERPL DL<=0.05 MIU/L-ACNC: 1.54 UIU/ML (ref 0.36–3.74)
VLDLC SERPL CALC-MCNC: 75.4 MG/DL
WBC # BLD AUTO: 5.2 K/UL (ref 3.6–11)

## 2022-02-15 LAB
25(OH)D3+25(OH)D2 SERPL-MCNC: 41.3 NG/ML (ref 30–100)
T4 FREE SERPL-MCNC: 1.19 NG/DL (ref 0.82–1.77)
TSH SERPL DL<=0.005 MIU/L-ACNC: 1.83 UIU/ML (ref 0.45–4.5)

## 2022-02-22 ENCOUNTER — OFFICE VISIT (OUTPATIENT)
Dept: INTERNAL MEDICINE CLINIC | Age: 67
End: 2022-02-22
Payer: MEDICARE

## 2022-02-22 VITALS
SYSTOLIC BLOOD PRESSURE: 130 MMHG | HEIGHT: 63 IN | WEIGHT: 181.2 LBS | HEART RATE: 64 BPM | OXYGEN SATURATION: 98 % | RESPIRATION RATE: 16 BRPM | DIASTOLIC BLOOD PRESSURE: 80 MMHG | BODY MASS INDEX: 32.11 KG/M2 | TEMPERATURE: 98.2 F

## 2022-02-22 DIAGNOSIS — Z12.11 COLON CANCER SCREENING: ICD-10-CM

## 2022-02-22 DIAGNOSIS — G25.81 RLS (RESTLESS LEGS SYNDROME): ICD-10-CM

## 2022-02-22 DIAGNOSIS — Z78.0 MENOPAUSE: Primary | ICD-10-CM

## 2022-02-22 DIAGNOSIS — E03.9 HYPOTHYROIDISM, UNSPECIFIED TYPE: ICD-10-CM

## 2022-02-22 DIAGNOSIS — Z00.00 WELCOME TO MEDICARE PREVENTIVE VISIT: ICD-10-CM

## 2022-02-22 DIAGNOSIS — E78.00 PURE HYPERCHOLESTEROLEMIA: ICD-10-CM

## 2022-02-22 DIAGNOSIS — Z23 ENCOUNTER FOR IMMUNIZATION: ICD-10-CM

## 2022-02-22 DIAGNOSIS — I10 HYPERTENSION, UNSPECIFIED TYPE: ICD-10-CM

## 2022-02-22 PROCEDURE — 99213 OFFICE O/P EST LOW 20 MIN: CPT | Performed by: INTERNAL MEDICINE

## 2022-02-22 PROCEDURE — G8754 DIAS BP LESS 90: HCPCS | Performed by: INTERNAL MEDICINE

## 2022-02-22 PROCEDURE — 90732 PPSV23 VACC 2 YRS+ SUBQ/IM: CPT | Performed by: INTERNAL MEDICINE

## 2022-02-22 PROCEDURE — 1101F PT FALLS ASSESS-DOCD LE1/YR: CPT | Performed by: INTERNAL MEDICINE

## 2022-02-22 PROCEDURE — G8417 CALC BMI ABV UP PARAM F/U: HCPCS | Performed by: INTERNAL MEDICINE

## 2022-02-22 PROCEDURE — G8536 NO DOC ELDER MAL SCRN: HCPCS | Performed by: INTERNAL MEDICINE

## 2022-02-22 PROCEDURE — G8427 DOCREV CUR MEDS BY ELIG CLIN: HCPCS | Performed by: INTERNAL MEDICINE

## 2022-02-22 PROCEDURE — G8752 SYS BP LESS 140: HCPCS | Performed by: INTERNAL MEDICINE

## 2022-02-22 PROCEDURE — G8510 SCR DEP NEG, NO PLAN REQD: HCPCS | Performed by: INTERNAL MEDICINE

## 2022-02-22 PROCEDURE — G9899 SCRN MAM PERF RSLTS DOC: HCPCS | Performed by: INTERNAL MEDICINE

## 2022-02-22 PROCEDURE — 1090F PRES/ABSN URINE INCON ASSESS: CPT | Performed by: INTERNAL MEDICINE

## 2022-02-22 PROCEDURE — 93005 ELECTROCARDIOGRAM TRACING: CPT | Performed by: INTERNAL MEDICINE

## 2022-02-22 PROCEDURE — G0402 INITIAL PREVENTIVE EXAM: HCPCS | Performed by: INTERNAL MEDICINE

## 2022-02-22 PROCEDURE — G8400 PT W/DXA NO RESULTS DOC: HCPCS | Performed by: INTERNAL MEDICINE

## 2022-02-22 PROCEDURE — G0463 HOSPITAL OUTPT CLINIC VISIT: HCPCS | Performed by: INTERNAL MEDICINE

## 2022-02-22 PROCEDURE — 3017F COLORECTAL CA SCREEN DOC REV: CPT | Performed by: INTERNAL MEDICINE

## 2022-02-22 PROCEDURE — G0403 EKG FOR INITIAL PREVENT EXAM: HCPCS | Performed by: INTERNAL MEDICINE

## 2022-02-22 NOTE — PROGRESS NOTES
HISTORY OF PRESENT ILLNESS  Romeo Sandy is a 74 y.o. female. HPI      Here  fu htn hld hypothyroid insomnia and RLS and welcome to medicare--  Recent tsh 1.54 glucose 117  LDl 67 TG  377  Depression on lexapro-fair control    RLS on prn clonazepam prn helps  Due or pneumovax 23 and dexa--    Retired last year  walks 3 miles 3d per week but weight is up 20 lbs    Vision 20/100 with glasses-seees eye mD and reports vision test is wnl at eye MD office    Last OV  Had recent labs wnl except vit d 8.7  See Dr Elizabeth Lin for hypothroidism,  insommia-not sleeping well d/t loss of a recent friend, takes ambien prn but more frequently. Needs refill  RLS--clonazepam a few times per week  MDD-lexapro helps  Will retires next year and move to [place]    Patient Active Problem List    Diagnosis Date Noted    Vitamin D deficiency 02/19/2021    Dry eyes 09/18/2015    Hypothyroidism due to acquired atrophy of thyroid 12/31/2012    Essential hypertension, benign 08/18/2009    Pure hypercholesterolemia 08/18/2009    RLS (restless legs syndrome) 08/18/2009     Current Outpatient Medications   Medication Sig Dispense Refill    cetirizine (ZyrTEC) 10 mg tablet Take 1 Tablet by mouth daily as needed for Allergies. 90 Tablet 3    losartan (COZAAR) 50 mg tablet Take 1 Tablet by mouth daily. 90 Tablet 3    simvastatin (ZOCOR) 20 mg tablet TAKE ONE TABLET BY MOUTH NIGHTLY 90 Tablet 3    zolpidem (AMBIEN) 10 mg tablet TAKE ONE TABLET BY MOUTH ONCE NIGHTLY AS NEEDED FOR SLEEP 30 Tablet 5    clonazePAM (KlonoPIN) 0.5 mg tablet TAKE ONE TABLET BY MOUTH ONCE NIGHTLY AS NEEDED FOR RESTLESS LEG SYNDROME 30 Tablet 5    ergocalciferol (ERGOCALCIFEROL) 1,250 mcg (50,000 unit) capsule Take 1 cap once weekly for vitamin D deficiency 13 Capsule 3    escitalopram oxalate (LEXAPRO) 20 mg tablet Take 1 Tab by mouth daily.  90 Tab 3    levothyroxine (SYNTHROID) 100 mcg tablet TAKE 1 TABLET EVERY DAY BEFORE BREAKFAST 90 Tab 3    estradiol (ESTRACE) 0.5 mg tablet TAKE 1 TABLET BY MOUTH EVERY DAY  2    PV W-O OXANA/FERROUS FUMARATE/FA (M-VIT PO) Take 1 Tab by mouth daily. Allergies   Allergen Reactions    Bactrim [Sulfamethoprim] Nausea and Vomiting    Omnicef [Cefdinir] Nausea and Vomiting    Aspirin Other (comments)     abd pain and cramping    Clindamycin Other (comments)     Abdominal pain    Ibuprofen Other (comments)     abd pain and cramping    Lisinopril Cough    Nsaids (Non-Steroidal Anti-Inflammatory Drug) Other (comments)     Pain and burning    Pcn [Penicillins] Rash    Tetracycline Nausea and Vomiting      Lab Results   Component Value Date/Time    WBC 5.2 02/14/2022 10:21 AM    HGB 13.8 02/14/2022 10:21 AM    HCT 42.2 02/14/2022 10:21 AM    PLATELET 189 85/61/0298 10:21 AM    MCV 97.5 02/14/2022 10:21 AM     Lab Results   Component Value Date/Time    Glucose 117 (H) 02/14/2022 10:21 AM    LDL, calculated 67.6 02/14/2022 10:21 AM    Creatinine (POC) 0.9 12/05/2018 06:57 PM    Creatinine 0.91 02/14/2022 10:21 AM      Lab Results   Component Value Date/Time    Cholesterol, total 217 (H) 02/14/2022 10:21 AM    Cholesterol (POC) 191 02/28/2014 08:52 AM    HDL Cholesterol 74 02/14/2022 10:21 AM    LDL, calculated 67.6 02/14/2022 10:21 AM    LDL Cholesterol (POC) n/a 02/28/2014 08:52 AM    Triglyceride 377 (H) 02/14/2022 10:21 AM    Triglycerides (POC) <45 02/28/2014 08:52 AM    CHOL/HDL Ratio 2.9 02/14/2022 10:21 AM     Lab Results   Component Value Date/Time    ALT (SGPT) 20 02/14/2022 10:21 AM    Alk.  phosphatase 61 02/14/2022 10:21 AM    Bilirubin, total 0.3 02/14/2022 10:21 AM    Albumin 3.6 02/14/2022 10:21 AM    Protein, total 7.2 02/14/2022 10:21 AM    PLATELET 571 09/90/9038 10:21 AM     Lab Results   Component Value Date/Time    GFR est non-AA >60 02/14/2022 10:21 AM    GFRNA, POC >60 12/05/2018 06:57 PM    GFR est AA >60 02/14/2022 10:21 AM    GFRAA, POC >60 12/05/2018 06:57 PM    Creatinine 0.91 02/14/2022 10:21 AM Creatinine (POC) 0.9 12/05/2018 06:57 PM    BUN 11 02/14/2022 10:21 AM    Sodium 139 02/14/2022 10:21 AM    Potassium 3.7 02/14/2022 10:21 AM    Chloride 107 02/14/2022 10:21 AM    CO2 22 02/14/2022 10:21 AM     Lab Results   Component Value Date/Time    TSH 1.54 02/14/2022 10:21 AM    T4, Free 1.19 02/14/2022 09:54 AM      Lab Results   Component Value Date/Time    Glucose 117 (H) 02/14/2022 10:21 AM         Review of Systems   Constitutional: Negative for chills, fever, malaise/fatigue and weight loss. HENT: Negative. Eyes: Negative for blurred vision and double vision. Respiratory: Negative for cough and shortness of breath. Cardiovascular: Negative for chest pain and palpitations. Gastrointestinal: Negative for abdominal pain, blood in stool, constipation, diarrhea, melena, nausea and vomiting. Genitourinary: Negative for dysuria, frequency, hematuria and urgency. Musculoskeletal: Negative for back pain, falls, joint pain and myalgias. Skin: Negative. Neurological: Negative for dizziness, tremors and headaches. Physical Exam  Vitals and nursing note reviewed. Constitutional:       Appearance: Normal appearance. She is well-developed. Comments: Appears stated age   HENT:      Right Ear: Tympanic membrane normal.      Left Ear: Tympanic membrane normal.      Nose: Nose normal.   Cardiovascular:      Rate and Rhythm: Normal rate and regular rhythm. Pulses: Normal pulses. Heart sounds: Normal heart sounds. No murmur heard. No friction rub. No gallop. Pulmonary:      Effort: Pulmonary effort is normal. No respiratory distress. Breath sounds: Normal breath sounds. No wheezing. Abdominal:      General: Bowel sounds are normal.      Palpations: Abdomen is soft. Musculoskeletal:      Cervical back: Normal range of motion. Right lower leg: No edema. Skin:     General: Skin is warm. Neurological:      General: No focal deficit present.       Mental Status: She is alert. ASSESSMENT and PLAN  Diagnoses and all orders for this visit:    1. Menopause  -     DEXA BONE DENSITY STUDY AXIAL; Future    2. Colon cancer screening  -     COLOGUARD TEST (FECAL DNA COLORECTAL CANCER SCREENING)    3. Hypertension, unspecified type  -     AMB POC EKG ROUTINE W/ 12 LEADS, INTER & REP    4. Pure hypercholesterolemia    5. Hypothyroidism, unspecified type    6. RLS (restless legs syndrome)    7. Encounter for immunization  -     PNEUMOCOCCAL POLYSACCHARIDE VACCINE, 23-VALENT, ADULT OR IMMUNOSUPPRESSED PT DOSE,           This is a \"Welcome to [employer]"  Initial Preventive Physical Examination (IPPE) providing Personalized Prevention Plan Services (Performed in the first 12 months of enrollment)    I have reviewed the patient's medical history in detail and updated the computerized patient record. Assessment/Plan   Education and counseling provided:  Are appropriate based on today's review and evaluation  End-of-Life planning (with patient's consent)  Pneumococcal Vaccine  Screening Mammography  Colorectal cancer screening tests  Bone mass measurement (DEXA)    1. Menopause  -     DEXA BONE DENSITY STUDY AXIAL; Future  2. Colon cancer screening  -     COLOGUARD TEST (FECAL DNA COLORECTAL CANCER SCREENING)  3. Hypertension, unspecified type  -     AMB POC EKG ROUTINE W/ 12 LEADS, INTER & REP   controlled  4. Pure hypercholesterolemia   Mild TG elevation--low fat diet and exercise  5. Hypothyroidism, unspecified type-fu endocrine MD  6. RLS (restless legs syndrome)-controlled, refill medication when neede  7. Encounter for immunization  -     PNEUMOCOCCAL POLYSACCHARIDE VACCINE, 23-VALENT, ADULT OR IMMUNOSUPPRESSED PT DOSE,  8. MDD--controlled  9. Elevated glucose--fu a1c. Weight reduction needed-discussed  10 obesity   I have reviewed/discussed the above normal BMI with the patient.   I have recommended the following interventions: dietary management education, guidance, and counseling and encourage exercise . Manjit Gallardo Depression Risk Screen     3 most recent PHQ Screens 2/22/2022   Little interest or pleasure in doing things Several days   Feeling down, depressed, irritable, or hopeless Several days   Total Score PHQ 2 2       Alcohol & Drug Abuse Risk Screen    Do you average more than 1 drink per night or more than 7 drinks a week:  No    On any one occasion in the past three months have you have had more than 3 drinks containing alcohol:  No          Functional Ability and Level of Safety    Diet: No special diet      Hearing: Hearing is good. Vision Screening:  Vision is good. No exam data present--20/100 b/l      Activities of Daily Living: The home contains: no safety equipment. Patient does total self care      Ambulation: with no difficulty      Exercise level: moderately active     Fall Risk Screen:  Fall Risk Assessment, last 12 mths 2/22/2022   Able to walk? Yes   Fall in past 12 months? 0   Do you feel unsteady? 0   Are you worried about falling 0      Abuse Screen:  Patient is not abused       Screening EKG   EKG order placed: Yes    End of Life Planning   Advanced care planning directives were discussed with the patient and /or family/caregiver.      Health Maintenance Due     Health Maintenance Due   Topic Date Due    Shingrix Vaccine Age 49> (2 of 2) 03/26/2020    Colorectal Cancer Screening Combo  11/09/2020    Depression Monitoring  01/07/2022    Bone Densitometry (Dexa) Screening  Never done    Pneumococcal 65+ years (1 of 1 - PPSV23) Never done    Medicare Yearly Exam  02/14/2022       Patient Care Team   Patient Care Team:  Damon Lagunas MD as PCP - Stanley Monson MD as PCP - [facility] Empaneled Provider  Otis Villasenor MD as Consulting Provider (Endocrinology)  Simon Steven MD (Obstetrics & Gynecology)    History     Past Medical History:   Diagnosis Date    Helicobacter pylori ab+     treated    Hypercholesteremia     Hypertension     Obesity surgery status 2009    s/p lap band Dr. Viola Motta RLS (restless legs syndrome)     Unspecified hypothyroidism 12/31/2012    Vitamin D deficiency 2/19/2021      Past Surgical History:   Procedure Laterality Date    HX CARPAL TUNNEL RELEASE      b/l    HX CATARACT REMOVAL Bilateral June and July 2014    HX CERVICAL DISKECTOMY      c5/c6 2006    HX GYN      tubal ligation    HX GYN  Jan 2014    pelvic organ prolapse surgery    HX HEENT      tonsillectomy    HX ORTHOPAEDIC      x2 trigger fingers right hand    HX OTHER SURGICAL  4/2008    lap band    HX PARTIAL HYSTERECTOMY      NC LAP, CHANGE ADJUST KAYLEE BAND       Current Outpatient Medications   Medication Sig Dispense Refill    simvastatin (ZOCOR) 20 mg tablet TAKE ONE TABLET BY MOUTH NIGHTLY 90 Tablet 3    zolpidem (AMBIEN) 10 mg tablet TAKE ONE TABLET BY MOUTH ONCE NIGHTLY AS NEEDED FOR SLEEP 30 Tablet 5    clonazePAM (KlonoPIN) 0.5 mg tablet TAKE ONE TABLET BY MOUTH ONCE NIGHTLY AS NEEDED FOR RESTLESS LEG SYNDROME 30 Tablet 5    ergocalciferol (ERGOCALCIFEROL) 1,250 mcg (50,000 unit) capsule Take 1 cap once weekly for vitamin D deficiency 13 Capsule 3    cetirizine (ZyrTEC) 10 mg tablet Take 1 Tablet by mouth daily as needed for Allergies. 90 Tablet 3    losartan (COZAAR) 50 mg tablet Take 1 Tablet by mouth daily. 90 Tablet 3    escitalopram oxalate (LEXAPRO) 20 mg tablet Take 1 Tab by mouth daily. 90 Tab 3    levothyroxine (SYNTHROID) 100 mcg tablet TAKE 1 TABLET EVERY DAY BEFORE BREAKFAST 90 Tab 3    estradiol (ESTRACE) 0.5 mg tablet TAKE 1 TABLET BY MOUTH EVERY DAY  2    PV W-O OXANA/FERROUS FUMARATE/FA (M-VIT PO) Take 1 Tab by mouth daily.        Allergies   Allergen Reactions    Bactrim [Sulfamethoprim] Nausea and Vomiting    Omnicef [Cefdinir] Nausea and Vomiting    Aspirin Other (comments)     abd pain and cramping    Clindamycin Other (comments)     Abdominal pain    Ibuprofen Other (comments)     abd pain and cramping  Lisinopril Cough    Nsaids (Non-Steroidal Anti-Inflammatory Drug) Other (comments)     Pain and burning    Pcn [Penicillins] Rash    Tetracycline Nausea and Vomiting       Family History   Problem Relation Age of Onset    Thyroid Disease Mother         hashimoto's    Thyroid Disease Daughter         hashimoto's    Diabetes Father         type 2    Heart Disease Father 64        multiple MIs     Social History     Tobacco Use    Smoking status: Former Smoker     Packs/day: 1.00     Years: 2.00     Pack years: 2.00     Quit date: 1978     Years since quittin.1    Smokeless tobacco: Never Used   Substance Use Topics    Alcohol use: Yes     Comment: 4 drinks weekly       Zeeshan Snider MD

## 2022-02-22 NOTE — PROGRESS NOTES
Omar Salvador is a 74 y.o. female who presents for routine immunizations. She denies any symptoms , reactions or allergies that would exclude them from being immunized today. Risks and adverse reactions were discussed and the VIS was given to them. All questions were addressed. She was observed for 10 min post injection. There were no reactions observed.     Grisel Bhagat LPN

## 2022-02-22 NOTE — PATIENT INSTRUCTIONS
Writer discussed with patient about an Advanced Medical Directive. Provided patient blank Advanced Medical Directive and 'Your Right to Decide' Booklet. Writer reviewed Advanced Medical Directive paperwork with patient with a brief description of how to complete the form. Requested that if document completed, to please provide a copy of completed Advanced Medical Directive to PCP office. Patient verbalized understanding. Office Policies    Phone calls/patient messages:            Please allow up to 24 hours for someone in the office to contact you about your call or message. Be mindful your provider may be out of the office or your message may require further review. We encourage you to use [employer] for your messages as this is a faster, more efficient way to communicate with our office                         Medication Refills:            Prescription medications require 48-72 business hours to process. We encourage you to use [employer] for your refills. For controlled medications: Please allow 72 business hours to process. Certain medications may require you to  a written prescription at our office. NO narcotic/controlled medications will be prescribed after 4pm Monday through Friday or on weekends              Form/Paperwork Completion:            Please note a $25 fee may incur for all paperwork for completed by our providers. We ask that you allow 7-10 business days. Pre-payment is due prior to picking up/faxing the completed form. You may also download your forms to [employer] to have your doctor print off. Medicare Wellness Visit, Female     The best way to live healthy is to have a lifestyle where you eat a well-balanced diet, exercise regularly, limit alcohol use, and quit all forms of tobacco/nicotine, if applicable. Regular preventive services are another way to keep healthy.  Preventive services (vaccines, screening tests, monitoring & exams) can help personalize your care plan, which helps you manage your own care. Screening tests can find health problems at the earliest stages, when they are easiest to treat. [facility] follows the current, evidence-based guidelines published by the [facility] ([facility]) when recommending preventive services for our patients. Because we follow these guidelines, sometimes recommendations change over time as research supports it. (For example, mammograms used to be recommended annually. Even though Medicare will still pay for an annual mammogram, the newer guidelines recommend a mammogram every two years for women of average risk). Of course, you and your doctor may decide to screen more often for some diseases, based on your risk and your co-morbidities (chronic disease you are already diagnosed with). Preventive services for you include:  - Medicare offers their members a free annual wellness visit, which is time for you and your primary care provider to discuss and plan for your preventive service needs. Take advantage of this benefit every year!  -All adults over the age of 72 should receive the recommended pneumonia vaccines. Current USPSTF guidelines recommend a series of two vaccines for the best pneumonia protection.   -All adults should have a flu vaccine yearly and a tetanus vaccine every 10 years.   -All adults age 48 and older should receive the shingles vaccines (series of two vaccines).       -All adults age 38-68 who are overweight should have a diabetes screening test once every three years.   -All adults born between [birth year] and 1963 should be screened once for Hepatitis C.  -Other screening tests and preventive services for persons with diabetes include: an eye exam to screen for diabetic retinopathy, a kidney function test, a foot exam, and stricter control over your cholesterol.   -Cardiovascular screening for adults with routine risk involves an electrocardiogram (ECG) at intervals determined by your doctor.   -Colorectal cancer screenings should be done for adults age 54-65 with no increased risk factors for colorectal cancer. There are a number of acceptable methods of screening for this type of cancer. Each test has its own benefits and drawbacks. Discuss with your doctor what is most appropriate for you during your annual wellness visit. The different tests include: colonoscopy (considered the best screening method), a fecal occult blood test, a fecal DNA test, and sigmoidoscopy.    -A bone mass density test is recommended when a woman turns 65 to screen for osteoporosis. This test is only recommended one time, as a screening. Some providers will use this same test as a disease monitoring tool if you already have osteoporosis. -Breast cancer screenings are recommended every other year for women of normal risk, age 54-69.  -Cervical cancer screenings for women over age 72 are only recommended with certain risk factors.      Here is a list of your current Health Maintenance items (your personalized list of preventive services) with a due date:  Health Maintenance Due   Topic Date Due    Shingles Vaccine (2 of 2) 03/26/2020    Colorectal Screening  11/09/2020    Depression Monitoring  01/07/2022    Bone Mineral Density   Never done

## 2022-02-25 ENCOUNTER — OFFICE VISIT (OUTPATIENT)
Dept: ENDOCRINOLOGY | Age: 67
End: 2022-02-25
Payer: MEDICARE

## 2022-02-25 VITALS
BODY MASS INDEX: 32.43 KG/M2 | DIASTOLIC BLOOD PRESSURE: 70 MMHG | SYSTOLIC BLOOD PRESSURE: 116 MMHG | HEART RATE: 68 BPM | WEIGHT: 183 LBS | HEIGHT: 63 IN

## 2022-02-25 DIAGNOSIS — E55.9 VITAMIN D DEFICIENCY: ICD-10-CM

## 2022-02-25 DIAGNOSIS — E03.4 HYPOTHYROIDISM DUE TO ACQUIRED ATROPHY OF THYROID: Primary | ICD-10-CM

## 2022-02-25 PROCEDURE — G8400 PT W/DXA NO RESULTS DOC: HCPCS | Performed by: INTERNAL MEDICINE

## 2022-02-25 PROCEDURE — 1090F PRES/ABSN URINE INCON ASSESS: CPT | Performed by: INTERNAL MEDICINE

## 2022-02-25 PROCEDURE — 3017F COLORECTAL CA SCREEN DOC REV: CPT | Performed by: INTERNAL MEDICINE

## 2022-02-25 PROCEDURE — G8432 DEP SCR NOT DOC, RNG: HCPCS | Performed by: INTERNAL MEDICINE

## 2022-02-25 PROCEDURE — 1101F PT FALLS ASSESS-DOCD LE1/YR: CPT | Performed by: INTERNAL MEDICINE

## 2022-02-25 PROCEDURE — G8752 SYS BP LESS 140: HCPCS | Performed by: INTERNAL MEDICINE

## 2022-02-25 PROCEDURE — G8427 DOCREV CUR MEDS BY ELIG CLIN: HCPCS | Performed by: INTERNAL MEDICINE

## 2022-02-25 PROCEDURE — G8754 DIAS BP LESS 90: HCPCS | Performed by: INTERNAL MEDICINE

## 2022-02-25 PROCEDURE — G8417 CALC BMI ABV UP PARAM F/U: HCPCS | Performed by: INTERNAL MEDICINE

## 2022-02-25 PROCEDURE — 99214 OFFICE O/P EST MOD 30 MIN: CPT | Performed by: INTERNAL MEDICINE

## 2022-02-25 PROCEDURE — G8536 NO DOC ELDER MAL SCRN: HCPCS | Performed by: INTERNAL MEDICINE

## 2022-02-25 PROCEDURE — G9899 SCRN MAM PERF RSLTS DOC: HCPCS | Performed by: INTERNAL MEDICINE

## 2022-02-25 RX ORDER — LEVOTHYROXINE SODIUM 100 UG/1
TABLET ORAL
Qty: 90 TABLET | Refills: 3 | Status: SHIPPED | OUTPATIENT
Start: 2022-02-25

## 2022-02-25 NOTE — PATIENT INSTRUCTIONS
1) TSH is a thyroid test.  Your level is 1.8 (1.5 on Dr. Lavern Spurling lab) which is normal and at goal of 0.45-2.0. This test goes opposite of your thyroid dose and suggests your dose of levothyroxine is perfect so I will keep your dose the same and recommend you have your levels checked once a year to ensure the dose is stable. 2) Your vitamin D level is 41 which is normal on my lab and I wonder if Dr. Lavern Spurling value of 85 was falsely high as this was drawn at the [facility] and the other 3 tests over the past year have been at [facility].  Goal is over 30. Please continue to take your current dose of vitamin D to keep your levels at goal as I'm concerned about dropping your dose to 1 cap every other week may cause your level to go under 30. Still have him follow this once a year. 3) Going forward I will have you just follow up with your PCP and if anything worsens in the future, I'm happy to see you back. Feel free to send me a message through [phone] or call me at [phone] if you have any questions or concerns in the future. I sent a year of refills to [facility] but in the future, I will have Dr. Jose Bernabe take this prescription over.

## 2022-02-25 NOTE — PROGRESS NOTES
Chief Complaint   Patient presents with    Thyroid Problem     pcp and pharmacy confirmed     History of Present Illness: Ayala Matthews is a 74 y.o. female here for follow up of thyroid. No major change to health since last visit in 2/21. Has been taking weekly vitamin D every Mondays. Had labs for Dr. Earle Rodriguez drawn on the same day as labs for me but [facility] and her vitamin D was much higher at 80 on Dr. Ria Sharma lab than it was on my lab at [facility] and all other specimens that we have been following were drawn at [facility] so I told her that I will trust my lab over Dr. Ria Sharma. Has been taking levothyroxine 100 mcg every morning at least 30 min before and coffee. Hasn't missed any doses. Bowels are mostly regular. No heat or cold intolerance. Some dry skin but no worse. No hair loss. Does have some brittle nails. Retired on 12/31/21. Her energy and joint pains are better since skilled nursing. Willing to follow with Dr. Earle Rodriguez going forward. Current Outpatient Medications   Medication Sig    simvastatin (ZOCOR) 20 mg tablet TAKE ONE TABLET BY MOUTH NIGHTLY    zolpidem (AMBIEN) 10 mg tablet TAKE ONE TABLET BY MOUTH ONCE NIGHTLY AS NEEDED FOR SLEEP    clonazePAM (KlonoPIN) 0.5 mg tablet TAKE ONE TABLET BY MOUTH ONCE NIGHTLY AS NEEDED FOR RESTLESS LEG SYNDROME    ergocalciferol (ERGOCALCIFEROL) 1,250 mcg (50,000 unit) capsule Take 1 cap once weekly for vitamin D deficiency    cetirizine (ZyrTEC) 10 mg tablet Take 1 Tablet by mouth daily as needed for Allergies.  losartan (COZAAR) 50 mg tablet Take 1 Tablet by mouth daily.  escitalopram oxalate (LEXAPRO) 20 mg tablet Take 1 Tab by mouth daily.  levothyroxine (SYNTHROID) 100 mcg tablet TAKE 1 TABLET EVERY DAY BEFORE BREAKFAST    estradiol (ESTRACE) 0.5 mg tablet TAKE 1 TABLET BY MOUTH EVERY DAY    PV W-O OXANA/FERROUS FUMARATE/FA (M-VIT PO) Take 1 Tab by mouth daily. No current facility-administered medications for this visit. Allergies   Allergen Reactions    Bactrim [Sulfamethoprim] Nausea and Vomiting    Omnicef [Cefdinir] Nausea and Vomiting    Aspirin Other (comments)     abd pain and cramping    Clindamycin Other (comments)     Abdominal pain    Ibuprofen Other (comments)     abd pain and cramping    Lisinopril Cough    Nsaids (Non-Steroidal Anti-Inflammatory Drug) Other (comments)     Pain and burning    Pcn [Penicillins] Rash    Tetracycline Nausea and Vomiting     Review of Systems: PER HPI    Physical Examination:  Blood pressure 116/70, pulse 68, height 5' 3\" (1.6 m), weight 183 lb (83 kg). - General: pleasant, no distress, good eye contact   - Neck: small goiter  - Cardiovascular: regular, normal rate, nl s1 and s2, no m/r/g   - Respiratory: clear to auscultation bilaterally   - Integumentary: skin is normal, no edema  - Neurological: reflexes 2+ at biceps, no tremors  - Psychiatric: normal mood and affect    Data Reviewed:   Component      Latest Ref Rng & Units 2/14/2022 2/14/2022 2/14/2022           9:54 AM  9:54 AM  9:54 AM   T4, Free      0.82 - 1.77 ng/dL   1.19   TSH      0.450 - 4.500 uIU/mL  1.830    VITAMIN D, 25-HYDROXY      30.0 - 100.0 ng/mL 41.3         Assessment/Plan:     1. Hypothyroidism due to acquired atrophy of thyroid: She has a FH of hashimoto's disease and her TSH was mildly high at 5.19 in 8/12 after previously having normal values in Feb 2010-12. I repeated her TSH and it was 3.67 in 12/12 with a normal TPO ab but we decided to give a 3 month trial of levothyroxine 50 mcg daily and she felt much better and TSH was down to 1.63 in 4/13 and the same in 10/13 and 1.2 in 10/14. TSH up to 3.76 in 7/15 so increased her dose to 75 mcg daily and TSH down to 0.9 in 10/15 so kept her dose the same. TSH up to 2.68 in 10/16 so I increased her dose to 88 mcg daily and TSH 0.95 in 12/16.   TSH down to 0.85 in 5/17 with Dr. Clovis Harris so he decreased her dose to 6.5 tabs/week and TSH up to 2.65 in 7/17 so went back to 1 tab daily and TSH 2.76 in 12/16 and I increased her dose to 7.5 tabs/week and TSH down to 1.12 in 2/18. It's possible some of her fluctuation is from generic so gave a trial of brand synthroid to see if she feels better on this but she didn't so went back to generic levothyroxine at 7.5 tabs/week and TSH 0.773 in 1/19 so kept dose the same. TSH up to 1.68 in 8/19 so increased dose to 100 mcg daily and TSH 0.98 in 2/20 and 0.85 in 12/20 and 1.83 in 2/22 (1.5 on Dr. Llody Faust lab) so kept dose the same. - cont levothyroxine 100 mcg daily  - check TSH and free T4 annually        2. Vitamin D deficiency: level was 8.7 in 12/20 and started on 2000 of D3 daily but this won't be enough to get to goal > 30 so stopped and changed to ergo 50K units 2 caps weekly and level up to 95 in 4/21 so decreased to 1 cap weekly and level 41 in 2/22 (85 on Dr. Lloyd Faust lab sent to [facility] but feel this was falsely high) so kept dose the same. - cont Ergocalciferol 50,000 units 1 cap weekly  - check Vitamin D 25-OH level annually          Patient Instructions   1) TSH is a thyroid test.  Your level is 1.8 (1.5 on Dr. Lloyd Faust lab) which is normal and at goal of 0.45-2.0. This test goes opposite of your thyroid dose and suggests your dose of levothyroxine is perfect so I will keep your dose the same and recommend you have your levels checked once a year to ensure the dose is stable. 2) Your vitamin D level is 41 which is normal on my lab and I wonder if Dr. Lloyd Faust value of 85 was falsely high as this was drawn at the [address] [place] [address] and the other 3 tests over the past year have been at [facility].  Goal is over 30. Please continue to take your current dose of vitamin D to keep your levels at goal as I'm concerned about dropping your dose to 1 cap every other week may cause your level to go under 30. Still have him follow this once a year.     3) Going forward I will have you just follow up with your PCP and if anything worsens in the future, I'm happy to see you back. Feel free to send me a message through [phone] or call me at [phone] if you have any questions or concerns in the future. I sent a year of refills to [facility] but in the future, I will have Dr. Campbell Orozco take this prescription over. Follow-up and Dispositions    · Return if symptoms worsen or fail to improve.                Copy sent to:  Dr. Soumya Kiran via [facility]  Dr. Melida Doss [phone]

## 2022-03-01 ENCOUNTER — TRANSCRIBE ORDER (OUTPATIENT)
Dept: SCHEDULING | Age: 67
End: 2022-03-01

## 2022-03-01 DIAGNOSIS — Z12.31 SCREENING MAMMOGRAM FOR HIGH-RISK PATIENT: Primary | ICD-10-CM

## 2022-03-13 ENCOUNTER — DOCUMENTATION ONLY (OUTPATIENT)
Dept: INTERNAL MEDICINE CLINIC | Age: 67
End: 2022-03-13

## 2022-03-18 DIAGNOSIS — E78.01 FAMILIAL HYPERCHOLESTEROLEMIA: ICD-10-CM

## 2022-03-18 RX ORDER — SIMVASTATIN 20 MG/1
TABLET, FILM COATED ORAL
Qty: 90 TABLET | Refills: 3 | Status: SHIPPED | OUTPATIENT
Start: 2022-03-18

## 2022-03-19 PROBLEM — E55.9 VITAMIN D DEFICIENCY: Status: ACTIVE | Noted: 2021-02-19

## 2022-03-23 RX ORDER — LOSARTAN POTASSIUM 50 MG/1
50 TABLET ORAL DAILY
Qty: 90 TABLET | Refills: 3 | Status: SHIPPED | OUTPATIENT
Start: 2022-03-23

## 2022-03-23 NOTE — TELEPHONE ENCOUNTER
Future Appointments:  Future Appointments   Date Time Provider Kaushal Elin   4/25/2022  8:30 AM [facility] KEYON 1 [facility] REG   4/25/2022  9:00 AM [facility] DEXA 1 [facility] REG   2/23/2023  9:00 AM Lisa Pope MD [facility] BS AMB        Last Appointment With Me:  2/22/2022     Requested Prescriptions     Pending Prescriptions Disp Refills    losartan (COZAAR) 50 mg tablet 90 Tablet 3     Sig: Take 1 Tablet by mouth daily.

## 2022-04-14 RX ORDER — ESCITALOPRAM OXALATE 20 MG/1
20 TABLET ORAL DAILY
Qty: 90 TABLET | Refills: 3 | Status: SHIPPED | OUTPATIENT
Start: 2022-04-14

## 2022-04-14 NOTE — TELEPHONE ENCOUNTER
PCP: Ekaterina Horta MD    Last appt: 2/22/2022  Future Appointments   Date Time Provider Kaushal Worthington   4/25/2022  8:30 AM [facility] KEYON 1 [facility] REG   4/25/2022  9:00 AM [facility] DEXA 1 [facility] REG   2/23/2023  9:00 AM Ekaterina Horta MD [facility] BS AMB       Requested Prescriptions     Pending Prescriptions Disp Refills    escitalopram oxalate (LEXAPRO) 20 mg tablet 90 Tablet 3     Sig: Take 1 Tablet by mouth daily.        Prior labs and Blood pressures:  BP Readings from Last 3 Encounters:   02/25/22 116/70   02/22/22 130/80   01/07/21 110/60     Lab Results   Component Value Date/Time    Sodium 139 02/14/2022 10:21 AM    Potassium 3.7 02/14/2022 10:21 AM    Chloride 107 02/14/2022 10:21 AM    CO2 22 02/14/2022 10:21 AM    Anion gap 10 02/14/2022 10:21 AM    Glucose 117 (H) 02/14/2022 10:21 AM    BUN 11 02/14/2022 10:21 AM    Creatinine 0.91 02/14/2022 10:21 AM    BUN/Creatinine ratio 12 02/14/2022 10:21 AM    GFR est AA >60 02/14/2022 10:21 AM    GFR est non-AA >60 02/14/2022 10:21 AM    Calcium 9.3 02/14/2022 10:21 AM     No results found for: HBA1C, CEU9NISH, VGR2KRBB  Lab Results   Component Value Date/Time    Cholesterol, total 217 (H) 02/14/2022 10:21 AM    Cholesterol (POC) 191 02/28/2014 08:52 AM    HDL Cholesterol 74 02/14/2022 10:21 AM    HDL Cholesterol (POC) 85 02/28/2014 08:52 AM    LDL Cholesterol (POC) n/a 02/28/2014 08:52 AM    LDL, calculated 67.6 02/14/2022 10:21 AM    VLDL, calculated 75.4 02/14/2022 10:21 AM    Triglyceride 377 (H) 02/14/2022 10:21 AM    Triglycerides (POC) <45 02/28/2014 08:52 AM    CHOL/HDL Ratio 2.9 02/14/2022 10:21 AM     Lab Results   Component Value Date/Time    Vitamin D 25-Hydroxy 85.8 02/14/2022 10:21 AM    VITAMIN D, 25-HYDROXY 41.3 02/14/2022 09:54 AM       Lab Results   Component Value Date/Time    TSH 1.54 02/14/2022 10:21 AM

## 2022-06-08 RX ORDER — ERGOCALCIFEROL 1.25 MG/1
CAPSULE ORAL
Qty: 13 CAPSULE | Refills: 3 | Status: SHIPPED | OUTPATIENT
Start: 2022-06-08

## 2022-06-09 ENCOUNTER — HOSPITAL ENCOUNTER (OUTPATIENT)
Dept: MAMMOGRAPHY | Age: 67
Discharge: HOME | End: 2022-06-09
Attending: OBSTETRICS & GYNECOLOGY

## 2022-06-09 DIAGNOSIS — Z12.31 SCREENING MAMMOGRAM FOR HIGH-RISK PATIENT: ICD-10-CM

## 2022-06-27 DIAGNOSIS — F51.01 PRIMARY INSOMNIA: ICD-10-CM

## 2022-06-27 DIAGNOSIS — G25.81 RLS (RESTLESS LEGS SYNDROME): ICD-10-CM

## 2022-06-28 RX ORDER — CLONAZEPAM 0.5 MG/1
TABLET ORAL
Qty: 30 TABLET | Refills: 5 | Status: SHIPPED | OUTPATIENT
Start: 2022-06-28

## 2022-06-28 RX ORDER — ZOLPIDEM TARTRATE 10 MG/1
TABLET ORAL
Qty: 30 TABLET | Refills: 5 | Status: SHIPPED | OUTPATIENT
Start: 2022-06-28

## 2022-09-27 ENCOUNTER — HOSPITAL ENCOUNTER (OUTPATIENT)
Dept: MAMMOGRAPHY | Age: 67
Discharge: HOME | End: 2022-09-27
Attending: OBSTETRICS & GYNECOLOGY
Payer: MEDICARE

## 2022-09-27 ENCOUNTER — HOSPITAL ENCOUNTER (OUTPATIENT)
Dept: MAMMOGRAPHY | Age: 67
Discharge: HOME | End: 2022-09-27
Attending: INTERNAL MEDICINE
Payer: MEDICARE

## 2022-09-27 DIAGNOSIS — Z78.0 MENOPAUSE: ICD-10-CM

## 2022-09-27 PROCEDURE — 77067 SCR MAMMO BI INCL CAD: CPT

## 2022-09-27 PROCEDURE — 77080 DXA BONE DENSITY AXIAL: CPT

## 2023-01-24 DIAGNOSIS — F51.01 PRIMARY INSOMNIA: ICD-10-CM

## 2023-01-24 DIAGNOSIS — G25.81 RLS (RESTLESS LEGS SYNDROME): ICD-10-CM

## 2023-01-25 DIAGNOSIS — E78.5 HYPERLIPIDEMIA, UNSPECIFIED HYPERLIPIDEMIA TYPE: ICD-10-CM

## 2023-01-25 DIAGNOSIS — E55.9 VITAMIN D DEFICIENCY: ICD-10-CM

## 2023-01-25 DIAGNOSIS — I10 HYPERTENSION, UNSPECIFIED TYPE: Primary | ICD-10-CM

## 2023-01-25 RX ORDER — CLONAZEPAM 0.5 MG/1
TABLET ORAL
Qty: 30 TABLET | Refills: 5 | Status: SHIPPED | OUTPATIENT
Start: 2023-01-25

## 2023-01-25 RX ORDER — ZOLPIDEM TARTRATE 10 MG/1
TABLET ORAL
Qty: 30 TABLET | Refills: 5 | Status: SHIPPED | OUTPATIENT
Start: 2023-01-25

## 2023-02-23 ENCOUNTER — OFFICE VISIT (OUTPATIENT)
Dept: INTERNAL MEDICINE CLINIC | Age: 68
End: 2023-02-23
Payer: MEDICARE

## 2023-02-23 VITALS
WEIGHT: 184.8 LBS | DIASTOLIC BLOOD PRESSURE: 84 MMHG | SYSTOLIC BLOOD PRESSURE: 148 MMHG | RESPIRATION RATE: 18 BRPM | BODY MASS INDEX: 32.74 KG/M2 | OXYGEN SATURATION: 96 % | TEMPERATURE: 97 F | HEART RATE: 69 BPM | HEIGHT: 63 IN

## 2023-02-23 DIAGNOSIS — G25.81 RLS (RESTLESS LEGS SYNDROME): ICD-10-CM

## 2023-02-23 DIAGNOSIS — Z00.00 MEDICARE ANNUAL WELLNESS VISIT, SUBSEQUENT: ICD-10-CM

## 2023-02-23 DIAGNOSIS — E78.5 HYPERLIPIDEMIA, UNSPECIFIED HYPERLIPIDEMIA TYPE: ICD-10-CM

## 2023-02-23 DIAGNOSIS — E03.9 ACQUIRED HYPOTHYROIDISM: ICD-10-CM

## 2023-02-23 DIAGNOSIS — F51.01 PRIMARY INSOMNIA: ICD-10-CM

## 2023-02-23 DIAGNOSIS — Z82.49 FH: CAD (CORONARY ARTERY DISEASE): Primary | ICD-10-CM

## 2023-02-23 DIAGNOSIS — I10 HYPERTENSION, UNSPECIFIED TYPE: ICD-10-CM

## 2023-02-23 PROCEDURE — G8399 PT W/DXA RESULTS DOCUMENT: HCPCS | Performed by: INTERNAL MEDICINE

## 2023-02-23 PROCEDURE — G8417 CALC BMI ABV UP PARAM F/U: HCPCS | Performed by: INTERNAL MEDICINE

## 2023-02-23 PROCEDURE — 1090F PRES/ABSN URINE INCON ASSESS: CPT | Performed by: INTERNAL MEDICINE

## 2023-02-23 PROCEDURE — G0439 PPPS, SUBSEQ VISIT: HCPCS | Performed by: INTERNAL MEDICINE

## 2023-02-23 PROCEDURE — G8536 NO DOC ELDER MAL SCRN: HCPCS | Performed by: INTERNAL MEDICINE

## 2023-02-23 PROCEDURE — 99213 OFFICE O/P EST LOW 20 MIN: CPT | Performed by: INTERNAL MEDICINE

## 2023-02-23 PROCEDURE — 1101F PT FALLS ASSESS-DOCD LE1/YR: CPT | Performed by: INTERNAL MEDICINE

## 2023-02-23 PROCEDURE — 3017F COLORECTAL CA SCREEN DOC REV: CPT | Performed by: INTERNAL MEDICINE

## 2023-02-23 PROCEDURE — G8427 DOCREV CUR MEDS BY ELIG CLIN: HCPCS | Performed by: INTERNAL MEDICINE

## 2023-02-23 PROCEDURE — G0463 HOSPITAL OUTPT CLINIC VISIT: HCPCS | Performed by: INTERNAL MEDICINE

## 2023-02-23 PROCEDURE — G8510 SCR DEP NEG, NO PLAN REQD: HCPCS | Performed by: INTERNAL MEDICINE

## 2023-02-23 PROCEDURE — G9899 SCRN MAM PERF RSLTS DOC: HCPCS | Performed by: INTERNAL MEDICINE

## 2023-02-23 RX ORDER — LOSARTAN POTASSIUM 100 MG/1
100 TABLET ORAL DAILY
Qty: 90 TABLET | Refills: 3 | Status: SHIPPED | OUTPATIENT
Start: 2023-02-23

## 2023-02-23 NOTE — PROGRESS NOTES
HISTORY OF PRESENT ILLNESS  Tammy Garcia is a 79 y.o. female. HPI  Here  fu htn hld hypothyroid insomnia , osteopenia with low FRAX and RLS and medicare wellness---  Gyn MD-Dr Filemon Chu  Depression symptoms-controlled without medication --stopped lexapro last year  Recent labs -   HDL 99  Home bp is variable at home but usually < 140/90  Vit d level 63-on ergo q 2weeks    Last OV  Recent tsh 1.54 glucose 117  LDl 67 TG  377  Depression on lexapro-fair control     RLS on prn clonazepam prn helps  Due or pneumovax 23 and dexa--    Retired last year  walks 3 miles 3d per week but weight is up 20 lbs     Vision 20/100 with glasses-seees eye mD and reports vision test is wnl at eye MD office       Patient Active Problem List    Diagnosis Date Noted    Vitamin D deficiency 02/19/2021    Dry eyes 09/18/2015    Hypothyroidism due to acquired atrophy of thyroid 12/31/2012    Essential hypertension, benign 08/18/2009    Pure hypercholesterolemia 08/18/2009    RLS (restless legs syndrome) 08/18/2009     Current Outpatient Medications   Medication Sig Dispense Refill    zolpidem (AMBIEN) 10 mg tablet TAKE ONE TABLET BY MOUTH EVERY NIGHT AT BEDTIME AS NEEDED FOR SLEEP 30 Tablet 5    clonazePAM (KlonoPIN) 0.5 mg tablet TAKE ONE TABLET BY MOUTH EVERY NIGHT AT BEDTIME FOR RESTLESS LEG SYNDROME 30 Tablet 5    ergocalciferol (ERGOCALCIFEROL) 1,250 mcg (50,000 unit) capsule Take 1 cap once weekly for vitamin D deficiency 13 Capsule 3    escitalopram oxalate (LEXAPRO) 20 mg tablet Take 1 Tablet by mouth daily. 90 Tablet 3    losartan (COZAAR) 50 mg tablet Take 1 Tablet by mouth daily. 90 Tablet 3    simvastatin (ZOCOR) 20 mg tablet TAKE ONE TABLET BY MOUTH NIGHTLY 90 Tablet 3    levothyroxine (SYNTHROID) 100 mcg tablet TAKE 1 TABLET EVERY DAY BEFORE BREAKFAST 90 Tablet 3    cetirizine (ZyrTEC) 10 mg tablet Take 1 Tablet by mouth daily as needed for Allergies.  90 Tablet 3    estradiol (ESTRACE) 0.5 mg tablet TAKE 1 TABLET BY MOUTH EVERY DAY  2    PV W-O OXANA/FERROUS FUMARATE/FA (M-VIT PO) Take 1 Tab by mouth daily.        Allergies   Allergen Reactions    Bactrim [Sulfamethoprim] Nausea and Vomiting    Omnicef [Cefdinir] Nausea and Vomiting    Aspirin Other (comments)     abd pain and cramping    Clindamycin Other (comments)     Abdominal pain    Ibuprofen Other (comments)     abd pain and cramping    Lisinopril Cough    Nsaids (Non-Steroidal Anti-Inflammatory Drug) Other (comments)     Pain and burning    Pcn [Penicillins] Rash    Tetracycline Nausea and Vomiting      Lab Results   Component Value Date/Time    WBC 4.7 02/14/2023 08:21 AM    HGB 13.5 02/14/2023 08:21 AM    HCT 39.7 02/14/2023 08:21 AM    PLATELET 787 07/14/0849 08:21 AM    MCV 96 02/14/2023 08:21 AM     Lab Results   Component Value Date/Time    Glucose 92 02/14/2023 08:21 AM    LDL, calculated 118 (H) 02/14/2023 08:21 AM    LDL, calculated 67.6 02/14/2022 10:21 AM    Creatinine (POC) 0.9 12/05/2018 06:57 PM    Creatinine 0.77 02/14/2023 08:21 AM      Lab Results   Component Value Date/Time    Cholesterol, total 233 (H) 02/14/2023 08:21 AM    Cholesterol (POC) 191 02/28/2014 08:52 AM    HDL Cholesterol 99 02/14/2023 08:21 AM    LDL, calculated 118 (H) 02/14/2023 08:21 AM    LDL, calculated 67.6 02/14/2022 10:21 AM    LDL Cholesterol (POC) n/a 02/28/2014 08:52 AM    Triglyceride 93 02/14/2023 08:21 AM    Triglycerides (POC) <45 02/28/2014 08:52 AM    CHOL/HDL Ratio 2.9 02/14/2022 10:21 AM     Lab Results   Component Value Date/Time    GFR est non-AA >60 02/14/2022 10:21 AM    GFRNA, POC >60 12/05/2018 06:57 PM    GFR est AA >60 02/14/2022 10:21 AM    GFRAA, POC >60 12/05/2018 06:57 PM    Creatinine 0.77 02/14/2023 08:21 AM    Creatinine (POC) 0.9 12/05/2018 06:57 PM    BUN 11 02/14/2023 08:21 AM    Sodium 141 02/14/2023 08:21 AM    Potassium 4.4 02/14/2023 08:21 AM    Chloride 105 02/14/2023 08:21 AM    CO2 23 02/14/2023 08:21 AM     Lab Results   Component Value Date/Time TSH 1.480 02/14/2023 08:21 AM    T4, Free 1.19 02/14/2022 09:54 AM      Lab Results   Component Value Date/Time    Glucose 92 02/14/2023 08:21 AM         ROS    Physical Exam  Vitals and nursing note reviewed. Constitutional:       Appearance: She is well-developed. HENT:      Head: Normocephalic and atraumatic. Right Ear: Tympanic membrane normal.      Left Ear: Tympanic membrane normal.   Eyes:      Pupils: Pupils are equal, round, and reactive to light. Neck:      Vascular: No carotid bruit. Cardiovascular:      Rate and Rhythm: Normal rate and regular rhythm. Pulses: Normal pulses. Heart sounds: Normal heart sounds. No murmur heard. No friction rub. No gallop. Pulmonary:      Effort: Pulmonary effort is normal. No respiratory distress. Breath sounds: Normal breath sounds. No wheezing or rales. Abdominal:      Palpations: Abdomen is soft. Musculoskeletal:      Cervical back: Normal range of motion and neck supple. Right lower leg: No edema. Left lower leg: No edema. Lymphadenopathy:      Cervical: No cervical adenopathy. Skin:     General: Skin is warm. Neurological:      General: No focal deficit present. Mental Status: She is alert. Psychiatric:         Mood and Affect: Mood normal.         Behavior: Behavior normal.         Thought Content: Thought content normal.         Judgment: Judgment normal.       ASSESSMENT and PLAN    ICD-10-CM ICD-9-CM    1. FH: CAD (coronary artery disease)  Z82.49 V17.3 CT HEART W/O CONT WITH CALCIUM      2. Hyperlipidemia, unspecified hyperlipidemia type  E78.5 272.4       3. RLS (restless legs syndrome)  G25.81 333.94       4. Acquired hypothyroidism  E03.9 244.9       5.  Primary insomnia  F51.01 307.42       This is the Subsequent Medicare Annual Wellness Exam, performed 12 months or more after the Initial AWV or the last Subsequent AWV    I have reviewed the patient's medical history in detail and updated the computerized patient record. Assessment/Plan   Education and counseling provided:  Are appropriate based on today's review and evaluation  End-of-Life planning (with patient's consent)    1. FH: CAD (coronary artery disease)  -     CT HEART W/O CONT WITH CALCIUM; Future  2. Hyperlipidemia, unspecified hyperlipidemia type   LDL close goal-continue medication statin   Fu CT Heart scan  3. RLS (restless legs syndrome)-refill clonazepam when needed  4. Acquired hypothyroidism-euthroid  5. Primary insomnia-ambien prn  6  HTN--elevated SBP--increase the losartan to 100mg every day . Low sodium diet. Nurse OV in 1 month  Depression Risk Factor Screening     3 most recent PHQ Screens 2/23/2023   Little interest or pleasure in doing things Not at all   Feeling down, depressed, irritable, or hopeless Not at all   Total Score PHQ 2 0       Alcohol & Drug Abuse Risk Screen    Do you average more than 1 drink per night or more than 7 drinks a week:  No    On any one occasion in the past three months have you have had more than 3 drinks containing alcohol:  No          Functional Ability and Level of Safety    Hearing: Hearing is good. Activities of Daily Living: The home contains: handrails  Patient does total self care      Ambulation: with no difficulty     Fall Risk:  Fall Risk Assessment, last 12 mths 2/23/2023   Able to walk? Yes   Fall in past 12 months? 0   Do you feel unsteady?  0   Are you worried about falling 0      Abuse Screen:  Patient is not abused       Cognitive Screening    Has your family/caregiver stated any concerns about your memory: no     Cognitive Screening: Normal - serial 3    Health Maintenance Due     Health Maintenance Due   Topic Date Due    Shingles Vaccine (2 of 2) 03/26/2020    DTaP/Tdap/Td series (2 - Td or Tdap) 04/30/2022    Depression Screen  02/22/2023    Medicare Yearly Exam  02/23/2023       Patient Care Team   Patient Care Team:  Willie Watt MD as PCP - General Guille Arriaza MD ANTHONY as PCP - [facility]  Leydi Harrington MD as Consulting Provider (Endocrinology Physician)  Janice Covarrubias MD (Obstetrics & Gynecology)    History     Patient Active Problem List   Diagnosis Code    Essential hypertension, benign I10    Pure hypercholesterolemia E78.00    RLS (restless legs syndrome) G25.81    Hypothyroidism due to acquired atrophy of thyroid E03.4    Dry eyes H04.123    Vitamin D deficiency E55.9     Past Medical History:   Diagnosis Date    Helicobacter pylori ab+     treated    Hypercholesteremia     Hypertension     Obesity surgery status 2009    s/p lap band Dr. Nicola Tolbert    RLS (restless legs syndrome)     Unspecified hypothyroidism 12/31/2012    Vitamin D deficiency 2/19/2021      Past Surgical History:   Procedure Laterality Date    HX CARPAL TUNNEL RELEASE      b/l    HX CATARACT REMOVAL Bilateral June and July 2014    HX CERVICAL DISKECTOMY      c5/c6 2006    HX GYN      tubal ligation    HX GYN  Jan 2014    pelvic organ prolapse surgery    HX HEENT      tonsillectomy    HX ORTHOPAEDIC      x2 trigger fingers right hand    HX OTHER SURGICAL  4/2008    lap band    HX PARTIAL HYSTERECTOMY      [place] LAPS GASTRIC RESTRICTIVE PX REMOVE&RPLCMT DEVICE       Current Outpatient Medications   Medication Sig Dispense Refill    zolpidem (AMBIEN) 10 mg tablet TAKE ONE TABLET BY MOUTH EVERY NIGHT AT BEDTIME AS NEEDED FOR SLEEP 30 Tablet 5    clonazePAM (KlonoPIN) 0.5 mg tablet TAKE ONE TABLET BY MOUTH EVERY NIGHT AT BEDTIME FOR RESTLESS LEG SYNDROME 30 Tablet 5    ergocalciferol (ERGOCALCIFEROL) 1,250 mcg (50,000 unit) capsule Take 1 cap once weekly for vitamin D deficiency 13 Capsule 3    losartan (COZAAR) 50 mg tablet Take 1 Tablet by mouth daily.  90 Tablet 3    simvastatin (ZOCOR) 20 mg tablet TAKE ONE TABLET BY MOUTH NIGHTLY 90 Tablet 3    levothyroxine (SYNTHROID) 100 mcg tablet TAKE 1 TABLET EVERY DAY BEFORE BREAKFAST 90 Tablet 3    estradiol (ESTRACE) 0.5 mg tablet TAKE 1 TABLET BY MOUTH EVERY DAY  2    PV W-O OXANA/FERROUS FUMARATE/FA (M-VIT PO) Take 1 Tab by mouth daily.        Allergies   Allergen Reactions    Bactrim [Sulfamethoprim] Nausea and Vomiting    Omnicef [Cefdinir] Nausea and Vomiting    Aspirin Other (comments)     abd pain and cramping    Clindamycin Other (comments)     Abdominal pain    Ibuprofen Other (comments)     abd pain and cramping    Lisinopril Cough    Nsaids (Non-Steroidal Anti-Inflammatory Drug) Other (comments)     Pain and burning    Pcn [Penicillins] Rash    Tetracycline Nausea and Vomiting       Family History   Problem Relation Age of Onset    Thyroid Disease Mother         hashimoto's    Thyroid Disease Daughter         hashimoto's    Diabetes Father         type 2    Heart Disease Father 64        multiple MIs     Social History     Tobacco Use    Smoking status: Former     Packs/day: 1.00     Years: 2.00     Pack years: 2.00     Types: Cigarettes     Quit date: 1978     Years since quittin.1    Smokeless tobacco: Never   Substance Use Topics    Alcohol use: Yes     Comment: 4 drinks weekly         Adair Becker MD

## 2023-02-23 NOTE — PATIENT INSTRUCTIONS
Medicare Wellness Visit, Female     The best way to live healthy is to have a lifestyle where you eat a well-balanced diet, exercise regularly, limit alcohol use, and quit all forms of tobacco/nicotine, if applicable. Regular preventive services are another way to keep healthy. Preventive services (vaccines, screening tests, monitoring & exams) can help personalize your care plan, which helps you manage your own care. Screening tests can find health problems at the earliest stages, when they are easiest to treat. [facility] follows the current, evidence-based guidelines published by the [facility] Kelvin Correa ([facility]) when recommending preventive services for our patients. Because we follow these guidelines, sometimes recommendations change over time as research supports it. (For example, mammograms used to be recommended annually. Even though Medicare will still pay for an annual mammogram, the newer guidelines recommend a mammogram every two years for women of average risk). Of course, you and your doctor may decide to screen more often for some diseases, based on your risk and your co-morbidities (chronic disease you are already diagnosed with). Preventive services for you include:  - Medicare offers their members a free annual wellness visit, which is time for you and your primary care provider to discuss and plan for your preventive service needs.  Take advantage of this benefit every year!    -Over the age of 72 should receive the recommended pneumonia vaccines.    -All adults should have a flu vaccine yearly.  -All adults should have a tetanus vaccine every 10 years.   -Over the age 48 should receive the shingles vaccines.        -All adults should be screened once for Hepatitis C.  -All adults age 38-68 who are overweight should have a diabetes screening test once every three years.   -Other screening tests and preventive services for persons with diabetes include: an eye exam to screen for diabetic retinopathy, a kidney function test, a foot exam, and stricter control over your cholesterol.   -Cardiovascular screening for adults with routine risk involves an electrocardiogram (ECG) at intervals determined by your doctor.     -Colorectal cancer screenings should be done for adults age 39-70 with no increased risk factors for colorectal cancer. There are a number of acceptable methods of screening for this type of cancer. Each test has its own benefits and drawbacks. Discuss with your doctor what is most appropriate for you during your annual wellness visit. The different tests include: colonoscopy (considered the best screening method), a fecal occult blood test, a fecal DNA test, and sigmoidoscopy.    -Lung cancer screening is recommended annually with a low dose CT scan for adults between age 54 and 68, who have smoked at least 30 pack years (equivalent of 1 pack per day for 30 days), and who is a current smoker or quit less than 15 years ago.    -A bone mass density test is recommended when a woman turns 65 to screen for osteoporosis. This test is only recommended one time, as a screening. Some providers will use this same test as a disease monitoring tool if you already have osteoporosis. -Breast cancer screenings are recommended every other year for women of normal risk, age 54-69.    -Cervical cancer screenings for women over age 72 are only recommended with certain risk factors.      Here is a list of your current Health Maintenance items (your personalized list of preventive services) with a due date:  Health Maintenance Due   Topic Date Due    Shingles Vaccine (2 of 2) 03/26/2020    DTaP/Tdap/Td  (2 - Td or Tdap) 04/30/2022    Depresssion Screening  02/22/2023

## 2023-02-23 NOTE — PROGRESS NOTES
1. Have you been to the ER, urgent care clinic since your last visit? Hospitalized since your last visit? No    2. Have you seen or consulted any other health care providers outside of the [address] [place] [address] since your last visit? Include any pap smears or colon screening.        See Dr. Leyla Rene - states mammogram was done fall 2022

## 2023-03-01 ENCOUNTER — HOSPITAL ENCOUNTER (EMERGENCY)
Age: 68
Discharge: HOME | End: 2023-03-01
Attending: EMERGENCY MEDICINE
Payer: MEDICARE

## 2023-03-01 ENCOUNTER — APPOINTMENT (OUTPATIENT)
Dept: CT IMAGING | Age: 68
End: 2023-03-01
Attending: PHYSICIAN ASSISTANT
Payer: MEDICARE

## 2023-03-01 VITALS
DIASTOLIC BLOOD PRESSURE: 70 MMHG | OXYGEN SATURATION: 95 % | BODY MASS INDEX: 32.74 KG/M2 | HEART RATE: 67 BPM | HEIGHT: 63 IN | TEMPERATURE: 98.2 F | SYSTOLIC BLOOD PRESSURE: 142 MMHG | RESPIRATION RATE: 16 BRPM

## 2023-03-01 DIAGNOSIS — R93.89 ABNORMAL FINDING ON IMAGING: ICD-10-CM

## 2023-03-01 DIAGNOSIS — K52.9 ENTERITIS: Primary | ICD-10-CM

## 2023-03-01 DIAGNOSIS — E86.0 DEHYDRATION: ICD-10-CM

## 2023-03-01 LAB
ALBUMIN SERPL-MCNC: 3.2 G/DL (ref 3.5–5)
ALBUMIN/GLOB SERPL: 0.9 (ref 1.1–2.2)
ALP SERPL-CCNC: 46 U/L (ref 45–117)
ALT SERPL-CCNC: 16 U/L (ref 12–78)
ANION GAP SERPL CALC-SCNC: 6 MMOL/L (ref 5–15)
APPEARANCE UR: CLEAR
AST SERPL-CCNC: 8 U/L (ref 15–37)
BASOPHILS # BLD: 0 K/UL (ref 0–0.1)
BASOPHILS NFR BLD: 0 % (ref 0–1)
BILIRUB SERPL-MCNC: 0.4 MG/DL (ref 0.2–1)
BILIRUB UR QL: NEGATIVE
BUN SERPL-MCNC: 8 MG/DL (ref 6–20)
BUN/CREAT SERPL: 13 (ref 12–20)
CALCIUM SERPL-MCNC: 9.2 MG/DL (ref 8.5–10.1)
CHLORIDE SERPL-SCNC: 105 MMOL/L (ref 97–108)
CO2 SERPL-SCNC: 24 MMOL/L (ref 21–32)
COLOR UR: ABNORMAL
CREAT SERPL-MCNC: 0.6 MG/DL (ref 0.55–1.02)
DIFFERENTIAL METHOD BLD: ABNORMAL
EOSINOPHIL # BLD: 0 K/UL (ref 0–0.4)
EOSINOPHIL NFR BLD: 0 % (ref 0–7)
ERYTHROCYTE [DISTWIDTH] IN BLOOD BY AUTOMATED COUNT: 14.1 % (ref 11.5–14.5)
GLOBULIN SER CALC-MCNC: 3.7 G/DL (ref 2–4)
GLUCOSE SERPL-MCNC: 111 MG/DL (ref 65–100)
GLUCOSE UR STRIP.AUTO-MCNC: NEGATIVE MG/DL
HCT VFR BLD AUTO: 40.8 % (ref 35–47)
HGB BLD-MCNC: 13.6 G/DL (ref 11.5–16)
HGB UR QL STRIP: NEGATIVE
IMM GRANULOCYTES # BLD AUTO: 0.1 K/UL (ref 0–0.04)
IMM GRANULOCYTES NFR BLD AUTO: 1 % (ref 0–0.5)
KETONES UR QL STRIP.AUTO: 80 MG/DL
LEUKOCYTE ESTERASE UR QL STRIP.AUTO: NEGATIVE
LIPASE SERPL-CCNC: 70 U/L (ref 73–393)
LYMPHOCYTES # BLD: 0.6 K/UL (ref 0.8–3.5)
LYMPHOCYTES NFR BLD: 8 % (ref 12–49)
MCH RBC QN AUTO: 31.9 PG (ref 26–34)
MCHC RBC AUTO-ENTMCNC: 33.3 G/DL (ref 30–36.5)
MCV RBC AUTO: 95.8 FL (ref 80–99)
MONOCYTES # BLD: 0.3 K/UL (ref 0–1)
MONOCYTES NFR BLD: 4 % (ref 5–13)
NEUTS SEG # BLD: 6.8 K/UL (ref 1.8–8)
NEUTS SEG NFR BLD: 87 % (ref 32–75)
NITRITE UR QL STRIP.AUTO: NEGATIVE
NRBC # BLD: 0 K/UL (ref 0–0.01)
NRBC BLD-RTO: 0 PER 100 WBC
PH UR STRIP: 6 (ref 5–8)
PLATELET # BLD AUTO: 220 K/UL (ref 150–400)
PMV BLD AUTO: 10.5 FL (ref 8.9–12.9)
POTASSIUM SERPL-SCNC: 3.6 MMOL/L (ref 3.5–5.1)
PROT SERPL-MCNC: 6.9 G/DL (ref 6.4–8.2)
PROT UR STRIP-MCNC: NEGATIVE MG/DL
RBC # BLD AUTO: 4.26 M/UL (ref 3.8–5.2)
RBC MORPH BLD: ABNORMAL
SODIUM SERPL-SCNC: 135 MMOL/L (ref 136–145)
SP GR UR REFRACTOMETRY: 1.01
UR CULT HOLD, URHOLD: NORMAL
UROBILINOGEN UR QL STRIP.AUTO: 0.2 EU/DL (ref 0.2–1)
WBC # BLD AUTO: 7.8 K/UL (ref 3.6–11)

## 2023-03-01 PROCEDURE — 83690 ASSAY OF LIPASE: CPT

## 2023-03-01 PROCEDURE — 85025 COMPLETE CBC W/AUTO DIFF WBC: CPT

## 2023-03-01 PROCEDURE — 74177 CT ABD & PELVIS W/CONTRAST: CPT

## 2023-03-01 PROCEDURE — 96375 TX/PRO/DX INJ NEW DRUG ADDON: CPT

## 2023-03-01 PROCEDURE — 36415 COLL VENOUS BLD VENIPUNCTURE: CPT

## 2023-03-01 PROCEDURE — 74011250637 HC RX REV CODE- 250/637: Performed by: PHYSICIAN ASSISTANT

## 2023-03-01 PROCEDURE — 96361 HYDRATE IV INFUSION ADD-ON: CPT

## 2023-03-01 PROCEDURE — 81003 URINALYSIS AUTO W/O SCOPE: CPT

## 2023-03-01 PROCEDURE — 80053 COMPREHEN METABOLIC PANEL: CPT

## 2023-03-01 PROCEDURE — 96374 THER/PROPH/DIAG INJ IV PUSH: CPT

## 2023-03-01 PROCEDURE — 74011250636 HC RX REV CODE- 250/636: Performed by: PHYSICIAN ASSISTANT

## 2023-03-01 PROCEDURE — 99285 EMERGENCY DEPT VISIT HI MDM: CPT

## 2023-03-01 PROCEDURE — 74011000636 HC RX REV CODE- 636: Performed by: EMERGENCY MEDICINE

## 2023-03-01 RX ORDER — ONDANSETRON 4 MG/1
4 TABLET, FILM COATED ORAL
Qty: 20 TABLET | Refills: 0 | Status: SHIPPED | OUTPATIENT
Start: 2023-03-01

## 2023-03-01 RX ORDER — DICYCLOMINE HYDROCHLORIDE 10 MG/1
10 CAPSULE ORAL
Status: COMPLETED | OUTPATIENT
Start: 2023-03-01 | End: 2023-03-01

## 2023-03-01 RX ORDER — ONDANSETRON 2 MG/ML
4 INJECTION INTRAMUSCULAR; INTRAVENOUS ONCE
Status: COMPLETED | OUTPATIENT
Start: 2023-03-01 | End: 2023-03-01

## 2023-03-01 RX ORDER — DICYCLOMINE HYDROCHLORIDE 10 MG/5ML
20 SOLUTION ORAL EVERY 6 HOURS
Qty: 200 ML | Refills: 0 | Status: SHIPPED | OUTPATIENT
Start: 2023-03-01 | End: 2023-03-06

## 2023-03-01 RX ORDER — DICYCLOMINE HYDROCHLORIDE 10 MG/ML
20 INJECTION INTRAMUSCULAR
Status: DISCONTINUED | OUTPATIENT
Start: 2023-03-01 | End: 2023-03-01

## 2023-03-01 RX ORDER — DICYCLOMINE HYDROCHLORIDE 10 MG/5ML
20 SOLUTION ORAL EVERY 6 HOURS
Qty: 200 ML | Refills: 0 | Status: SHIPPED | OUTPATIENT
Start: 2023-03-01 | End: 2023-03-01 | Stop reason: SDUPTHER

## 2023-03-01 RX ORDER — MORPHINE SULFATE 2 MG/ML
4 INJECTION, SOLUTION INTRAMUSCULAR; INTRAVENOUS ONCE
Status: COMPLETED | OUTPATIENT
Start: 2023-03-01 | End: 2023-03-01

## 2023-03-01 RX ORDER — CIPROFLOXACIN 500 MG/1
500 TABLET ORAL 2 TIMES DAILY
Qty: 14 TABLET | Refills: 0 | Status: SHIPPED | OUTPATIENT
Start: 2023-03-01 | End: 2023-03-08

## 2023-03-01 RX ORDER — ONDANSETRON 4 MG/1
4 TABLET, FILM COATED ORAL
Qty: 20 TABLET | Refills: 0 | Status: SHIPPED | OUTPATIENT
Start: 2023-03-01 | End: 2023-03-01 | Stop reason: SDUPTHER

## 2023-03-01 RX ADMIN — SODIUM CHLORIDE 1000 ML: 9 INJECTION, SOLUTION INTRAVENOUS at 13:37

## 2023-03-01 RX ADMIN — DICYCLOMINE HYDROCHLORIDE 10 MG: 10 CAPSULE ORAL at 14:35

## 2023-03-01 RX ADMIN — ONDANSETRON 4 MG: 2 INJECTION INTRAMUSCULAR; INTRAVENOUS at 13:21

## 2023-03-01 RX ADMIN — IOPAMIDOL 100 ML: 755 INJECTION, SOLUTION INTRAVENOUS at 14:45

## 2023-03-01 RX ADMIN — MORPHINE SULFATE 4 MG: 2 INJECTION, SOLUTION INTRAMUSCULAR; INTRAVENOUS at 13:33

## 2023-03-01 NOTE — ED NOTES
DC papers reviewed and in hand, pt verbalized understanding. Ambulatory out of ED with steady gait, no acute distress noted.

## 2023-03-01 NOTE — DISCHARGE INSTRUCTIONS
CT ABD PELV W CONT (Final result)  Result time 03/01/23 15:03:49  Final result by Snow Mazariegos MD (03/01/23 15:03:49)                Impression:    1. There are loops of relatively patulous small bowel in the distal   jejunum/ileum with air-fluid levels and associated amos appearance of the   mesentery. Findings are nonspecific and suggests ileus/enteritis. 2. Incidental findings as above. Narrative:    EXAM:  CT ABDOMEN PELVIS WITH CONTRAST   INDICATION:  generalized abdominal pain, N/V, x 3 days. Additional history:   COMPARISON: CT of the abdomen and pelvis, 12/5/2018. Kilo Ogren TECHNIQUE:   Multislice helical CT was performed from the diaphragm to the symphysis pubis   with oral and intravenous contrast administration. Contiguous 5 mm axial images   were reconstructed and lung and soft tissue windows were generated. Coronal and   sagittal reformations were generated. CT dose reduction was achieved through use of a standardized protocol tailored   for this examination and automatic exposure control for dose modulation. Kilo Ogren FINDINGS:   INCIDENTALLY IMAGED CHEST:   Heart/vessels: Minimal mitral annulus calcification. Lungs/Pleura: Within normal limits. .   ABDOMEN:   Liver: Diffuse, diminished attenuation throughout the liver relative to the   spleen suggesting hepatic steatosis. Gallbladder/Biliary: Within normal limits. Spleen: Within normal limits. Pancreas: Within normal limits. Adrenals: Within normal limits. Kidneys: Within normal limits. Peritoneum/Mesenteries: Trace amount of fluid in the pelvis. This appearance of   the central mesentery associated with distal jejunal/ileal loops   Extraperitoneum: Within normal limits. Gastrointestinal tract: Patulous distal esophagus containing an air-fluid level   with a lap band present. There are loops of distal jejunum/ileum which appear   relatively distended with air-fluid levels. Normal-appearing appendix. Vascular: Calcifications in the aorta. Paul Mercado PELVIS:   Extraperitoneum: Calculi in the pelvis suggesting phleboliths. Ureters: Within normal limits. Bladder: Within normal limits. Reproductive System: There is a cystic lesion left side of the pelvis, likely   within the left ovary measuring 3.7 x 3.4 cm. .   MSK:   Degenerative changes in the lower lumbar spine with degenerative disc disease at   L3/L4 and vacuum disc phenomenon at L2/L3. There is minimal anterior listhesis   of L3 on L4.  Tiny, fat-containing umbilical hernia

## 2023-03-01 NOTE — ED PROVIDER NOTES
\[facility]\"" EMERGENCY DEPT  EMERGENCY DEPARTMENT ENCOUNTER       Pt Name: Adeel Bolanos  MRN: [id number]  [place] 1955  Date of evaluation: 3/1/2023  Provider: TEJA Salcido   PCP: Jethro Valencia MD  Note Started: 12:59 PM 3/1/23     CHIEF COMPLAINT       Chief Complaint   Patient presents with    Abdominal Pain     Mid upper abdominal pain with nausea and poor PO intake since Monday. Denies urinary sx, fever, chills         HISTORY OF PRESENT ILLNESS: 1 or more elements      History From: Patient  HPI Limitations : None     Adeel Bolanos is a 79 y.o. female who presents BIB self with CC of generalized abdominal pain x3 days. The patient states \"I have had enteritis in the past and this feels similar. \"  She describes the pain as crampy, felt in the middle of her abdomen, with associated dry heaving. The patient reports that she has not vomited, though she has not eaten anything in 3 days due to these dry heaves. She denies diarrhea , constipation, blood in stool, dysuria, urinary frequency or urgency, hematuria, chest pain, shortness of breath, fever. The patient states \"I can tell and not obstructed. \"  Past surgical history of lap band, tubal ligation. No recent antibiotic use. No known sick contacts. Nursing Notes were all reviewed and agreed with or any disagreements were addressed in the HPI. REVIEW OF SYSTEMS      Review of Systems   Constitutional:  Negative for chills and fever. Respiratory:  Negative for shortness of breath. Cardiovascular:  Negative for chest pain. Gastrointestinal:  Positive for abdominal pain and nausea. Negative for blood in stool, constipation and diarrhea. Genitourinary:  Negative for difficulty urinating and dysuria. Hematological:  Does not bruise/bleed easily. Positives and Pertinent negatives as per HPI.     PAST HISTORY     Past Medical History:  Past Medical History:   Diagnosis Date    Helicobacter pylori ab+     treated    Hypercholesteremia Hypertension     Obesity surgery status     s/p lap band Dr. Jeffrey Dad    RLS (restless legs syndrome)     Unspecified hypothyroidism 2012    Vitamin D deficiency 2021       Past Surgical History:  Past Surgical History:   Procedure Laterality Date    HX CARPAL TUNNEL RELEASE      b/l    HX CATARACT REMOVAL Bilateral  and 2014    HX CERVICAL DISKECTOMY      c5/c6     HX GYN      tubal ligation    HX GYN  2014    pelvic organ prolapse surgery    HX HEENT      tonsillectomy    HX ORTHOPAEDIC      x2 trigger fingers right hand    HX OTHER SURGICAL  2008    lap band    HX PARTIAL HYSTERECTOMY      FL LAPS GASTRIC RESTRICTIVE PX REMOVE&RPLCMT DEVICE         Family History:  Family History   Problem Relation Age of Onset    Thyroid Disease Mother         hashimoto's    Thyroid Disease Daughter         hashimoto's    Diabetes Father         type 2    Heart Disease Father 64        multiple MIs       Social History:  Social History     Tobacco Use    Smoking status: Former     Packs/day: 1.00     Years: 2.00     Pack years: 2.00     Types: Cigarettes     Quit date: 1978     Years since quittin.1    Smokeless tobacco: Never   Substance Use Topics    Alcohol use: Yes     Comment: 4 drinks weekly    Drug use: Yes     Types: Prescription, OTC       Allergies:   Allergies   Allergen Reactions    Bactrim [Sulfamethoprim] Nausea and Vomiting    Omnicef [Cefdinir] Nausea and Vomiting    Aspirin Other (comments)     abd pain and cramping    Clindamycin Other (comments)     Abdominal pain    Ibuprofen Other (comments)     abd pain and cramping    Lisinopril Cough    Nsaids (Non-Steroidal Anti-Inflammatory Drug) Other (comments)     Pain and burning    Pcn [Penicillins] Rash    Tetracycline Nausea and Vomiting       CURRENT MEDICATIONS      Previous Medications    CLONAZEPAM (KLONOPIN) 0.5 MG TABLET    TAKE ONE TABLET BY MOUTH EVERY NIGHT AT BEDTIME FOR RESTLESS LEG SYNDROME ERGOCALCIFEROL (ERGOCALCIFEROL) 1,250 MCG (50,000 UNIT) CAPSULE    Take 1 cap once weekly for vitamin D deficiency    ESTRADIOL (ESTRACE) 0.5 MG TABLET    TAKE 1 TABLET BY MOUTH EVERY DAY    LEVOTHYROXINE (SYNTHROID) 100 MCG TABLET    TAKE 1 TABLET EVERY DAY BEFORE BREAKFAST    LOSARTAN (COZAAR) 100 MG TABLET    Take 1 Tablet by mouth daily. PV W-O OXANA/FERROUS FUMARATE/FA (M-VIT PO)    Take 1 Tab by mouth daily. SIMVASTATIN (ZOCOR) 20 MG TABLET    TAKE ONE TABLET BY MOUTH NIGHTLY    ZOLPIDEM (AMBIEN) 10 MG TABLET    TAKE ONE TABLET BY MOUTH EVERY NIGHT AT BEDTIME AS NEEDED FOR SLEEP       PHYSICAL EXAM      ED Triage Vitals   ED Encounter Vitals Group      BP 03/01/23 1117 (!) 159/99      Pulse (Heart Rate) 03/01/23 1117 86      Resp Rate 03/01/23 1117 16      Temp 03/01/23 1117 98.2 °F (36.8 °C)      Temp src --       O2 Sat (%) 03/01/23 1117 95 %      Weight --       Height 03/01/23 1203 5' 3\"        Physical Exam  Vitals and nursing note reviewed. Constitutional:       General: She is not in acute distress. Appearance: Normal appearance. She is not toxic-appearing. HENT:      Head: Normocephalic and atraumatic. Nose: Nose normal.      Mouth/Throat:      Mouth: Mucous membranes are dry. Eyes:      Extraocular Movements: Extraocular movements intact. Conjunctiva/sclera: Conjunctivae normal.   Cardiovascular:      Rate and Rhythm: Normal rate and regular rhythm. Pulmonary:      Effort: Pulmonary effort is normal.      Breath sounds: Normal breath sounds. Abdominal:      Palpations: Abdomen is soft. There is no mass. Tenderness: There is abdominal tenderness (Umbilical, generalized lower abdomen). There is no guarding or rebound. Musculoskeletal:      Cervical back: Normal range of motion and neck supple. Skin:     General: Skin is warm and dry. Neurological:      General: No focal deficit present. Mental Status: She is alert and oriented to person, place, and time. Gait: Gait normal.   Psychiatric:         Mood and Affect: Mood normal.         Behavior: Behavior normal.        DIAGNOSTIC RESULTS   LABS:     Recent Results (from the past 12 hour(s))   CBC WITH AUTOMATED DIFF    Collection Time: 03/01/23 12:34 PM   Result Value Ref Range    WBC 7.8 3.6 - 11.0 K/uL    RBC 4.26 3.80 - 5.20 M/uL    HGB 13.6 11.5 - 16.0 g/dL    HCT 40.8 35.0 - 47.0 %    MCV 95.8 80.0 - 99.0 FL    MCH 31.9 26.0 - 34.0 PG    MCHC 33.3 30.0 - 36.5 g/dL    RDW 14.1 11.5 - 14.5 %    PLATELET 153 799 - 374 K/uL    MPV 10.5 8.9 - 12.9 FL    NRBC 0.0 0  WBC    ABSOLUTE NRBC 0.00 0.00 - 0.01 K/uL    NEUTROPHILS 87 (H) 32 - 75 %    LYMPHOCYTES 8 (L) 12 - 49 %    MONOCYTES 4 (L) 5 - 13 %    EOSINOPHILS 0 0 - 7 %    BASOPHILS 0 0 - 1 %    IMMATURE GRANULOCYTES 1 (H) 0.0 - 0.5 %    ABS. NEUTROPHILS 6.8 1.8 - 8.0 K/UL    ABS. LYMPHOCYTES 0.6 (L) 0.8 - 3.5 K/UL    ABS. MONOCYTES 0.3 0.0 - 1.0 K/UL    ABS. EOSINOPHILS 0.0 0.0 - 0.4 K/UL    ABS. BASOPHILS 0.0 0.0 - 0.1 K/UL    ABS. IMM. GRANS. 0.1 (H) 0.00 - 0.04 K/UL    DF SMEAR SCANNED      RBC COMMENTS NORMOCYTIC, NORMOCHROMIC     METABOLIC PANEL, COMPREHENSIVE    Collection Time: 03/01/23 12:34 PM   Result Value Ref Range    Sodium 135 (L) 136 - 145 mmol/L    Potassium 3.6 3.5 - 5.1 mmol/L    Chloride 105 97 - 108 mmol/L    CO2 24 21 - 32 mmol/L    Anion gap 6 5 - 15 mmol/L    Glucose 111 (H) 65 - 100 mg/dL    BUN 8 6 - 20 MG/DL    Creatinine 0.60 0.55 - 1.02 MG/DL    BUN/Creatinine ratio 13 12 - 20      eGFR >60 >60 ml/min/1.73m2    Calcium 9.2 8.5 - 10.1 MG/DL    Bilirubin, total 0.4 0.2 - 1.0 MG/DL    ALT (SGPT) 16 12 - 78 U/L    AST (SGOT) 8 (L) 15 - 37 U/L    Alk.  phosphatase 46 45 - 117 U/L    Protein, total 6.9 6.4 - 8.2 g/dL    Albumin 3.2 (L) 3.5 - 5.0 g/dL    Globulin 3.7 2.0 - 4.0 g/dL    A-G Ratio 0.9 (L) 1.1 - 2.2     LIPASE    Collection Time: 03/01/23 12:34 PM   Result Value Ref Range    Lipase 70 (L) 73 - 393 U/L   URINALYSIS W/ RFLX MICROSCOPIC    Collection Time: 03/01/23  1:09 PM   Result Value Ref Range    Color YELLOW/STRAW      Appearance CLEAR CLEAR      Specific gravity 1.014      pH (UA) 6.0 5.0 - 8.0      Protein Negative NEG mg/dL    Glucose Negative NEG mg/dL    Ketone 80 (A) NEG mg/dL    Bilirubin Negative NEG      Blood Negative NEG      Urobilinogen 0.2 0.2 - 1.0 EU/dL    Nitrites Negative NEG      Leukocyte Esterase Negative NEG     URINE CULTURE HOLD SAMPLE    Collection Time: 03/01/23  1:09 PM    Specimen: Urine   Result Value Ref Range    Urine culture hold        Urine on hold in Microbiology dept for 2 days. If unpreserved urine is submitted, it cannot be used for addtional testing after 24 hours, recollection will be required. EKG: When ordered, EKG's are interpreted by the Emergency Department Physician in the absence of a cardiologist.  Please see their note for interpretation of EKG. RADIOLOGY:  Non-plain film images such as CT, Ultrasound and MRI are read by the radiologist. Plain radiographic images are visualized and preliminarily interpreted by the ED Provider with the below findings:          Interpretation per the Radiologist below, if available at the time of this note:     CT ABD PELV W CONT    Result Date: 3/1/2023  EXAM:  CT ABDOMEN PELVIS WITH CONTRAST INDICATION:  generalized abdominal pain, N/V, x 3 days. Additional history: COMPARISON: CT of the abdomen and pelvis, 12/5/2018. Juan Walker TECHNIQUE: Multislice helical CT was performed from the diaphragm to the symphysis pubis with oral and intravenous contrast administration. Contiguous 5 mm axial images were reconstructed and lung and soft tissue windows were generated. Coronal and sagittal reformations were generated. CT dose reduction was achieved through use of a standardized protocol tailored for this examination and automatic exposure control for dose modulation. Juan Walker  FINDINGS: INCIDENTALLY IMAGED CHEST: Heart/vessels: Minimal mitral annulus calcification. Lungs/Pleura: Within normal limits. . ABDOMEN: Liver: Diffuse, diminished attenuation throughout the liver relative to the spleen suggesting hepatic steatosis. Gallbladder/Biliary: Within normal limits. Spleen: Within normal limits. Pancreas: Within normal limits. Adrenals: Within normal limits. Kidneys: Within normal limits. Peritoneum/Mesenteries: Trace amount of fluid in the pelvis. This appearance of the central mesentery associated with distal jejunal/ileal loops Extraperitoneum: Within normal limits. Gastrointestinal tract: Patulous distal esophagus containing an air-fluid level with a lap band present. There are loops of distal jejunum/ileum which appear relatively distended with air-fluid levels. Normal-appearing appendix. Vascular: Calcifications in the aorta. Valri Dykes PELVIS: Extraperitoneum: Calculi in the pelvis suggesting phleboliths. Ureters: Within normal limits. Bladder: Within normal limits. Reproductive System: There is a cystic lesion left side of the pelvis, likely within the left ovary measuring 3.7 x 3.4 cm. . MSK: Degenerative changes in the lower lumbar spine with degenerative disc disease at L3/L4 and vacuum disc phenomenon at L2/L3. There is minimal anterior listhesis of L3 on L4. Tiny, fat-containing umbilical hernia . 1. There are loops of relatively patulous small bowel in the distal jejunum/ileum with air-fluid levels and associated amos appearance of the mesentery. Findings are nonspecific and suggests ileus/enteritis. 2. Incidental findings as above.          PROCEDURES   Unless otherwise noted below, none  Procedures     CRITICAL CARE TIME       EMERGENCY DEPARTMENT COURSE and DIFFERENTIAL DIAGNOSIS/MDM   Vitals:    Vitals:    03/01/23 1117 03/01/23 1203 03/01/23 1544   BP: (!) 159/99  (!) 142/70   Pulse: 86  67   Resp: 16  16   Temp: 98.2 °F (36.8 °C)     SpO2: 95%  95%   Height:  5' 3\" (1.6 m)         Patient was given the following medications:  Medications   sodium chloride 0.9 % bolus infusion 1,000 mL (0 mL IntraVENous IV Completed 3/1/23 1500)   morphine injection 4 mg (4 mg IntraVENous Given 3/1/23 1333)   ondansetron (ZOFRAN) injection 4 mg (4 mg IntraVENous Given 3/1/23 1321)   iopamidoL (ISOVUE-370) 370 mg iodine /mL (76 %) injection 100 mL (100 mL IntraVENous Given 3/1/23 1445)   dicyclomine (BENTYL) capsule 10 mg (10 mg Oral Given 3/1/23 1435)       CONSULTS: (Who and What was discussed)  None    Chronic Conditions:     Social Determinants affecting Dx or Tx: None    Records Reviewed (source and summary of external records): Prior medical records, Previous Radiology studies, and Previous Laboratory studies    CC/HPI Summary, DDx, ED Course, and Reassessment:     Reviewed all lab and imaging results with patient. CT shows some nonspecific enteritis/ileus. No evidence of obstruction at this time. We discussed possible etiologies of her current symptoms. Suspect likely viral.  The patient is agreeable to discharge with Bentyl and Zofran to manage her symptoms. We will also provide a wait-and-see prescription for Cipro to be taken if symptoms do not improve with conservative management over the next few days. We also discussed incidental findings on CT scan. Recommended follow-up with PCP. ED return precautions given. Disposition Considerations (Tests not done, Shared Decision Making, Pt Expectation of Test or Tx.):      FINAL IMPRESSION     1. Enteritis    2. Abnormal finding on imaging    3. Dehydration          DISPOSITION/PLAN   Discharged    Discharge Note: The patient is stable for discharge home. The signs, symptoms, diagnosis, and discharge instructions have been discussed, understanding conveyed, and agreed upon. The patient is to follow up as recommended or return to ER should their symptoms worsen.       PATIENT REFERRED TO:  Follow-up Information       Follow up With Specialties Details Why Contact Info    [facility] EMERGENCY DEPT Emergency Medicine As needed, If symptoms worsen [address] [place] [address]    Tom Quesada MD Internal Medicine Physician Call  For follow up [facility]  [phone]                DISCHARGE MEDICATIONS:  Current Discharge Medication List        START taking these medications    Details   dicyclomine (BENTYL) 10 mg/5 mL soln oral solution Take 10 mL by mouth every six (6) hours for 5 days. Qty: 200 mL, Refills: 0  Start date: 3/1/2023, End date: 3/6/2023      ondansetron hcl (Zofran) 4 mg tablet Take 1 Tablet by mouth every eight (8) hours as needed for Nausea or Vomiting. Qty: 20 Tablet, Refills: 0  Start date: 3/1/2023      ciprofloxacin HCl (Cipro) 500 mg tablet Take 1 Tablet by mouth two (2) times a day for 7 days. Qty: 14 Tablet, Refills: 0  Start date: 3/1/2023, End date: 3/8/2023               DISCONTINUED MEDICATIONS:  Current Discharge Medication List          ED Attending Involvement : I have seen and evaluated the patient. My supervision physician was available for consultation. I am the Primary Clinician of Record. Lopez Sherwood (electronically signed)    (Please note that parts of this dictation were completed with voice recognition software. Quite often unanticipated grammatical, syntax, homophones, and other interpretive errors are inadvertently transcribed by the computer software. Please disregards these errors.  Please excuse any errors that have escaped final proofreading.)

## 2023-03-03 RX ORDER — LEVOTHYROXINE SODIUM 100 UG/1
TABLET ORAL
Qty: 90 TABLET | Refills: 3 | Status: SHIPPED | OUTPATIENT
Start: 2023-03-03

## 2023-03-03 NOTE — TELEPHONE ENCOUNTER
Will forward refill for levothyroxine to Dr. Makayla Alexander as patient is no longer under my care.

## 2023-03-15 DIAGNOSIS — E78.01 FAMILIAL HYPERCHOLESTEROLEMIA: ICD-10-CM

## 2023-03-15 RX ORDER — SIMVASTATIN 20 MG/1
TABLET, FILM COATED ORAL
Qty: 90 TABLET | Refills: 3 | Status: SHIPPED | OUTPATIENT
Start: 2023-03-15

## 2023-03-24 ENCOUNTER — CLINICAL SUPPORT (OUTPATIENT)
Dept: INTERNAL MEDICINE CLINIC | Age: 68
End: 2023-03-24

## 2023-03-24 VITALS — DIASTOLIC BLOOD PRESSURE: 64 MMHG | HEART RATE: 69 BPM | SYSTOLIC BLOOD PRESSURE: 111 MMHG

## 2023-03-24 DIAGNOSIS — Z01.30 BP CHECK: Primary | ICD-10-CM

## 2023-04-21 RX ORDER — ESCITALOPRAM OXALATE 20 MG/1
TABLET ORAL
Qty: 90 TABLET | Refills: 3 | Status: SHIPPED | OUTPATIENT
Start: 2023-04-21

## 2023-08-01 DIAGNOSIS — G47.00 INSOMNIA, UNSPECIFIED TYPE: Primary | ICD-10-CM

## 2023-08-01 DIAGNOSIS — G25.81 RLS (RESTLESS LEGS SYNDROME): ICD-10-CM

## 2023-08-02 RX ORDER — ZOLPIDEM TARTRATE 10 MG/1
TABLET ORAL
Qty: 30 TABLET | Refills: 5 | Status: SHIPPED | OUTPATIENT
Start: 2023-08-02 | End: 2024-01-29

## 2023-08-02 RX ORDER — CLONAZEPAM 0.5 MG/1
TABLET ORAL
Qty: 30 TABLET | Refills: 5 | Status: SHIPPED | OUTPATIENT
Start: 2023-08-02 | End: 2024-01-29

## 2023-09-26 DIAGNOSIS — E78.5 HYPERLIPIDEMIA, UNSPECIFIED HYPERLIPIDEMIA TYPE: Primary | ICD-10-CM

## 2023-09-27 ENCOUNTER — TRANSCRIBE ORDERS (OUTPATIENT)
Facility: HOSPITAL | Age: 68
End: 2023-09-27

## 2023-09-27 DIAGNOSIS — Z12.31 VISIT FOR SCREENING MAMMOGRAM: Primary | ICD-10-CM

## 2023-10-09 ENCOUNTER — HOSPITAL ENCOUNTER (OUTPATIENT)
Facility: HOSPITAL | Age: 68
Discharge: HOME | End: 2023-10-12
Attending: OBSTETRICS & GYNECOLOGY
Payer: MEDICARE

## 2023-10-09 ENCOUNTER — HOSPITAL ENCOUNTER (OUTPATIENT)
Facility: HOSPITAL | Age: 68
Discharge: HOME | End: 2023-10-12
Attending: INTERNAL MEDICINE
Payer: MEDICARE

## 2023-10-09 VITALS — HEIGHT: 63 IN | BODY MASS INDEX: 32.6 KG/M2 | WEIGHT: 184 LBS

## 2023-10-09 DIAGNOSIS — Z12.31 VISIT FOR SCREENING MAMMOGRAM: ICD-10-CM

## 2023-10-09 DIAGNOSIS — E78.5 HYPERLIPIDEMIA, UNSPECIFIED HYPERLIPIDEMIA TYPE: ICD-10-CM

## 2023-10-09 PROCEDURE — 75571 CT HRT W/O DYE W/CA TEST: CPT

## 2023-10-09 PROCEDURE — 77067 SCR MAMMO BI INCL CAD: CPT

## 2023-10-12 RX ORDER — ATORVASTATIN CALCIUM 20 MG/1
20 TABLET, FILM COATED ORAL DAILY
Qty: 90 TABLET | Refills: 3 | Status: SHIPPED | OUTPATIENT
Start: 2023-10-12

## 2024-01-31 RX ORDER — ERGOCALCIFEROL 1.25 MG/1
CAPSULE ORAL
Qty: 13 CAPSULE | Refills: 3 | Status: SHIPPED | OUTPATIENT
Start: 2024-01-31

## 2024-02-08 DIAGNOSIS — I10 HYPERTENSION, UNSPECIFIED TYPE: Primary | ICD-10-CM

## 2024-02-08 DIAGNOSIS — E03.9 HYPOTHYROIDISM, UNSPECIFIED TYPE: ICD-10-CM

## 2024-02-08 DIAGNOSIS — E78.00 PURE HYPERCHOLESTEROLEMIA: ICD-10-CM

## 2024-02-08 DIAGNOSIS — E55.9 VITAMIN D DEFICIENCY: ICD-10-CM

## 2024-02-14 DIAGNOSIS — G47.00 INSOMNIA, UNSPECIFIED TYPE: ICD-10-CM

## 2024-02-14 DIAGNOSIS — G25.81 RLS (RESTLESS LEGS SYNDROME): ICD-10-CM

## 2024-02-15 RX ORDER — CLONAZEPAM 0.5 MG/1
0.5 TABLET ORAL NIGHTLY PRN
Qty: 30 TABLET | Refills: 2 | Status: SHIPPED | OUTPATIENT
Start: 2024-02-15 | End: 2024-05-15

## 2024-02-15 RX ORDER — ZOLPIDEM TARTRATE 10 MG/1
10 TABLET ORAL NIGHTLY PRN
Qty: 30 TABLET | Refills: 5 | Status: SHIPPED | OUTPATIENT
Start: 2024-02-15 | End: 2024-08-13

## 2024-02-19 RX ORDER — LOSARTAN POTASSIUM 100 MG/1
100 TABLET ORAL DAILY
Qty: 90 TABLET | Refills: 3 | Status: SHIPPED | OUTPATIENT
Start: 2024-02-19

## 2024-02-20 DIAGNOSIS — I10 HYPERTENSION, UNSPECIFIED TYPE: ICD-10-CM

## 2024-02-20 DIAGNOSIS — E78.00 PURE HYPERCHOLESTEROLEMIA: ICD-10-CM

## 2024-02-20 DIAGNOSIS — E03.9 HYPOTHYROIDISM, UNSPECIFIED TYPE: ICD-10-CM

## 2024-02-20 DIAGNOSIS — E55.9 VITAMIN D DEFICIENCY: ICD-10-CM

## 2024-02-20 LAB
25(OH)D3 SERPL-MCNC: 95.3 NG/ML (ref 30–100)
ALBUMIN SERPL-MCNC: 3.6 G/DL (ref 3.5–5)
ALBUMIN/GLOB SERPL: 1.1 (ref 1.1–2.2)
ALP SERPL-CCNC: 59 U/L (ref 45–117)
ALT SERPL-CCNC: 16 U/L (ref 12–78)
ANION GAP SERPL CALC-SCNC: 6 MMOL/L (ref 5–15)
AST SERPL-CCNC: 9 U/L (ref 15–37)
BILIRUB SERPL-MCNC: 0.8 MG/DL (ref 0.2–1)
BUN SERPL-MCNC: 13 MG/DL (ref 6–20)
BUN/CREAT SERPL: 15 (ref 12–20)
CALCIUM SERPL-MCNC: 9.6 MG/DL (ref 8.5–10.1)
CHLORIDE SERPL-SCNC: 110 MMOL/L (ref 97–108)
CO2 SERPL-SCNC: 24 MMOL/L (ref 21–32)
CREAT SERPL-MCNC: 0.85 MG/DL (ref 0.55–1.02)
ERYTHROCYTE [DISTWIDTH] IN BLOOD BY AUTOMATED COUNT: 13.5 % (ref 11.5–14.5)
GLOBULIN SER CALC-MCNC: 3.2 G/DL (ref 2–4)
GLUCOSE SERPL-MCNC: 97 MG/DL (ref 65–100)
HCT VFR BLD AUTO: 41.2 % (ref 35–47)
HGB BLD-MCNC: 14.1 G/DL (ref 11.5–16)
MCH RBC QN AUTO: 31.6 PG (ref 26–34)
MCHC RBC AUTO-ENTMCNC: 34.2 G/DL (ref 30–36.5)
MCV RBC AUTO: 92.4 FL (ref 80–99)
NRBC # BLD: 0 K/UL (ref 0–0.01)
NRBC BLD-RTO: 0 PER 100 WBC
PLATELET # BLD AUTO: 234 K/UL (ref 150–400)
PMV BLD AUTO: 11.7 FL (ref 8.9–12.9)
POTASSIUM SERPL-SCNC: 4.1 MMOL/L (ref 3.5–5.1)
PROT SERPL-MCNC: 6.8 G/DL (ref 6.4–8.2)
RBC # BLD AUTO: 4.46 M/UL (ref 3.8–5.2)
SODIUM SERPL-SCNC: 140 MMOL/L (ref 136–145)
WBC # BLD AUTO: 5.7 K/UL (ref 3.6–11)

## 2024-02-21 LAB
CHOLEST SERPL-MCNC: 198 MG/DL
HDLC SERPL-MCNC: 74 MG/DL
HDLC SERPL: 2.7 (ref 0–5)
LDLC SERPL CALC-MCNC: 103.2 MG/DL (ref 0–100)
TRIGL SERPL-MCNC: 104 MG/DL
TSH SERPL DL<=0.05 MIU/L-ACNC: 0.89 UIU/ML (ref 0.36–3.74)
VLDLC SERPL CALC-MCNC: 20.8 MG/DL

## 2024-02-23 SDOH — ECONOMIC STABILITY: FOOD INSECURITY: WITHIN THE PAST 12 MONTHS, YOU WORRIED THAT YOUR FOOD WOULD RUN OUT BEFORE YOU GOT MONEY TO BUY MORE.: NEVER TRUE

## 2024-02-23 SDOH — ECONOMIC STABILITY: TRANSPORTATION INSECURITY
IN THE PAST 12 MONTHS, HAS LACK OF TRANSPORTATION KEPT YOU FROM MEETINGS, WORK, OR FROM GETTING THINGS NEEDED FOR DAILY LIVING?: NO

## 2024-02-23 SDOH — HEALTH STABILITY: PHYSICAL HEALTH: ON AVERAGE, HOW MANY DAYS PER WEEK DO YOU ENGAGE IN MODERATE TO STRENUOUS EXERCISE (LIKE A BRISK WALK)?: 5 DAYS

## 2024-02-23 SDOH — ECONOMIC STABILITY: FOOD INSECURITY: WITHIN THE PAST 12 MONTHS, THE FOOD YOU BOUGHT JUST DIDN'T LAST AND YOU DIDN'T HAVE MONEY TO GET MORE.: NEVER TRUE

## 2024-02-23 SDOH — ECONOMIC STABILITY: HOUSING INSECURITY
IN THE LAST 12 MONTHS, WAS THERE A TIME WHEN YOU DID NOT HAVE A STEADY PLACE TO SLEEP OR SLEPT IN A SHELTER (INCLUDING NOW)?: NO

## 2024-02-23 SDOH — HEALTH STABILITY: PHYSICAL HEALTH: ON AVERAGE, HOW MANY MINUTES DO YOU ENGAGE IN EXERCISE AT THIS LEVEL?: 30 MIN

## 2024-02-23 SDOH — ECONOMIC STABILITY: INCOME INSECURITY: HOW HARD IS IT FOR YOU TO PAY FOR THE VERY BASICS LIKE FOOD, HOUSING, MEDICAL CARE, AND HEATING?: NOT HARD AT ALL

## 2024-02-23 ASSESSMENT — PATIENT HEALTH QUESTIONNAIRE - PHQ9
SUM OF ALL RESPONSES TO PHQ QUESTIONS 1-9: 0
SUM OF ALL RESPONSES TO PHQ9 QUESTIONS 1 & 2: 0
1. LITTLE INTEREST OR PLEASURE IN DOING THINGS: 0
SUM OF ALL RESPONSES TO PHQ QUESTIONS 1-9: 0
2. FEELING DOWN, DEPRESSED OR HOPELESS: 0

## 2024-02-23 ASSESSMENT — LIFESTYLE VARIABLES
HOW OFTEN DO YOU HAVE A DRINK CONTAINING ALCOHOL: 4
HOW MANY STANDARD DRINKS CONTAINING ALCOHOL DO YOU HAVE ON A TYPICAL DAY: 1
HOW MANY STANDARD DRINKS CONTAINING ALCOHOL DO YOU HAVE ON A TYPICAL DAY: 1 OR 2
HOW OFTEN DO YOU HAVE SIX OR MORE DRINKS ON ONE OCCASION: 1
HOW OFTEN DO YOU HAVE A DRINK CONTAINING ALCOHOL: 2-3 TIMES A WEEK

## 2024-02-26 ENCOUNTER — OFFICE VISIT (OUTPATIENT)
Age: 69
End: 2024-02-26
Payer: MEDICARE

## 2024-02-26 VITALS
HEART RATE: 67 BPM | WEIGHT: 168.4 LBS | SYSTOLIC BLOOD PRESSURE: 134 MMHG | OXYGEN SATURATION: 97 % | RESPIRATION RATE: 16 BRPM | HEIGHT: 63 IN | BODY MASS INDEX: 29.84 KG/M2 | DIASTOLIC BLOOD PRESSURE: 82 MMHG | TEMPERATURE: 98 F

## 2024-02-26 DIAGNOSIS — M85.80 OSTEOPENIA, UNSPECIFIED LOCATION: ICD-10-CM

## 2024-02-26 DIAGNOSIS — I10 HYPERTENSION, UNSPECIFIED TYPE: ICD-10-CM

## 2024-02-26 DIAGNOSIS — G47.00 INSOMNIA, UNSPECIFIED TYPE: ICD-10-CM

## 2024-02-26 DIAGNOSIS — Z00.00 MEDICARE ANNUAL WELLNESS VISIT, SUBSEQUENT: ICD-10-CM

## 2024-02-26 DIAGNOSIS — E78.5 HYPERLIPIDEMIA, UNSPECIFIED HYPERLIPIDEMIA TYPE: ICD-10-CM

## 2024-02-26 DIAGNOSIS — Z23 ENCOUNTER FOR IMMUNIZATION: Primary | ICD-10-CM

## 2024-02-26 DIAGNOSIS — E03.9 HYPOTHYROIDISM, UNSPECIFIED TYPE: ICD-10-CM

## 2024-02-26 PROCEDURE — 3017F COLORECTAL CA SCREEN DOC REV: CPT | Performed by: INTERNAL MEDICINE

## 2024-02-26 PROCEDURE — 99214 OFFICE O/P EST MOD 30 MIN: CPT | Performed by: INTERNAL MEDICINE

## 2024-02-26 PROCEDURE — G0439 PPPS, SUBSEQ VISIT: HCPCS | Performed by: INTERNAL MEDICINE

## 2024-02-26 PROCEDURE — 1036F TOBACCO NON-USER: CPT | Performed by: INTERNAL MEDICINE

## 2024-02-26 PROCEDURE — G8419 CALC BMI OUT NRM PARAM NOF/U: HCPCS | Performed by: INTERNAL MEDICINE

## 2024-02-26 PROCEDURE — G8484 FLU IMMUNIZE NO ADMIN: HCPCS | Performed by: INTERNAL MEDICINE

## 2024-02-26 PROCEDURE — 90677 PCV20 VACCINE IM: CPT | Performed by: INTERNAL MEDICINE

## 2024-02-26 PROCEDURE — 3075F SYST BP GE 130 - 139MM HG: CPT | Performed by: INTERNAL MEDICINE

## 2024-02-26 PROCEDURE — G8427 DOCREV CUR MEDS BY ELIG CLIN: HCPCS | Performed by: INTERNAL MEDICINE

## 2024-02-26 PROCEDURE — G8399 PT W/DXA RESULTS DOCUMENT: HCPCS | Performed by: INTERNAL MEDICINE

## 2024-02-26 PROCEDURE — 1090F PRES/ABSN URINE INCON ASSESS: CPT | Performed by: INTERNAL MEDICINE

## 2024-02-26 PROCEDURE — 3079F DIAST BP 80-89 MM HG: CPT | Performed by: INTERNAL MEDICINE

## 2024-02-26 PROCEDURE — PBSHW PNEUMOCOCCAL, PCV20, PREVNAR 20, (AGE 6W+), IM, PF: Performed by: INTERNAL MEDICINE

## 2024-02-26 PROCEDURE — 1123F ACP DISCUSS/DSCN MKR DOCD: CPT | Performed by: INTERNAL MEDICINE

## 2024-02-26 RX ORDER — M-VIT,TX,IRON,MINS/CALC/FOLIC 27MG-0.4MG
1 TABLET ORAL DAILY
COMMUNITY

## 2024-02-26 RX ORDER — ESCITALOPRAM OXALATE 20 MG/1
1 TABLET ORAL DAILY
COMMUNITY
Start: 2022-01-11

## 2024-02-26 RX ORDER — CETIRIZINE HYDROCHLORIDE 10 MG/1
10 TABLET ORAL
COMMUNITY
Start: 2021-08-24

## 2024-02-26 RX ORDER — ACETAMINOPHEN 80 MG/1
80 TABLET, CHEWABLE ORAL EVERY 4 HOURS PRN
COMMUNITY

## 2024-02-26 ASSESSMENT — PATIENT HEALTH QUESTIONNAIRE - PHQ9
SUM OF ALL RESPONSES TO PHQ QUESTIONS 1-9: 0
SUM OF ALL RESPONSES TO PHQ9 QUESTIONS 1 & 2: 0
1. LITTLE INTEREST OR PLEASURE IN DOING THINGS: 0
SUM OF ALL RESPONSES TO PHQ QUESTIONS 1-9: 0

## 2024-02-26 ASSESSMENT — LIFESTYLE VARIABLES
HOW MANY STANDARD DRINKS CONTAINING ALCOHOL DO YOU HAVE ON A TYPICAL DAY: 1 OR 2
HOW OFTEN DO YOU HAVE A DRINK CONTAINING ALCOHOL: MONTHLY OR LESS

## 2024-02-26 NOTE — PROGRESS NOTES
After obtaining consent, and per orders of Dr. MAYEN, injection of PREVNAR 20 VACCINE given in Right deltoid by Sarika Traylor MA. Patient instructed to remain in clinic for 20 minutes afterwards, and to report any adverse reaction to me immediately.   
Prudence Vasquez is a 69 y.o. female    Chief Complaint   Patient presents with    Medicare AWV       /82   Pulse 67   Temp 98 °F (36.7 °C)   Resp 16   Ht 1.6 m (5' 3\")   Wt 76.4 kg (168 lb 6.4 oz)   SpO2 97%   BMI 29.83 kg/m²         1. Have you been to the ER, urgent care clinic since your last visit?  Hospitalized since your last visit? No    2. Have you seen or consulted any other health care providers outside of the [facility] since your last visit?  Include any pap smears or colon screening. No    Learning Assessment:       No data to display                Fall Risk Assessment:      2/26/2024     9:38 AM 2/23/2024     8:07 AM 2/23/2023     9:08 AM 2/22/2022     9:06 AM   Amb Fall Risk Assessment and TUG Test   Do you feel unsteady or are you worried about falling?  no no     2 or more falls in past year? no no     Fall with injury in past year? no no     Fall in past 12 months?   0 0   Able to walk?   Yes Yes       Abuse Screening:       No data to display                ADL Screening:       No data to display                
Effort: Pulmonary effort is normal.   Abdominal:      General: Abdomen is flat. Bowel sounds are normal.   Musculoskeletal:         General: Normal range of motion.      Cervical back: Normal range of motion.      Right lower leg: No edema.   Neurological:      General: No focal deficit present.      Mental Status: She is alert.   Psychiatric:         Mood and Affect: Mood normal.         Behavior: Behavior normal.         Thought Content: Thought content normal.         Judgment: Judgment normal.          ASSESSMENT and PLAN  There are no diagnoses linked to this encounter.   Prduence was seen today for medicare awv.    Diagnoses and all orders for this visit:    Encounter for immunization  -     Pneumococcal, PCV20, PREVNAR 20, (age 6w+), IM, PF    Hypertension, unspecified type   Good control on losartan  Hyperlipidemia, unspecified hyperlipidemia type   LDL very close to goal on lipitor 20 mg qd-continue  Hypothyroidism, unspecified type   Euthyroid on current dose levothyroxine  Osteopenia, unspecified location   Weight bearing exercise   Can repeat DEXA next year  Insomnia, unspecified type   Refill klonopin when needed for RLS    Vit d deficiency   Hig normal vit d level-stop ergocalciferol    Hold vit d supplement for 1 month then take otc vit d 1000 iu qd       Kel Vasques MD Medicare Annual Wellness Visit    Prudence Vasquez is here for Medicare AWV    Assessment & Plan   Encounter for immunization  Hypertension, unspecified type  Hyperlipidemia, unspecified hyperlipidemia type  Hypothyroidism, unspecified type  Osteopenia, unspecified location  Insomnia, unspecified type    Recommendations for Preventive Services Due: see orders and patient instructions/AVS.  Recommended screening schedule for the next 5-10 years is provided to the patient in written form: see Patient Instructions/AVS.     No follow-ups on file.     Subjective       Patient's complete Health Risk Assessment and screening values have been

## 2024-02-26 NOTE — PATIENT INSTRUCTIONS
power of .  This form lets you name a person to make treatment decisions for you when you can't speak for yourself. This person is called a health care agent (health care proxy, health care surrogate). The form is also called a durable power of  for health care.  If you do not have an advance directive, decisions about your medical care may be made by a family member, or by a doctor or a  who doesn't know you.  It may help to think of an advance directive as a gift to the people who care for you. If you have one, they won't have to make tough decisions by themselves.  For more information, including forms for your state, see the [employer] website (www.caringinfo.org/planning/advance-directives/).  Follow-up care is a key part of your treatment and safety. Be sure to make and go to all appointments, and call your doctor if you are having problems. It's also a good idea to know your test results and keep a list of the medicines you take.  What should you include in an advance directive?  Many states have a unique advance directive form. (It may ask you to address specific issues.) Or you might use a universal form that's approved by many states.  If your form doesn't tell you what to address, it may be hard to know what to include in your advance directive. Use the questions below to help you get started.  Who do you want to make decisions about your medical care if you are not able to?  What life-support measures do you want if you have a serious illness that gets worse over time or can't be cured?  What are you most afraid of that might happen? (Maybe you're afraid of having pain, losing your independence, or being kept alive by machines.)  Where would you prefer to die? (Your home? A hospital? A nursing home?)  Do you want to donate your organs when you die?  Do you want certain Restorationism practices performed before you die?  When should you call for help?  Be sure to contact your doctor if you

## 2024-04-22 RX ORDER — LEVOTHYROXINE SODIUM 0.1 MG/1
100 TABLET ORAL
Qty: 90 TABLET | Refills: 2 | Status: SHIPPED | OUTPATIENT
Start: 2024-04-22

## 2024-04-22 NOTE — TELEPHONE ENCOUNTER
----- Message from Prudence Vasquez sent at 4/22/2024  3:15 PM EDT -----  Regarding: Levothyroxine  Contact: [phone]  Bishnu Vasques,  [facility] menu will not allow me to have my levothyroxine 100 mcg refilled from the list. Please send a new prescription to [facility] pharmacy on [facility]. Thank you foar your time.  Prudence Vasquez

## 2024-04-22 NOTE — TELEPHONE ENCOUNTER
PCP: Kel Vasques MD    Last appt:   2/26/2024    No future appointments.    Requested Prescriptions     Pending Prescriptions Disp Refills    levothyroxine (SYNTHROID) 100 MCG tablet 90 tablet 2     Sig: Take 1 tablet by mouth every morning (before breakfast)

## 2024-05-24 DIAGNOSIS — G25.81 RLS (RESTLESS LEGS SYNDROME): ICD-10-CM

## 2024-05-24 RX ORDER — CLONAZEPAM 0.5 MG/1
TABLET ORAL
Qty: 30 TABLET | Refills: 5 | Status: SHIPPED | OUTPATIENT
Start: 2024-05-24 | End: 2024-11-25

## 2024-08-27 DIAGNOSIS — G47.00 INSOMNIA, UNSPECIFIED TYPE: ICD-10-CM

## 2024-08-28 RX ORDER — ZOLPIDEM TARTRATE 10 MG/1
10 TABLET ORAL NIGHTLY PRN
Qty: 30 TABLET | Refills: 5 | Status: SHIPPED | OUTPATIENT
Start: 2024-08-28 | End: 2025-02-24

## 2024-08-28 RX ORDER — ESCITALOPRAM OXALATE 20 MG/1
20 TABLET ORAL DAILY
Qty: 90 TABLET | Refills: 3 | Status: SHIPPED | OUTPATIENT
Start: 2024-08-28

## 2024-10-14 RX ORDER — ATORVASTATIN CALCIUM 20 MG/1
20 TABLET, FILM COATED ORAL DAILY
Qty: 90 TABLET | Refills: 3 | Status: SHIPPED | OUTPATIENT
Start: 2024-10-14

## 2024-11-27 DIAGNOSIS — G25.81 RLS (RESTLESS LEGS SYNDROME): ICD-10-CM

## 2024-11-29 RX ORDER — CLONAZEPAM 0.5 MG/1
TABLET ORAL
Qty: 30 TABLET | Refills: 2 | Status: SHIPPED | OUTPATIENT
Start: 2024-11-29 | End: 2025-02-27

## 2024-12-20 ENCOUNTER — HOSPITAL ENCOUNTER (OUTPATIENT)
Facility: HOSPITAL | Age: 69
Discharge: HOME | End: 2024-12-23
Payer: MEDICARE

## 2024-12-20 VITALS — BODY MASS INDEX: 29.77 KG/M2 | HEIGHT: 63 IN | WEIGHT: 168 LBS

## 2024-12-20 DIAGNOSIS — Z12.31 SCREENING MAMMOGRAM FOR BREAST CANCER: ICD-10-CM

## 2024-12-20 PROCEDURE — 77063 BREAST TOMOSYNTHESIS BI: CPT

## 2025-01-22 RX ORDER — LEVOTHYROXINE SODIUM 100 UG/1
100 TABLET ORAL
Qty: 90 TABLET | Refills: 2 | Status: SHIPPED | OUTPATIENT
Start: 2025-01-22

## 2025-02-13 ENCOUNTER — TELEPHONE (OUTPATIENT)
Age: 70
End: 2025-02-13

## 2025-02-14 DIAGNOSIS — E78.5 HYPERLIPIDEMIA, UNSPECIFIED HYPERLIPIDEMIA TYPE: ICD-10-CM

## 2025-02-14 DIAGNOSIS — E55.9 VITAMIN D DEFICIENCY: ICD-10-CM

## 2025-02-14 DIAGNOSIS — I10 HYPERTENSION, UNSPECIFIED TYPE: Primary | ICD-10-CM

## 2025-02-14 DIAGNOSIS — E03.9 HYPOTHYROIDISM, UNSPECIFIED TYPE: ICD-10-CM

## 2025-02-14 NOTE — TELEPHONE ENCOUNTER
Spoke with patient. Advised prelabs ordered prior to next office visit. Pt verbalized understanding to go to [facility] 1 week prior.

## 2025-03-03 DIAGNOSIS — G47.00 INSOMNIA, UNSPECIFIED TYPE: ICD-10-CM

## 2025-03-03 DIAGNOSIS — G25.81 RLS (RESTLESS LEGS SYNDROME): ICD-10-CM

## 2025-03-03 RX ORDER — LOSARTAN POTASSIUM 100 MG/1
100 TABLET ORAL DAILY
Qty: 90 TABLET | Refills: 3 | Status: SHIPPED | OUTPATIENT
Start: 2025-03-03

## 2025-03-04 RX ORDER — CLONAZEPAM 0.5 MG/1
TABLET ORAL
Qty: 30 TABLET | Refills: 5 | Status: SHIPPED | OUTPATIENT
Start: 2025-03-04 | End: 2025-06-02

## 2025-03-04 RX ORDER — ZOLPIDEM TARTRATE 10 MG/1
TABLET ORAL
Qty: 30 TABLET | Refills: 5 | Status: SHIPPED | OUTPATIENT
Start: 2025-03-04 | End: 2025-09-04

## 2025-03-12 DIAGNOSIS — E03.9 HYPOTHYROIDISM, UNSPECIFIED TYPE: ICD-10-CM

## 2025-03-12 DIAGNOSIS — I10 HYPERTENSION, UNSPECIFIED TYPE: ICD-10-CM

## 2025-03-12 DIAGNOSIS — E55.9 VITAMIN D DEFICIENCY: ICD-10-CM

## 2025-03-12 DIAGNOSIS — E78.5 HYPERLIPIDEMIA, UNSPECIFIED HYPERLIPIDEMIA TYPE: ICD-10-CM

## 2025-03-12 LAB
25(OH)D3 SERPL-MCNC: 38.6 NG/ML (ref 30–100)
ALBUMIN SERPL-MCNC: 3.6 G/DL (ref 3.5–5)
ALBUMIN/GLOB SERPL: 1.1 (ref 1.1–2.2)
ALP SERPL-CCNC: 71 U/L (ref 45–117)
ALT SERPL-CCNC: 13 U/L (ref 12–78)
ANION GAP SERPL CALC-SCNC: 6 MMOL/L (ref 2–12)
AST SERPL-CCNC: 11 U/L (ref 15–37)
BILIRUB SERPL-MCNC: 0.7 MG/DL (ref 0.2–1)
BUN SERPL-MCNC: 12 MG/DL (ref 6–20)
BUN/CREAT SERPL: 14 (ref 12–20)
CALCIUM SERPL-MCNC: 10 MG/DL (ref 8.5–10.1)
CHLORIDE SERPL-SCNC: 104 MMOL/L (ref 97–108)
CHOLEST SERPL-MCNC: 198 MG/DL
CO2 SERPL-SCNC: 24 MMOL/L (ref 21–32)
CREAT SERPL-MCNC: 0.84 MG/DL (ref 0.55–1.02)
ERYTHROCYTE [DISTWIDTH] IN BLOOD BY AUTOMATED COUNT: 14.8 % (ref 11.5–14.5)
GLOBULIN SER CALC-MCNC: 3.4 G/DL (ref 2–4)
GLUCOSE SERPL-MCNC: 97 MG/DL (ref 65–100)
HCT VFR BLD AUTO: 40.5 % (ref 35–47)
HDLC SERPL-MCNC: 83 MG/DL
HDLC SERPL: 2.4 (ref 0–5)
HGB BLD-MCNC: 13.5 G/DL (ref 11.5–16)
LDLC SERPL CALC-MCNC: 88.6 MG/DL (ref 0–100)
MCH RBC QN AUTO: 30.5 PG (ref 26–34)
MCHC RBC AUTO-ENTMCNC: 33.3 G/DL (ref 30–36.5)
MCV RBC AUTO: 91.4 FL (ref 80–99)
NRBC # BLD: 0 K/UL (ref 0–0.01)
NRBC BLD-RTO: 0 PER 100 WBC
PLATELET # BLD AUTO: 241 K/UL (ref 150–400)
PMV BLD AUTO: 10.8 FL (ref 8.9–12.9)
POTASSIUM SERPL-SCNC: 4.3 MMOL/L (ref 3.5–5.1)
PROT SERPL-MCNC: 7 G/DL (ref 6.4–8.2)
RBC # BLD AUTO: 4.43 M/UL (ref 3.8–5.2)
SODIUM SERPL-SCNC: 134 MMOL/L (ref 136–145)
TRIGL SERPL-MCNC: 132 MG/DL
TSH SERPL DL<=0.05 MIU/L-ACNC: 0.5 UIU/ML (ref 0.36–3.74)
VLDLC SERPL CALC-MCNC: 26.4 MG/DL
WBC # BLD AUTO: 5.4 K/UL (ref 3.6–11)

## 2025-03-12 SDOH — HEALTH STABILITY: PHYSICAL HEALTH: ON AVERAGE, HOW MANY MINUTES DO YOU ENGAGE IN EXERCISE AT THIS LEVEL?: 40 MIN

## 2025-03-12 SDOH — HEALTH STABILITY: PHYSICAL HEALTH: ON AVERAGE, HOW MANY DAYS PER WEEK DO YOU ENGAGE IN MODERATE TO STRENUOUS EXERCISE (LIKE A BRISK WALK)?: 4 DAYS

## 2025-03-12 ASSESSMENT — LIFESTYLE VARIABLES
HAVE YOU OR SOMEONE ELSE BEEN INJURED AS A RESULT OF YOUR DRINKING: NO
HOW OFTEN DO YOU HAVE A DRINK CONTAINING ALCOHOL: 4 OR MORE TIMES A WEEK
HAVE YOU OR SOMEONE ELSE BEEN INJURED AS A RESULT OF YOUR DRINKING: NO
HOW OFTEN DURING THE LAST YEAR HAVE YOU FOUND THAT YOU WERE NOT ABLE TO STOP DRINKING ONCE YOU HAD STARTED: NEVER
HAS A RELATIVE, FRIEND, DOCTOR, OR ANOTHER HEALTH PROFESSIONAL EXPRESSED CONCERN ABOUT YOUR DRINKING OR SUGGESTED YOU CUT DOWN: NO
HOW MANY STANDARD DRINKS CONTAINING ALCOHOL DO YOU HAVE ON A TYPICAL DAY: 1
HAS A RELATIVE, FRIEND, DOCTOR, OR ANOTHER HEALTH PROFESSIONAL EXPRESSED CONCERN ABOUT YOUR DRINKING OR SUGGESTED YOU CUT DOWN: NO
HOW OFTEN DURING THE LAST YEAR HAVE YOU HAD A FEELING OF GUILT OR REMORSE AFTER DRINKING: NEVER
HOW OFTEN DURING THE LAST YEAR HAVE YOU NEEDED AN ALCOHOLIC DRINK FIRST THING IN THE MORNING TO GET YOURSELF GOING AFTER A NIGHT OF HEAVY DRINKING: NEVER
HOW OFTEN DURING THE LAST YEAR HAVE YOU HAD A FEELING OF GUILT OR REMORSE AFTER DRINKING: NEVER
HOW OFTEN DO YOU HAVE A DRINK CONTAINING ALCOHOL: 5
HOW OFTEN DURING THE LAST YEAR HAVE YOU FAILED TO DO WHAT WAS NORMALLY EXPECTED FROM YOU BECAUSE OF DRINKING: NEVER
HOW MANY STANDARD DRINKS CONTAINING ALCOHOL DO YOU HAVE ON A TYPICAL DAY: 1 OR 2
HOW OFTEN DO YOU HAVE SIX OR MORE DRINKS ON ONE OCCASION: 2
HOW OFTEN DURING THE LAST YEAR HAVE YOU FAILED TO DO WHAT WAS NORMALLY EXPECTED FROM YOU BECAUSE OF DRINKING: NEVER
HOW OFTEN DURING THE LAST YEAR HAVE YOU BEEN UNABLE TO REMEMBER WHAT HAPPENED THE NIGHT BEFORE BECAUSE YOU HAD BEEN DRINKING: NEVER
HOW OFTEN DURING THE LAST YEAR HAVE YOU FOUND THAT YOU WERE NOT ABLE TO STOP DRINKING ONCE YOU HAD STARTED: NEVER
HOW OFTEN DURING THE LAST YEAR HAVE YOU BEEN UNABLE TO REMEMBER WHAT HAPPENED THE NIGHT BEFORE BECAUSE YOU HAD BEEN DRINKING: NEVER
HOW OFTEN DURING THE LAST YEAR HAVE YOU NEEDED AN ALCOHOLIC DRINK FIRST THING IN THE MORNING TO GET YOURSELF GOING AFTER A NIGHT OF HEAVY DRINKING: NEVER

## 2025-03-12 ASSESSMENT — PATIENT HEALTH QUESTIONNAIRE - PHQ9
2. FEELING DOWN, DEPRESSED OR HOPELESS: NOT AT ALL
1. LITTLE INTEREST OR PLEASURE IN DOING THINGS: SEVERAL DAYS
SUM OF ALL RESPONSES TO PHQ QUESTIONS 1-9: 1

## 2025-03-14 SDOH — ECONOMIC STABILITY: FOOD INSECURITY: WITHIN THE PAST 12 MONTHS, YOU WORRIED THAT YOUR FOOD WOULD RUN OUT BEFORE YOU GOT MONEY TO BUY MORE.: NEVER TRUE

## 2025-03-14 SDOH — ECONOMIC STABILITY: INCOME INSECURITY: IN THE LAST 12 MONTHS, WAS THERE A TIME WHEN YOU WERE NOT ABLE TO PAY THE MORTGAGE OR RENT ON TIME?: NO

## 2025-03-14 SDOH — ECONOMIC STABILITY: TRANSPORTATION INSECURITY
IN THE PAST 12 MONTHS, HAS THE LACK OF TRANSPORTATION KEPT YOU FROM MEDICAL APPOINTMENTS OR FROM GETTING MEDICATIONS?: NO

## 2025-03-14 SDOH — ECONOMIC STABILITY: FOOD INSECURITY: WITHIN THE PAST 12 MONTHS, THE FOOD YOU BOUGHT JUST DIDN'T LAST AND YOU DIDN'T HAVE MONEY TO GET MORE.: NEVER TRUE

## 2025-03-17 ENCOUNTER — OFFICE VISIT (OUTPATIENT)
Age: 70
End: 2025-03-17
Payer: MEDICARE

## 2025-03-17 VITALS
RESPIRATION RATE: 18 BRPM | SYSTOLIC BLOOD PRESSURE: 130 MMHG | BODY MASS INDEX: 30.58 KG/M2 | DIASTOLIC BLOOD PRESSURE: 86 MMHG | WEIGHT: 172.6 LBS | TEMPERATURE: 97 F | HEIGHT: 63 IN | HEART RATE: 83 BPM | OXYGEN SATURATION: 98 %

## 2025-03-17 DIAGNOSIS — E03.9 HYPOTHYROIDISM, UNSPECIFIED TYPE: ICD-10-CM

## 2025-03-17 DIAGNOSIS — E78.5 HYPERLIPIDEMIA, UNSPECIFIED HYPERLIPIDEMIA TYPE: ICD-10-CM

## 2025-03-17 DIAGNOSIS — G25.81 RLS (RESTLESS LEGS SYNDROME): Primary | ICD-10-CM

## 2025-03-17 DIAGNOSIS — Z00.00 MEDICARE ANNUAL WELLNESS VISIT, SUBSEQUENT: ICD-10-CM

## 2025-03-17 DIAGNOSIS — I10 HYPERTENSION, UNSPECIFIED TYPE: ICD-10-CM

## 2025-03-17 DIAGNOSIS — Z78.0 MENOPAUSE: ICD-10-CM

## 2025-03-17 DIAGNOSIS — Z12.11 COLON CANCER SCREENING: ICD-10-CM

## 2025-03-17 DIAGNOSIS — M85.80 OSTEOPENIA, UNSPECIFIED LOCATION: ICD-10-CM

## 2025-03-17 PROCEDURE — 3079F DIAST BP 80-89 MM HG: CPT | Performed by: INTERNAL MEDICINE

## 2025-03-17 PROCEDURE — G8427 DOCREV CUR MEDS BY ELIG CLIN: HCPCS | Performed by: INTERNAL MEDICINE

## 2025-03-17 PROCEDURE — G0439 PPPS, SUBSEQ VISIT: HCPCS | Performed by: INTERNAL MEDICINE

## 2025-03-17 PROCEDURE — 1159F MED LIST DOCD IN RCRD: CPT | Performed by: INTERNAL MEDICINE

## 2025-03-17 PROCEDURE — 1123F ACP DISCUSS/DSCN MKR DOCD: CPT | Performed by: INTERNAL MEDICINE

## 2025-03-17 PROCEDURE — 1090F PRES/ABSN URINE INCON ASSESS: CPT | Performed by: INTERNAL MEDICINE

## 2025-03-17 PROCEDURE — 3075F SYST BP GE 130 - 139MM HG: CPT | Performed by: INTERNAL MEDICINE

## 2025-03-17 PROCEDURE — 99214 OFFICE O/P EST MOD 30 MIN: CPT | Performed by: INTERNAL MEDICINE

## 2025-03-17 PROCEDURE — 1036F TOBACCO NON-USER: CPT | Performed by: INTERNAL MEDICINE

## 2025-03-17 PROCEDURE — G8417 CALC BMI ABV UP PARAM F/U: HCPCS | Performed by: INTERNAL MEDICINE

## 2025-03-17 PROCEDURE — G8399 PT W/DXA RESULTS DOCUMENT: HCPCS | Performed by: INTERNAL MEDICINE

## 2025-03-17 PROCEDURE — 3017F COLORECTAL CA SCREEN DOC REV: CPT | Performed by: INTERNAL MEDICINE

## 2025-03-17 RX ORDER — MAGNESIUM GLYCINATE 100 MG
CAPSULE ORAL
COMMUNITY
Start: 2025-03-14

## 2025-03-17 RX ORDER — CHOLECALCIFEROL (VITAMIN D3) 50 MCG
1000 TABLET ORAL
COMMUNITY

## 2025-03-17 SDOH — ECONOMIC STABILITY: FOOD INSECURITY: WITHIN THE PAST 12 MONTHS, THE FOOD YOU BOUGHT JUST DIDN'T LAST AND YOU DIDN'T HAVE MONEY TO GET MORE.: NEVER TRUE

## 2025-03-17 SDOH — ECONOMIC STABILITY: FOOD INSECURITY: WITHIN THE PAST 12 MONTHS, YOU WORRIED THAT YOUR FOOD WOULD RUN OUT BEFORE YOU GOT MONEY TO BUY MORE.: NEVER TRUE

## 2025-03-17 NOTE — PROGRESS NOTES
\"Have you been to the ER, urgent care clinic since your last visit?  Hospitalized since your last visit?\"    NO    “Have you seen or consulted any other health care providers outside our system since your last visit?”    NO      “Have you had a colorectal cancer screening such as a colonoscopy/FIT/Cologuard?    Cologuard two years ago    Date of last Colonoscopy: 5/4/2007  Date of last Cologuard: 3/7/2022  Date of last FIT: 11/9/2019   No flexible sigmoidoscopy on file            
    Recommendations for Preventive Services Due: see orders and patient instructions/AVS.  Recommended screening schedule for the next 5-10 years is provided to the patient in written form: see Patient Instructions/AVS.     Reviewed and updated this visit:  Allergies  Meds  Med Hx  Sexual Hx

## 2025-03-17 NOTE — PATIENT INSTRUCTIONS
9 Ways to Cut Back on Drinking  Maybe you've found yourself drinking more alcohol than you'd prefer. If you want to cut back, here are some ideas to try.    Think before you drink.  Do you really want a drink, or is it just a habit? If you're used to having a drink at a certain time, try doing something else then.     Look for substitutes.  Find some no-alcohol drinks that you enjoy, like flavored seltzer water, tea with honey, or tonic with a slice of lime. Or try alcohol-free beer or \"virgin\" cocktails (without the alcohol).     Drink more water.  Use water to quench your thirst. Drink a glass of water before you have any alcohol. Have another glass along with every drink or between drinks.     Shrink your drink.  For example, have a bottle of beer instead of a pint. Use a smaller glass for wine. Choose drinks with lower alcohol content (ABV%). Or use less liquor and more mixer in cocktails.     Slow down.  It's easy to drink quickly and without thinking about it. Pay attention, and make each drink last longer.     Do the math.  Total up how much you spend on alcohol each month. How much is that a year? If you cut back, what could you do with the money you save?     Take a break.  Choose a day or two each week when you won't drink at all. Notice how you feel on those days, physically and emotionally. How did you sleep? Do you feel better? Over time, add more break days.     Count calories.  Would you like to lose some weight? For some people that's a good motivator for cutting back. Figure out how many calories are in each drink. How many does that add up to in a day? In a week? In a month?     Practice saying no.  Be ready when someone offers you a drink. Try: \"Thanks, I've had enough.\" Or \"Thanks, but I'm cutting back.\" Or \"No, thanks. I feel better when I drink less.\"   Current as of: August 20, 2024  Content Version: 14.4  © 0795-7380 [employer] [facility].   Care instructions adapted under license by

## 2025-03-28 ENCOUNTER — RESULTS FOLLOW-UP (OUTPATIENT)
Age: 70
End: 2025-03-28

## 2025-03-28 LAB — NONINV COLON CA DNA+OCC BLD SCRN STL QL: NEGATIVE

## 2025-04-20 ENCOUNTER — APPOINTMENT (OUTPATIENT)
Facility: HOSPITAL | Age: 70
DRG: 390 | End: 2025-04-20
Payer: MEDICARE

## 2025-04-20 ENCOUNTER — HOSPITAL ENCOUNTER (INPATIENT)
Facility: HOSPITAL | Age: 70
LOS: 2 days | Discharge: HOME | DRG: 390 | End: 2025-04-22
Attending: STUDENT IN AN ORGANIZED HEALTH CARE EDUCATION/TRAINING PROGRAM | Admitting: STUDENT IN AN ORGANIZED HEALTH CARE EDUCATION/TRAINING PROGRAM
Payer: MEDICARE

## 2025-04-20 DIAGNOSIS — N83.202 CYST OF LEFT OVARY: ICD-10-CM

## 2025-04-20 DIAGNOSIS — K56.609 SMALL BOWEL OBSTRUCTION (HCC): Primary | ICD-10-CM

## 2025-04-20 LAB
ALBUMIN SERPL-MCNC: 3.5 G/DL (ref 3.5–5)
ALBUMIN/GLOB SERPL: 0.9 (ref 1.1–2.2)
ALP SERPL-CCNC: 50 U/L (ref 45–117)
ALT SERPL-CCNC: 18 U/L (ref 12–78)
ANION GAP SERPL CALC-SCNC: 12 MMOL/L (ref 2–12)
APPEARANCE UR: CLEAR
AST SERPL-CCNC: 26 U/L (ref 15–37)
BACTERIA URNS QL MICRO: ABNORMAL /HPF
BASOPHILS # BLD: 0.04 K/UL (ref 0–0.1)
BASOPHILS NFR BLD: 0.3 % (ref 0–1)
BILIRUB SERPL-MCNC: 0.6 MG/DL (ref 0.2–1)
BILIRUB UR QL: NEGATIVE
BUN SERPL-MCNC: 21 MG/DL (ref 6–20)
BUN/CREAT SERPL: 18 (ref 12–20)
CALCIUM SERPL-MCNC: 10 MG/DL (ref 8.5–10.1)
CHLORIDE SERPL-SCNC: 102 MMOL/L (ref 97–108)
CO2 SERPL-SCNC: 18 MMOL/L (ref 21–32)
COLOR UR: ABNORMAL
CREAT SERPL-MCNC: 1.15 MG/DL (ref 0.55–1.02)
DIFFERENTIAL METHOD BLD: ABNORMAL
EKG ATRIAL RATE: 93 BPM
EKG DIAGNOSIS: NORMAL
EKG P AXIS: 61 DEGREES
EKG P-R INTERVAL: 188 MS
EKG Q-T INTERVAL: 372 MS
EKG QRS DURATION: 74 MS
EKG QTC CALCULATION (BAZETT): 462 MS
EKG R AXIS: 7 DEGREES
EKG T AXIS: 96 DEGREES
EKG VENTRICULAR RATE: 93 BPM
EOSINOPHIL # BLD: 0.02 K/UL (ref 0–0.4)
EOSINOPHIL NFR BLD: 0.2 % (ref 0–7)
EPITH CASTS URNS QL MICRO: ABNORMAL /LPF
ERYTHROCYTE [DISTWIDTH] IN BLOOD BY AUTOMATED COUNT: 13.5 % (ref 11.5–14.5)
GLOBULIN SER CALC-MCNC: 3.8 G/DL (ref 2–4)
GLUCOSE SERPL-MCNC: 212 MG/DL (ref 65–100)
GLUCOSE UR STRIP.AUTO-MCNC: NEGATIVE MG/DL
HCT VFR BLD AUTO: 47.5 % (ref 35–47)
HGB BLD-MCNC: 16 G/DL (ref 11.5–16)
HGB UR QL STRIP: NEGATIVE
HYALINE CASTS URNS QL MICRO: ABNORMAL /LPF (ref 0–2)
IMM GRANULOCYTES # BLD AUTO: 0.04 K/UL (ref 0–0.04)
IMM GRANULOCYTES NFR BLD AUTO: 0.3 % (ref 0–0.5)
KETONES UR QL STRIP.AUTO: ABNORMAL MG/DL
LACTATE BLD-SCNC: 2.12 MMOL/L (ref 0.4–2)
LEUKOCYTE ESTERASE UR QL STRIP.AUTO: NEGATIVE
LIPASE SERPL-CCNC: 25 U/L (ref 13–75)
LYMPHOCYTES # BLD: 1.51 K/UL (ref 0.8–3.5)
LYMPHOCYTES NFR BLD: 12.1 % (ref 12–49)
MCH RBC QN AUTO: 30.5 PG (ref 26–34)
MCHC RBC AUTO-ENTMCNC: 33.7 G/DL (ref 30–36.5)
MCV RBC AUTO: 90.6 FL (ref 80–99)
MONOCYTES # BLD: 0.43 K/UL (ref 0–1)
MONOCYTES NFR BLD: 3.4 % (ref 5–13)
NEUTS SEG # BLD: 10.47 K/UL (ref 1.8–8)
NEUTS SEG NFR BLD: 83.7 % (ref 32–75)
NITRITE UR QL STRIP.AUTO: NEGATIVE
NRBC # BLD: 0 K/UL (ref 0–0.01)
NRBC BLD-RTO: 0 PER 100 WBC
PH UR STRIP: 6 (ref 5–8)
PLATELET # BLD AUTO: 297 K/UL (ref 150–400)
PMV BLD AUTO: 10.4 FL (ref 8.9–12.9)
POTASSIUM SERPL-SCNC: 4.6 MMOL/L (ref 3.5–5.1)
PROT SERPL-MCNC: 7.3 G/DL (ref 6.4–8.2)
PROT UR STRIP-MCNC: NEGATIVE MG/DL
RBC # BLD AUTO: 5.24 M/UL (ref 3.8–5.2)
RBC #/AREA URNS HPF: ABNORMAL /HPF (ref 0–5)
SODIUM SERPL-SCNC: 132 MMOL/L (ref 136–145)
SP GR UR REFRACTOMETRY: 1.01 (ref 1–1.03)
URINE CULTURE IF INDICATED: ABNORMAL
UROBILINOGEN UR QL STRIP.AUTO: 0.2 EU/DL (ref 0.2–1)
WBC # BLD AUTO: 12.5 K/UL (ref 3.6–11)
WBC URNS QL MICRO: ABNORMAL /HPF (ref 0–4)

## 2025-04-20 PROCEDURE — 93010 ELECTROCARDIOGRAM REPORT: CPT | Performed by: INTERNAL MEDICINE

## 2025-04-20 PROCEDURE — 1100000000 HC RM PRIVATE

## 2025-04-20 PROCEDURE — 6360000002 HC RX W HCPCS: Performed by: STUDENT IN AN ORGANIZED HEALTH CARE EDUCATION/TRAINING PROGRAM

## 2025-04-20 PROCEDURE — 2580000003 HC RX 258: Performed by: STUDENT IN AN ORGANIZED HEALTH CARE EDUCATION/TRAINING PROGRAM

## 2025-04-20 PROCEDURE — 96375 TX/PRO/DX INJ NEW DRUG ADDON: CPT

## 2025-04-20 PROCEDURE — 85025 COMPLETE CBC W/AUTO DIFF WBC: CPT

## 2025-04-20 PROCEDURE — 83690 ASSAY OF LIPASE: CPT

## 2025-04-20 PROCEDURE — 36415 COLL VENOUS BLD VENIPUNCTURE: CPT

## 2025-04-20 PROCEDURE — 74177 CT ABD & PELVIS W/CONTRAST: CPT

## 2025-04-20 PROCEDURE — 99285 EMERGENCY DEPT VISIT HI MDM: CPT

## 2025-04-20 PROCEDURE — 6370000000 HC RX 637 (ALT 250 FOR IP): Performed by: STUDENT IN AN ORGANIZED HEALTH CARE EDUCATION/TRAINING PROGRAM

## 2025-04-20 PROCEDURE — 6360000004 HC RX CONTRAST MEDICATION: Performed by: STUDENT IN AN ORGANIZED HEALTH CARE EDUCATION/TRAINING PROGRAM

## 2025-04-20 PROCEDURE — 81001 URINALYSIS AUTO W/SCOPE: CPT

## 2025-04-20 PROCEDURE — 2500000003 HC RX 250 WO HCPCS: Performed by: STUDENT IN AN ORGANIZED HEALTH CARE EDUCATION/TRAINING PROGRAM

## 2025-04-20 PROCEDURE — 96374 THER/PROPH/DIAG INJ IV PUSH: CPT

## 2025-04-20 PROCEDURE — 80053 COMPREHEN METABOLIC PANEL: CPT

## 2025-04-20 PROCEDURE — 93005 ELECTROCARDIOGRAM TRACING: CPT | Performed by: STUDENT IN AN ORGANIZED HEALTH CARE EDUCATION/TRAINING PROGRAM

## 2025-04-20 PROCEDURE — 83605 ASSAY OF LACTIC ACID: CPT

## 2025-04-20 RX ORDER — MAGNESIUM SULFATE IN WATER 40 MG/ML
2000 INJECTION, SOLUTION INTRAVENOUS PRN
Status: DISCONTINUED | OUTPATIENT
Start: 2025-04-20 | End: 2025-04-22 | Stop reason: HOSPADM

## 2025-04-20 RX ORDER — LIDOCAINE HYDROCHLORIDE 20 MG/ML
JELLY TOPICAL ONCE
Status: COMPLETED | OUTPATIENT
Start: 2025-04-20 | End: 2025-04-20

## 2025-04-20 RX ORDER — POTASSIUM CHLORIDE 7.45 MG/ML
10 INJECTION INTRAVENOUS PRN
Status: DISCONTINUED | OUTPATIENT
Start: 2025-04-20 | End: 2025-04-22 | Stop reason: HOSPADM

## 2025-04-20 RX ORDER — ENOXAPARIN SODIUM 100 MG/ML
40 INJECTION SUBCUTANEOUS DAILY
Status: DISCONTINUED | OUTPATIENT
Start: 2025-04-21 | End: 2025-04-22 | Stop reason: HOSPADM

## 2025-04-20 RX ORDER — ACETAMINOPHEN 500 MG
500 TABLET ORAL EVERY 6 HOURS PRN
COMMUNITY

## 2025-04-20 RX ORDER — DICYCLOMINE HCL 20 MG
20 TABLET ORAL ONCE
Status: DISCONTINUED | OUTPATIENT
Start: 2025-04-20 | End: 2025-04-20

## 2025-04-20 RX ORDER — 0.9 % SODIUM CHLORIDE 0.9 %
1000 INTRAVENOUS SOLUTION INTRAVENOUS ONCE
Status: COMPLETED | OUTPATIENT
Start: 2025-04-20 | End: 2025-04-20

## 2025-04-20 RX ORDER — MORPHINE SULFATE 4 MG/ML
4 INJECTION, SOLUTION INTRAMUSCULAR; INTRAVENOUS ONCE
Refills: 0 | Status: COMPLETED | OUTPATIENT
Start: 2025-04-20 | End: 2025-04-20

## 2025-04-20 RX ORDER — HYDROMORPHONE HYDROCHLORIDE 1 MG/ML
0.25 INJECTION, SOLUTION INTRAMUSCULAR; INTRAVENOUS; SUBCUTANEOUS
Refills: 0 | Status: DISCONTINUED | OUTPATIENT
Start: 2025-04-20 | End: 2025-04-22 | Stop reason: HOSPADM

## 2025-04-20 RX ORDER — OXYMETAZOLINE HYDROCHLORIDE 0.05 G/100ML
2 SPRAY NASAL ONCE
Status: COMPLETED | OUTPATIENT
Start: 2025-04-20 | End: 2025-04-20

## 2025-04-20 RX ORDER — SODIUM CHLORIDE, SODIUM LACTATE, POTASSIUM CHLORIDE, CALCIUM CHLORIDE 600; 310; 30; 20 MG/100ML; MG/100ML; MG/100ML; MG/100ML
INJECTION, SOLUTION INTRAVENOUS CONTINUOUS
Status: DISCONTINUED | OUTPATIENT
Start: 2025-04-20 | End: 2025-04-22 | Stop reason: HOSPADM

## 2025-04-20 RX ORDER — SODIUM CHLORIDE 9 MG/ML
INJECTION, SOLUTION INTRAVENOUS PRN
Status: DISCONTINUED | OUTPATIENT
Start: 2025-04-20 | End: 2025-04-22 | Stop reason: HOSPADM

## 2025-04-20 RX ORDER — SODIUM CHLORIDE 0.9 % (FLUSH) 0.9 %
5-40 SYRINGE (ML) INJECTION EVERY 12 HOURS SCHEDULED
Status: DISCONTINUED | OUTPATIENT
Start: 2025-04-20 | End: 2025-04-22 | Stop reason: HOSPADM

## 2025-04-20 RX ORDER — ONDANSETRON 2 MG/ML
4 INJECTION INTRAMUSCULAR; INTRAVENOUS ONCE
Status: COMPLETED | OUTPATIENT
Start: 2025-04-20 | End: 2025-04-20

## 2025-04-20 RX ORDER — MORPHINE SULFATE 2 MG/ML
2 INJECTION, SOLUTION INTRAMUSCULAR; INTRAVENOUS ONCE
Status: COMPLETED | OUTPATIENT
Start: 2025-04-20 | End: 2025-04-20

## 2025-04-20 RX ORDER — POTASSIUM CHLORIDE 1500 MG/1
40 TABLET, EXTENDED RELEASE ORAL PRN
Status: DISCONTINUED | OUTPATIENT
Start: 2025-04-20 | End: 2025-04-22 | Stop reason: HOSPADM

## 2025-04-20 RX ORDER — PROCHLORPERAZINE EDISYLATE 5 MG/ML
10 INJECTION INTRAMUSCULAR; INTRAVENOUS EVERY 6 HOURS PRN
Status: DISCONTINUED | OUTPATIENT
Start: 2025-04-20 | End: 2025-04-22 | Stop reason: HOSPADM

## 2025-04-20 RX ORDER — IOPAMIDOL 755 MG/ML
100 INJECTION, SOLUTION INTRAVASCULAR
Status: COMPLETED | OUTPATIENT
Start: 2025-04-20 | End: 2025-04-20

## 2025-04-20 RX ORDER — SODIUM CHLORIDE 0.9 % (FLUSH) 0.9 %
5-40 SYRINGE (ML) INJECTION PRN
Status: DISCONTINUED | OUTPATIENT
Start: 2025-04-20 | End: 2025-04-22 | Stop reason: HOSPADM

## 2025-04-20 RX ORDER — HYDROMORPHONE HYDROCHLORIDE 1 MG/ML
0.5 INJECTION, SOLUTION INTRAMUSCULAR; INTRAVENOUS; SUBCUTANEOUS
Refills: 0 | Status: DISCONTINUED | OUTPATIENT
Start: 2025-04-20 | End: 2025-04-22 | Stop reason: HOSPADM

## 2025-04-20 RX ADMIN — MORPHINE SULFATE 2 MG: 2 INJECTION, SOLUTION INTRAMUSCULAR; INTRAVENOUS at 09:53

## 2025-04-20 RX ADMIN — SODIUM CHLORIDE, PRESERVATIVE FREE 10 ML: 5 INJECTION INTRAVENOUS at 21:22

## 2025-04-20 RX ADMIN — LIDOCAINE HYDROCHLORIDE: 20 JELLY TOPICAL at 09:52

## 2025-04-20 RX ADMIN — OXYMETAZOLINE HYDROCHLORIDE 2 SPRAY: 0.5 SPRAY NASAL at 09:51

## 2025-04-20 RX ADMIN — SODIUM CHLORIDE 1000 ML: 0.9 INJECTION, SOLUTION INTRAVENOUS at 07:01

## 2025-04-20 RX ADMIN — PROCHLORPERAZINE EDISYLATE 10 MG: 5 INJECTION INTRAMUSCULAR; INTRAVENOUS at 10:04

## 2025-04-20 RX ADMIN — ONDANSETRON 4 MG: 2 INJECTION, SOLUTION INTRAMUSCULAR; INTRAVENOUS at 06:54

## 2025-04-20 RX ADMIN — MORPHINE SULFATE 4 MG: 4 INJECTION INTRAVENOUS at 06:58

## 2025-04-20 RX ADMIN — SODIUM CHLORIDE, SODIUM LACTATE, POTASSIUM CHLORIDE, AND CALCIUM CHLORIDE: .6; .31; .03; .02 INJECTION, SOLUTION INTRAVENOUS at 09:56

## 2025-04-20 RX ADMIN — IOPAMIDOL 100 ML: 755 INJECTION, SOLUTION INTRAVENOUS at 07:11

## 2025-04-20 RX ADMIN — SODIUM CHLORIDE, SODIUM LACTATE, POTASSIUM CHLORIDE, AND CALCIUM CHLORIDE: .6; .31; .03; .02 INJECTION, SOLUTION INTRAVENOUS at 18:39

## 2025-04-20 ASSESSMENT — LIFESTYLE VARIABLES
HOW OFTEN DO YOU HAVE A DRINK CONTAINING ALCOHOL: NEVER
HOW MANY STANDARD DRINKS CONTAINING ALCOHOL DO YOU HAVE ON A TYPICAL DAY: PATIENT DOES NOT DRINK

## 2025-04-20 ASSESSMENT — PAIN DESCRIPTION - DESCRIPTORS: DESCRIPTORS: SHARP

## 2025-04-20 ASSESSMENT — PAIN SCALES - GENERAL: PAINLEVEL_OUTOF10: 8

## 2025-04-20 ASSESSMENT — PAIN DESCRIPTION - LOCATION: LOCATION: ABDOMEN

## 2025-04-20 NOTE — H&P
Acute Care Surgery Consult    Consulted by: ED  PCP: Kel Vasques MD      Assessment:       69 yo w/ hx of gastric band, hysterectomy with SBO. Transition point near terminal ileum in pelvis. Already feeling much improved.  Possibly passing some gas.  Her exam is reassuring.  Recommend nonoperative management at this time.          Plan:     Admit  NG tube placement unsuccessful, okay to hold off for now  If not nauseated, Gastrografin challenge in the morning  OOB, ambulate      HPI:      Prudence Vsaquez is a 70 y.o. female who is seen in consultation for small bowel obstruction.  She has a history of a Lap-Band at [facility] years ago and history of hysterectomy.  She has had at least 2 other bowel obstructions in the past that were treated nonoperatively.  She states that the pain at this time was more severe at 1:30 in the morning, but since the pain has subsided significantly.  She currently has no pain.  She has minimal to no nausea.  She had some diarrhea, and is possibly passing some flatus now.  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  She lives local.    Review of Systems:    A 10 point review of systems was negative aside from what is noted in the HPI.    Problem List:     Past Medical History:   Diagnosis Date    Depression     Helicobacter pylori ab+     treated    Hypercholesteremia     Hypertension     Lumbar herniated disc     Obesity surgery status 2009    s/p lap band Dr. Jaffe [facility]    Restless legs syndrome     RLS (restless legs syndrome)     Unspecified hypothyroidism 12/31/2012    Vitamin D deficiency 2/19/2021     Past Surgical History:   Procedure Laterality Date    CARPAL TUNNEL RELEASE      b/l    CATARACT REMOVAL Bilateral June and July 2014    CERVICAL DISCECTOMY      c5/c6 2006    GYN  Jan 2014    pelvic organ prolapse surgery    GYN      tubal ligation    HEENT      tonsillectomy    LAP, CHANGE ADJUST AWILDA BAND      ORTHOPEDIC SURGERY      x2 trigger fingers right hand

## 2025-04-20 NOTE — ED NOTES
This RN attempted to place 16FR NG tube unsuccessfully twice. Therese RN attempted NG tube placement, unsuccessfully twice. JENNI Romo attempted NG tube placement unsuccessfully once. Patient requesting break from placement at this time. MD Toussaint made aware.

## 2025-04-20 NOTE — ED NOTES
Rounded on patient. NAD. Physiological needs met. Patient resting in stretcher comfortably. Call bell within reach. Patient informed need of urine sample at this time. Patient remains on continuous monitoring x3.

## 2025-04-20 NOTE — ED NOTES
Bedside report given to JENNI Goins by JENNI Chamorro. Nurse was informed of reason for arrival, vitals, labs, medications, orders, procedures, results, cardiac rhythm, any outstanding and pending orders and plan of care. Opportunity for questions were provided for receiving RN at this time.

## 2025-04-20 NOTE — ED PROVIDER NOTES
placement.      Disposition Considerations (Tests not done, Shared Decision Making, Pt Expectation of Test or Tx.):      FINAL IMPRESSION     1. Small bowel obstruction (HCC)    2. Cyst of left ovary          DISPOSITION/PLAN   DISPOSITION                 Discharge Note: The patient is stable for discharge home. The signs, symptoms, diagnosis, and discharge instructions have been discussed, understanding conveyed, and agreed upon. The patient is to follow up as recommended or return to ER should their symptoms worsen.      PATIENT REFERRED TO:  No follow-up provider specified.       DISCHARGE MEDICATIONS:     Medication List        ASK your doctor about these medications      acetaminophen 80 MG chewable tablet  Commonly known as: TYLENOL     atorvastatin 20 MG tablet  Commonly known as: LIPITOR  TAKE 1 TABLET BY MOUTH DAILY     cetirizine 10 MG tablet  Commonly known as: ZYRTEC     clonazePAM 0.5 MG tablet  Commonly known as: KLONOPIN  TAKE 1 TABLET BY MOUTH ONCE NIGHTLY AS NEEDED FOR INSOMNIA     escitalopram 20 MG tablet  Commonly known as: LEXAPRO  TAKE ONE TABLET BY MOUTH DAILY     estradiol 0.5 MG tablet  Commonly known as: ESTRACE     levothyroxine 100 MCG tablet  Commonly known as: SYNTHROID  TAKE ONE TABLET BY MOUTH EVERY MORNING BEFORE BREAKFAST     losartan 100 MG tablet  Commonly known as: COZAAR  TAKE 1 TABLET BY MOUTH DAILY     Magnesium Glycinate 100 MG Caps     therapeutic multivitamin-minerals tablet     Vitamin D3 50 MCG (2000 UT) Tabs     zolpidem 10 MG tablet  Commonly known as: AMBIEN  TAKE ONE TABLET BY MOUTH ONCE NIGHTLY AS NEEDED FOR SLEEP FOR UP TO 30 DAYS *MAX DAILY DOSE 10MG*                DISCONTINUED MEDICATIONS:  Current Discharge Medication List          I am the Primary Clinician of Record.   Jay Madsen DO (electronically signed)      (Please note that parts of this dictation were completed with voice recognition software. Quite often unanticipated grammatical, syntax,

## 2025-04-21 ENCOUNTER — APPOINTMENT (OUTPATIENT)
Facility: HOSPITAL | Age: 70
DRG: 390 | End: 2025-04-21
Payer: MEDICARE

## 2025-04-21 LAB
BASOPHILS # BLD: 0.03 K/UL (ref 0–0.1)
BASOPHILS NFR BLD: 0.5 % (ref 0–1)
DIFFERENTIAL METHOD BLD: NORMAL
EOSINOPHIL # BLD: 0.15 K/UL (ref 0–0.4)
EOSINOPHIL NFR BLD: 2.7 % (ref 0–7)
ERYTHROCYTE [DISTWIDTH] IN BLOOD BY AUTOMATED COUNT: 13.9 % (ref 11.5–14.5)
GLUCOSE BLD STRIP.AUTO-MCNC: 71 MG/DL (ref 65–117)
GLUCOSE BLD STRIP.AUTO-MCNC: 92 MG/DL (ref 65–117)
GLUCOSE BLD STRIP.AUTO-MCNC: 98 MG/DL (ref 65–117)
HCT VFR BLD AUTO: 37.6 % (ref 35–47)
HGB BLD-MCNC: 12.2 G/DL (ref 11.5–16)
IMM GRANULOCYTES # BLD AUTO: 0.01 K/UL (ref 0–0.04)
IMM GRANULOCYTES NFR BLD AUTO: 0.2 % (ref 0–0.5)
LYMPHOCYTES # BLD: 1.58 K/UL (ref 0.8–3.5)
LYMPHOCYTES NFR BLD: 28.7 % (ref 12–49)
MCH RBC QN AUTO: 30.5 PG (ref 26–34)
MCHC RBC AUTO-ENTMCNC: 32.4 G/DL (ref 30–36.5)
MCV RBC AUTO: 94 FL (ref 80–99)
MONOCYTES # BLD: 0.44 K/UL (ref 0–1)
MONOCYTES NFR BLD: 8 % (ref 5–13)
NEUTS SEG # BLD: 3.3 K/UL (ref 1.8–8)
NEUTS SEG NFR BLD: 59.9 % (ref 32–75)
NRBC # BLD: 0 K/UL (ref 0–0.01)
NRBC BLD-RTO: 0 PER 100 WBC
PLATELET # BLD AUTO: 199 K/UL (ref 150–400)
PMV BLD AUTO: 10.9 FL (ref 8.9–12.9)
RBC # BLD AUTO: 4 M/UL (ref 3.8–5.2)
SERVICE CMNT-IMP: NORMAL
WBC # BLD AUTO: 5.5 K/UL (ref 3.6–11)

## 2025-04-21 PROCEDURE — 1100000000 HC RM PRIVATE

## 2025-04-21 PROCEDURE — 2580000003 HC RX 258: Performed by: STUDENT IN AN ORGANIZED HEALTH CARE EDUCATION/TRAINING PROGRAM

## 2025-04-21 PROCEDURE — 74019 RADEX ABDOMEN 2 VIEWS: CPT

## 2025-04-21 PROCEDURE — 6360000004 HC RX CONTRAST MEDICATION: Performed by: SURGERY

## 2025-04-21 PROCEDURE — 82962 GLUCOSE BLOOD TEST: CPT

## 2025-04-21 PROCEDURE — 99231 SBSQ HOSP IP/OBS SF/LOW 25: CPT | Performed by: NURSE PRACTITIONER

## 2025-04-21 PROCEDURE — 85025 COMPLETE CBC W/AUTO DIFF WBC: CPT

## 2025-04-21 PROCEDURE — 6360000002 HC RX W HCPCS: Performed by: STUDENT IN AN ORGANIZED HEALTH CARE EDUCATION/TRAINING PROGRAM

## 2025-04-21 PROCEDURE — 36415 COLL VENOUS BLD VENIPUNCTURE: CPT

## 2025-04-21 RX ORDER — GLUCAGON 1 MG/ML
1 KIT INJECTION PRN
Status: DISCONTINUED | OUTPATIENT
Start: 2025-04-21 | End: 2025-04-22 | Stop reason: HOSPADM

## 2025-04-21 RX ORDER — INSULIN LISPRO 100 [IU]/ML
0-8 INJECTION, SOLUTION INTRAVENOUS; SUBCUTANEOUS
Status: DISCONTINUED | OUTPATIENT
Start: 2025-04-21 | End: 2025-04-22 | Stop reason: HOSPADM

## 2025-04-21 RX ORDER — DIATRIZOATE MEGLUMINE AND DIATRIZOATE SODIUM 660; 100 MG/ML; MG/ML
80 SOLUTION ORAL; RECTAL
Status: COMPLETED | OUTPATIENT
Start: 2025-04-21 | End: 2025-04-21

## 2025-04-21 RX ORDER — DEXTROSE MONOHYDRATE 100 MG/ML
INJECTION, SOLUTION INTRAVENOUS CONTINUOUS PRN
Status: DISCONTINUED | OUTPATIENT
Start: 2025-04-21 | End: 2025-04-22 | Stop reason: HOSPADM

## 2025-04-21 RX ADMIN — SODIUM CHLORIDE, SODIUM LACTATE, POTASSIUM CHLORIDE, AND CALCIUM CHLORIDE: .6; .31; .03; .02 INJECTION, SOLUTION INTRAVENOUS at 14:12

## 2025-04-21 RX ADMIN — ENOXAPARIN SODIUM 40 MG: 100 INJECTION SUBCUTANEOUS at 09:40

## 2025-04-21 RX ADMIN — DIATRIZOATE MEGLUMINE AND DIATRIZOATE SODIUM 80 ML: 660; 100 LIQUID ORAL; RECTAL at 09:40

## 2025-04-21 NOTE — PROGRESS NOTES
..End of Shift Note    Bedside shift change report given to JENNI Wood (oncoming nurse) by Tulio Velásquez RN (offgoing nurse).  Report included the following information SBAR    Shift worked:  0700 - 1900     Shift summary and any significant changes:    Pt. Tolerated all medication w/o issue.  No c/o pain or distress.  Patient has had a bowel movement each hour post gastrografin.       Concerns for physician to address: No     Zone phone for oncoming shift:  No       Activity:  Level of Assistance: Minimal assist, patient does 75% or more  Number times ambulated in hallways past shift: 0  Number of times OOB to chair past shift: 3    Cardiac:   Cardiac Monitoring: No      Cardiac Rhythm: Sinus rhythm    Access:  Current line(s): PIV     Genitourinary:        Respiratory:   O2 Device: None (Room air)  Chronic home O2 use?: NO  Incentive spirometer at bedside: NO    GI:     Current diet:  ADULT DIET; Full Liquid  Passing flatus: YES    Pain Management:   Patient states pain is manageable on current regimen: YES    Skin:  Kamari Scale Score: 22  Interventions: Wound Offloading (Prevention Methods): Repositioning    Patient Safety:  Fall Risk: Nursing Judgement-Fall Risk High(Add Comments): Yes  Fall Risk Interventions  Nursing Judgement-Fall Risk High(Add Comments): Yes  Toilet Every 2 Hours-In Advance of Need: Yes  Hourly Visual Checks: Awake  Fall Visual Posted: Fall sign posted  Room Door Open: Deferred to promote rest  Alarm On: Bed  Patient Moved Closer to Nursing Station: No    Active Consults:   IP CONSULT TO GENERAL SURGERY  IP CONSULT TO VASCULAR ACCESS TEAM    Length of Stay:  Expected LOS: 2  Actual LOS: 1    Tulio Velásquez RN

## 2025-04-21 NOTE — CARE COORDINATION
Care Management Initial Assessment       RUR:8%  Readmission? No  1st IM letter given? Yes - 4/21/2025  1st  letter given: No    Met with patient - lives with son, lino and son in law- independent with ADL's, IADL\"s and driving - uses no DME - uses [facility] in [place] - no hx of HH or rehab- family to transport upon discharge. RICO VASQUEZ, MSW  [phone]       04/21/25 [id number]   Service Assessment   Patient Orientation Alert and Oriented   Cognition Alert   History Provided By Patient   Primary Caregiver Self   Support Systems Children;Family Members   Patient's Healthcare Decision Maker is: Legal Next of Kin   PCP Verified by CM Yes   Last Visit to PCP Within last 3 months   Prior Functional Level Independent in ADLs/IADLs   Current Functional Level Independent in ADLs/IADLs   Can patient return to prior living arrangement Yes   Family able to assist with home care needs: Yes   Would you like for me to discuss the discharge plan with any other family members/significant others, and if so, who? No   Financial Resources Medicare;Other (Comment)  (Medicare and [facility])   Social/Functional History   Lives With Family;Daughter;Son   Type of Home House   Home Layout Two level  (3 JAZILE and 13 to second floor)   Active  Yes   Discharge Planning   Patient expects to be discharged to: House   Condition of Participation: Discharge Planning   The Plan for Transition of Care is related to the following treatment goals: Home with family and outpatient follow up   The Patient and/or Patient Representative was provided with a Choice of Provider? Patient   The Patient and/Or Patient Representative agree with the Discharge Plan? Yes

## 2025-04-21 NOTE — PROGRESS NOTES
End of Shift Note    Bedside shift change report given to ED,RN (oncoming nurse) by TANYA Wang RN (offgoing nurse).  Report included the following information SBAR, Intake/Output, and MAR    Shift worked:  7pm-7am     Shift summary and any significant changes:     No complain of pain,nausea/vomit in this shift. Ambulating to the bathroom and voiding. Stable vital signs. Otherwise uneventful.     Concerns for physician to address:       Zone phone for oncoming shift:              TANYA Wagn RN

## 2025-04-21 NOTE — PLAN OF CARE
Problem: Discharge Planning  Goal: Discharge to home or other facility with appropriate resources  4/21/2025 1032 by Tulio Velásquez RN  Outcome: Progressing  4/20/2025 2330 by TANYA Wang RN  Outcome: Progressing     Problem: Safety - Adult  Goal: Free from fall injury  4/21/2025 1032 by Tulio Velásquez RN  Outcome: Progressing  4/20/2025 2330 by TANYA Wang RN  Outcome: Progressing     Problem: Gastrointestinal - Adult  Goal: Minimal or absence of nausea and vomiting  Outcome: Progressing  Goal: Maintains or returns to baseline bowel function  Outcome: Progressing  Goal: Maintains adequate nutritional intake  Outcome: Progressing  Goal: Establish and maintain optimal ostomy function  Outcome: Progressing

## 2025-04-21 NOTE — PROGRESS NOTES
Surgery NP Progress Note    Prudence Vasquez  [id number]  female  70 y.o.  1955    Admitted for Principal Problem:    SBO (small bowel obstruction) (HCC)  Resolved Problems:    * No resolved hospital problems. *    Pt seen with Dr. Carpenter    Assessment:     Patient with SBO, recurrent. Improving    Plan/Recommendations/Medical Decision Making:     - Mobilize with nursing and OOB to chair for meals  - NPO except gastrografin challenge   - Pain management- Continue current pain control methods.   - VTE Prophylaxis: Lovenox   - D/C planning tomorrow if clears gastrografin and tolerating a diet.     Subjective:     Patient feeling better, almost back to normal. Passing flatus. No bowel movement yet.     Objective:     Blood pressure 125/72, pulse 66, temperature 98.2 °F (36.8 °C), temperature source Oral, resp. rate 18, height 1.6 m (5' 3\"), weight 79.3 kg (174 lb 14.4 oz), SpO2 100%.    Temp (24hrs), Av.9 °F (36.6 °C), Min:97.3 °F (36.3 °C), Max:98.2 °F (36.8 °C)      Pt resting in bed NAD   SCDs for mechanical DVT proph while in bed   Abd soft and non-tender.     Body mass index is 30.98 kg/m².     Reference: BMI greater than 30 is classified as obesity and greater than 40 is classified as morbid obesity.       Sil Abarca, REBECCA - NP   MSN, APRN, FNP-C, CWOCN-AP, RNAS-C    25

## 2025-04-21 NOTE — PLAN OF CARE
Problem: Discharge Planning  Goal: Discharge to home or other facility with appropriate resources  Outcome: Progressing     Problem: Safety - Adult  Goal: Free from fall injury  4/20/2025 2330 by TANYA Wang, RN  Outcome: Progressing  4/20/2025 1133 by Wan Goins, RN  Outcome: Progressing

## 2025-04-21 NOTE — PROGRESS NOTES
Gastrografin Challenge in Small Bowel Obstructions    Purpose:   Gastrografin (diatrizoate meglumine-sodium) is a contrast media which is used in small bowel obstructions.     It does two things for the patient:  First, it DIAGNOSTIC, meaning it provides visibility on X-Ray which helps determine the extent or improvement of the obstruction.    Second, it is THERAPUETIC, meaning it helps to promote bowel function as an osmotic laxative, thereby relieving the obstruction.       Process:  Surgeon will order the Gastrografin. This is to be treated like any other medication and ADMINISTERED AS ORDERED regardless of imaging/X-ray orders.   The contrast should be obtained from the [facility] or PHARMACY. Do not call radiology for it. Remember, treat this as a medication, not just contrast.   Mix the medication as noted in the instructions and give to the patient either PO or via NG Tube (if they have one).   If the patient has an NGT, clamp it for 4 hours following administration and then return to suction after four hours.   Xrays will be ordered by the surgeon. These may be 4 hours after the Gastrografin or the next day depending on various patient factors. DO NOT WAIT FOR AN XRAY ORDER TO GIVE THE GASTROGRAFIN. Often times the Xray order will not be placed at the same time as the Gastrografin and this is intentional.

## 2025-04-21 NOTE — PROGRESS NOTES
End of Shift Note    Bedside shift change report given to DEREK (oncoming nurse) by Wan Goins RN .        Shift worked:  DAYS   Shift summary and any significant changes:     -NPO   -NGTUBE UNSUCCESSFUL.. MD AWARE       Concerns for physician to address:  NONE   Zone phone for oncoming shift:   [phone]     Patient Information  Prudence Vasquez  70 y.o.  4/20/2025  6:24 AM by Norbert Toussaint MD. Prudence Vasquez was admitted from [facility]    Problem List  Patient Active Problem List    Diagnosis Date Noted    SBO (small bowel obstruction) (HCC) 04/20/2025    Vitamin D deficiency 02/19/2021    Dry eyes 09/18/2015    Hypothyroidism due to acquired atrophy of thyroid 12/31/2012    Pure hypercholesterolemia 08/18/2009    Essential hypertension, benign 08/18/2009    RLS (restless legs syndrome) 08/18/2009     Past Medical History:   Diagnosis Date    Depression     Helicobacter pylori ab+     treated    Hypercholesteremia     Hypertension     Lumbar herniated disc     Obesity surgery status 2009    s/p lap band Dr. Jaffe [facility]    Restless legs syndrome     RLS (restless legs syndrome)     Unspecified hypothyroidism 12/31/2012    Vitamin D deficiency 2/19/2021     Access:   Current line(s): PIV    Respiratory:   O2 Device: None (Room air)    GI:     Current diet:  Diet NPO  Tolerating current diet: Yes    Pain Management:   Patient states pain is manageable on current regimen: yes    Skin:  Kamari Scale Score: 20  Interventions: N/A  Pressure injury: no    Patient Safety:  Fall Score: Kelly Total Score: 20  Interventions: bed alarm  Self-release roll belt: No  Dexterity to release roll belt: N/A   (must document dexterity  here by stating Yes or No here, otherwise this is a restraint and must follow restraint documentation policy.)      Active Consults:  IP CONSULT TO GENERAL SURGERY    Length of Stay:  Expected LOS: 3  Actual LOS: 0    Wan Goins, RN

## 2025-04-22 VITALS
OXYGEN SATURATION: 92 % | HEIGHT: 63 IN | HEART RATE: 65 BPM | SYSTOLIC BLOOD PRESSURE: 150 MMHG | RESPIRATION RATE: 15 BRPM | TEMPERATURE: 98.1 F | WEIGHT: 175.1 LBS | DIASTOLIC BLOOD PRESSURE: 76 MMHG | BODY MASS INDEX: 31.02 KG/M2

## 2025-04-22 LAB
EST. AVERAGE GLUCOSE BLD GHB EST-MCNC: 105 MG/DL
GLUCOSE BLD STRIP.AUTO-MCNC: 83 MG/DL (ref 65–117)
GLUCOSE BLD STRIP.AUTO-MCNC: 91 MG/DL (ref 65–117)
HBA1C MFR BLD: 5.3 % (ref 4–5.6)
SERVICE CMNT-IMP: NORMAL
SERVICE CMNT-IMP: NORMAL

## 2025-04-22 PROCEDURE — 6360000002 HC RX W HCPCS: Performed by: STUDENT IN AN ORGANIZED HEALTH CARE EDUCATION/TRAINING PROGRAM

## 2025-04-22 PROCEDURE — 2580000003 HC RX 258: Performed by: SURGERY

## 2025-04-22 PROCEDURE — 99231 SBSQ HOSP IP/OBS SF/LOW 25: CPT | Performed by: NURSE PRACTITIONER

## 2025-04-22 PROCEDURE — 82962 GLUCOSE BLOOD TEST: CPT

## 2025-04-22 PROCEDURE — 83036 HEMOGLOBIN GLYCOSYLATED A1C: CPT

## 2025-04-22 PROCEDURE — 36415 COLL VENOUS BLD VENIPUNCTURE: CPT

## 2025-04-22 PROCEDURE — 2500000003 HC RX 250 WO HCPCS: Performed by: STUDENT IN AN ORGANIZED HEALTH CARE EDUCATION/TRAINING PROGRAM

## 2025-04-22 RX ADMIN — ENOXAPARIN SODIUM 40 MG: 100 INJECTION SUBCUTANEOUS at 09:06

## 2025-04-22 RX ADMIN — SODIUM CHLORIDE, PRESERVATIVE FREE 10 ML: 5 INJECTION INTRAVENOUS at 09:10

## 2025-04-22 RX ADMIN — SODIUM CHLORIDE, SODIUM LACTATE, POTASSIUM CHLORIDE, AND CALCIUM CHLORIDE: .6; .31; .03; .02 INJECTION, SOLUTION INTRAVENOUS at 05:22

## 2025-04-22 ASSESSMENT — PAIN SCALES - GENERAL: PAINLEVEL_OUTOF10: 0

## 2025-04-22 NOTE — CARE COORDINATION
Patient is cleared from CM standpoint       04/22/25 1052   Discharge Planning   Patient expects to be discharged to: House   Services At/After Discharge   Confirm Follow Up Transport Family   Condition of Participation: Discharge Planning   The Plan for Transition of Care is related to the following treatment goals: Home with family and PCP/Specialist follow-up   The Patient and/or Patient Representative was provided with a Choice of Provider? Patient   The Patient and/Or Patient Representative agree with the Discharge Plan? Yes

## 2025-04-22 NOTE — DISCHARGE SUMMARY
Discharge Summary    Patient ID:  Prudence Vasquez  [id number]  female  70 y.o.  1955    Admit date: 4/20/2025    Discharge date: 4/22/2025    Admitting Physician: Norbert Toussaint MD     Consulting Physician(s):   Treatment Team:   Leobardo Carpenter MD Moustafa, Moustafa K, Shawanda Hines, Lita Rosenbaum, Israel Layton, Sirisha Wen LPN                         HPI:  Pt is a 70 y.o. female who presents at this time with SBO requiring acute care monitoring and/or treatment.    Problem List:   Patient Active Problem List   Diagnosis    Hypothyroidism due to acquired atrophy of thyroid    Pure hypercholesterolemia    Essential hypertension, benign    Vitamin D deficiency    Dry eyes    RLS (restless legs syndrome)    SBO (small bowel obstruction) ([facility])        Hospital Course:  Patient was managed conservatively with bowel rest and improved as expected. On the day of discharge, patient was able to tolerate a diet.    Pain Management:  Oxycodone    Transfusions:    Number of units banked PRBCs =   none     Activity: Patient mobilized with nursing and was found to be safe and steady with ambulation.     Discharged to: Home     Condition on Discharge: Stable     Discharge instructions:    - Take medications as prescribed  - Diet as tolerated   - Discharge activity:    - Activity as tolerated    - Ambulate several times a day   - Do not drive if taking opioid pain medications    Allergies:    Allergies   Allergen Reactions    Cefdinir Nausea And Vomiting    Sulfamethoxazole-Trimethoprim Nausea And Vomiting    Aspirin Other (See Comments)     abd pain and cramping    Clindamycin Other (See Comments)     Abdominal pain    Ibuprofen Other (See Comments)     abd pain and cramping    Lisinopril Cough    Nsaids Other (See Comments)     Pain and burning    Penicillins Rash    Tetracycline Nausea And Vomiting              -DISCHARGE MEDICATION LIST        Medication List        CONTINUE taking these

## 2025-07-07 ENCOUNTER — OFFICE VISIT (OUTPATIENT)
Age: 70
End: 2025-07-07
Payer: MEDICARE

## 2025-07-07 VITALS
OXYGEN SATURATION: 98 % | HEIGHT: 64 IN | BODY MASS INDEX: 29.02 KG/M2 | DIASTOLIC BLOOD PRESSURE: 76 MMHG | SYSTOLIC BLOOD PRESSURE: 110 MMHG | HEART RATE: 75 BPM | WEIGHT: 170 LBS | TEMPERATURE: 98.7 F | RESPIRATION RATE: 20 BRPM

## 2025-07-07 DIAGNOSIS — F41.9 ANXIETY: ICD-10-CM

## 2025-07-07 DIAGNOSIS — F10.20 UNCOMPLICATED ALCOHOL DEPENDENCE (HCC): Primary | ICD-10-CM

## 2025-07-07 PROCEDURE — 99213 OFFICE O/P EST LOW 20 MIN: CPT | Performed by: INTERNAL MEDICINE

## 2025-07-07 PROCEDURE — 1090F PRES/ABSN URINE INCON ASSESS: CPT | Performed by: INTERNAL MEDICINE

## 2025-07-07 PROCEDURE — G8399 PT W/DXA RESULTS DOCUMENT: HCPCS | Performed by: INTERNAL MEDICINE

## 2025-07-07 PROCEDURE — 1036F TOBACCO NON-USER: CPT | Performed by: INTERNAL MEDICINE

## 2025-07-07 PROCEDURE — 3017F COLORECTAL CA SCREEN DOC REV: CPT | Performed by: INTERNAL MEDICINE

## 2025-07-07 PROCEDURE — G8427 DOCREV CUR MEDS BY ELIG CLIN: HCPCS | Performed by: INTERNAL MEDICINE

## 2025-07-07 PROCEDURE — G8417 CALC BMI ABV UP PARAM F/U: HCPCS | Performed by: INTERNAL MEDICINE

## 2025-07-07 PROCEDURE — 1159F MED LIST DOCD IN RCRD: CPT | Performed by: INTERNAL MEDICINE

## 2025-07-07 PROCEDURE — 1123F ACP DISCUSS/DSCN MKR DOCD: CPT | Performed by: INTERNAL MEDICINE

## 2025-07-07 PROCEDURE — 3078F DIAST BP <80 MM HG: CPT | Performed by: INTERNAL MEDICINE

## 2025-07-07 PROCEDURE — 3074F SYST BP LT 130 MM HG: CPT | Performed by: INTERNAL MEDICINE

## 2025-07-07 RX ORDER — ESCITALOPRAM OXALATE 5 MG/1
20 TABLET ORAL
COMMUNITY

## 2025-07-07 RX ORDER — ATORVASTATIN CALCIUM 10 MG/1
20 TABLET, FILM COATED ORAL
COMMUNITY

## 2025-07-07 RX ORDER — ERGOCALCIFEROL 1.25 MG/1
CAPSULE ORAL
COMMUNITY

## 2025-07-07 RX ORDER — HYDROXYZINE HYDROCHLORIDE 25 MG/1
25 TABLET, FILM COATED ORAL 4 TIMES DAILY PRN
Qty: 60 TABLET | Refills: 1 | Status: SHIPPED | OUTPATIENT
Start: 2025-07-07 | End: 2025-08-06

## 2025-07-07 RX ORDER — LOSARTAN POTASSIUM 100 MG/1
100 TABLET ORAL
COMMUNITY

## 2025-07-07 RX ORDER — LEVOTHYROXINE SODIUM 100 UG/1
TABLET ORAL
COMMUNITY

## 2025-07-07 RX ORDER — ESTRADIOL 0.5 MG/1
0.5 TABLET ORAL
COMMUNITY

## 2025-07-07 ASSESSMENT — PATIENT HEALTH QUESTIONNAIRE - PHQ9
SUM OF ALL RESPONSES TO PHQ QUESTIONS 1-9: 1
2. FEELING DOWN, DEPRESSED OR HOPELESS: NOT AT ALL
1. LITTLE INTEREST OR PLEASURE IN DOING THINGS: SEVERAL DAYS
SUM OF ALL RESPONSES TO PHQ QUESTIONS 1-9: 1

## 2025-07-07 NOTE — PROGRESS NOTES
Have you been to the ER, urgent care clinic since your last visit?  Hospitalized since your last visit?   Yes; see encounters.     Have you seen or consulted any other health care providers outside our system since your last visit?   No.              
  Hemoglobin A1C   Date Value Ref Range Status   04/22/2025 5.3 4.0 - 5.6 % Final     Comment:     (NOTE)  HbA1C Interpretive Ranges  <5.7              Normal  5.7 - 6.4         Consider Prediabetes  >6.5              Consider Diabetes          Review of Systems     Physical Exam  Constitutional:       Appearance: Normal appearance.   HENT:      Head: Normocephalic and atraumatic.      Right Ear: Tympanic membrane normal.      Left Ear: Tympanic membrane normal.      Nose: Nose normal.      Mouth/Throat:      Mouth: Mucous membranes are moist.   Eyes:      Pupils: Pupils are equal, round, and reactive to light.   Cardiovascular:      Rate and Rhythm: Normal rate and regular rhythm.      Pulses: Normal pulses.      Heart sounds: Normal heart sounds.   Pulmonary:      Effort: Pulmonary effort is normal.   Abdominal:      General: Abdomen is flat.   Musculoskeletal:         General: Normal range of motion.      Cervical back: Normal range of motion and neck supple.   Skin:     General: Skin is warm.   Neurological:      General: No focal deficit present.      Mental Status: She is alert.   Psychiatric:         Mood and Affect: Mood normal.         Thought Content: Thought content normal.         Judgment: Judgment normal.        ASSESSMENT and PLAN  There are no diagnoses linked to this encounter.   Prudence was seen today for alcohol problem.    Diagnoses and all orders for this visit:    Uncomplicated alcohol dependence (HCC)  -     [facility] - Referral for Social Determinants of Health   Advised to see substance abuse provider at [facility]-consider naltrexone  Anxiety   Add atarax 25 mg qid prn anxiety   Continue lexapro 20 gm qd  Other orders  -     hydrOXYzine HCl (ATARAX) 25 MG tablet; Take 1 tablet by mouth 4 times daily as needed for Itching or Anxiety  Rtc as scheduled     Kel Vasques MD

## 2025-07-14 ENCOUNTER — HOSPITAL ENCOUNTER (OUTPATIENT)
Facility: HOSPITAL | Age: 70
Discharge: HOME | End: 2025-07-17
Attending: INTERNAL MEDICINE
Payer: MEDICARE

## 2025-07-14 DIAGNOSIS — Z78.0 MENOPAUSE: ICD-10-CM

## 2025-07-14 PROCEDURE — 77080 DXA BONE DENSITY AXIAL: CPT

## 2025-08-15 ASSESSMENT — SLEEP AND FATIGUE QUESTIONNAIRES
HOW LIKELY ARE YOU TO NOD OFF OR FALL ASLEEP WHILE SITTING INACTIVE IN A PUBLIC PLACE: SLIGHT CHANCE OF DOZING
HOW LIKELY ARE YOU TO NOD OFF OR FALL ASLEEP WHILE WATCHING TV: MODERATE CHANCE OF DOZING
DO YOU HAVE DIFFICULTY OPERATING A MOTOR VEHICLE FOR SHORT DISTANCES (LESS THAN 100 MILES) BECAUSE YOU BECOME SLEEPY: NO
ESS TOTAL SCORE: 9
HAS YOUR RELATIONSHIP WITH FAMILY, FRIENDS OR WORK COLLEAGUES BEEN AFFECTED BECAUSE YOU ARE SLEEPY OR TIRED: YES, A LITTLE
HOW LIKELY ARE YOU TO NOD OFF OR FALL ASLEEP IN A CAR, WHILE STOPPED FOR A FEW MINUTES IN TRAFFIC: WOULD NEVER DOZE
HOW LIKELY ARE YOU TO NOD OFF OR FALL ASLEEP WHILE LYING DOWN TO REST IN THE AFTERNOON WHEN CIRCUMSTANCES PERMIT: HIGH CHANCE OF DOZING
HAS YOUR MOOD BEEN AFFECTED BECAUSE YOU ARE SLEEPY OR TIRED: YES, MODERATE
HOW LIKELY ARE YOU TO NOD OFF OR FALL ASLEEP WHILE SITTING AND TALKING TO SOMEONE: WOULD NEVER DOZE
DO YOU HAVE DIFFICULTY WATCHING A MOVIE OR VIDEO BECAUSE YOU BECOME SLEEPY OR TIRED: YES, A LITTLE
DO YOU HAVE DIFFICULTY OPERATING A MOTOR VEHICLE FOR LONG DISTANCES (GREATER THAN 100 MILES) BECAUSE YOU BECOME SLEEPY: NO
HOW LIKELY ARE YOU TO NOD OFF OR FALL ASLEEP WHILE SITTING AND READING: SLIGHT CHANCE OF DOZING
DO YOU HAVE DIFFICULTY VISITING YOUR FAMILY OR FRIENDS IN THEIR HOME BECAUSE YOU BECOME SLEEPY OR TIRED: NO
DO YOU HAVE DIFFICULTY BEING AS ACTIVE AS YOU WANT TO BE IN THE MORNING BECAUSE YOU ARE SLEEPY OR TIRED: YES, MODERATE
HOW LIKELY ARE YOU TO NOD OFF OR FALL ASLEEP WHILE SITTING AND TALKING TO SOMEONE: WOULD NEVER DOZE
HOW LIKELY ARE YOU TO NOD OFF OR FALL ASLEEP WHILE SITTING AND READING: SLIGHT CHANCE OF DOZING
HOW LIKELY ARE YOU TO NOD OFF OR FALL ASLEEP IN A CAR, WHILE STOPPED FOR A FEW MINUTES IN TRAFFIC: WOULD NEVER DOZE
HOW LIKELY ARE YOU TO NOD OFF OR FALL ASLEEP WHEN YOU ARE A PASSENGER IN A CAR FOR AN HOUR WITHOUT A BREAK: SLIGHT CHANCE OF DOZING
HOW LIKELY ARE YOU TO NOD OFF OR FALL ASLEEP WHILE SITTING QUIETLY AFTER LUNCH WITHOUT ALCOHOL: SLIGHT CHANCE OF DOZING
DO YOU GENERALLY HAVE DIFFICULTY REMEMBERING THINGS BECAUSE YOU ARE SLEEPY OR TIRED: YES, A LITTLE
HOW LIKELY ARE YOU TO NOD OFF OR FALL ASLEEP WHILE SITTING INACTIVE IN A PUBLIC PLACE: SLIGHT CHANCE OF DOZING
FOSQ SCORE: 15.5
DO YOU HAVE DIFFICULTY BEING AS ACTIVE AS YOU WANT TO BE IN THE EVENING BECAUSE YOU ARE SLEEPY OR TIRED: YES, LITTLE
HOW LIKELY ARE YOU TO NOD OFF OR FALL ASLEEP WHILE LYING DOWN TO REST IN THE AFTERNOON WHEN CIRCUMSTANCES PERMIT: HIGH CHANCE OF DOZING
HOW LIKELY ARE YOU TO NOD OFF OR FALL ASLEEP WHILE WATCHING TV: MODERATE CHANCE OF DOZING
DO YOU HAVE DIFFICULTY CONCENTRATING ON THE THINGS YOU DO BECAUSE YOU ARE SLEEPY OR TIRED: YES, A LITTLE
HOW LIKELY ARE YOU TO NOD OFF OR FALL ASLEEP WHEN YOU ARE A PASSENGER IN A CAR FOR AN HOUR WITHOUT A BREAK: SLIGHT CHANCE OF DOZING
HOW LIKELY ARE YOU TO NOD OFF OR FALL ASLEEP WHILE SITTING QUIETLY AFTER LUNCH WITHOUT ALCOHOL: SLIGHT CHANCE OF DOZING

## 2025-08-18 ENCOUNTER — OFFICE VISIT (OUTPATIENT)
Age: 70
End: 2025-08-18
Payer: MEDICARE

## 2025-08-18 VITALS
TEMPERATURE: 97.7 F | BODY MASS INDEX: 30.11 KG/M2 | HEART RATE: 71 BPM | OXYGEN SATURATION: 95 % | WEIGHT: 176.4 LBS | HEIGHT: 64 IN | SYSTOLIC BLOOD PRESSURE: 123 MMHG | DIASTOLIC BLOOD PRESSURE: 65 MMHG

## 2025-08-18 DIAGNOSIS — G47.33 OBSTRUCTIVE SLEEP APNEA (ADULT) (PEDIATRIC): Primary | ICD-10-CM

## 2025-08-18 DIAGNOSIS — I10 ESSENTIAL HYPERTENSION, BENIGN: ICD-10-CM

## 2025-08-18 PROCEDURE — G8427 DOCREV CUR MEDS BY ELIG CLIN: HCPCS | Performed by: INTERNAL MEDICINE

## 2025-08-18 PROCEDURE — 1090F PRES/ABSN URINE INCON ASSESS: CPT | Performed by: INTERNAL MEDICINE

## 2025-08-18 PROCEDURE — G8399 PT W/DXA RESULTS DOCUMENT: HCPCS | Performed by: INTERNAL MEDICINE

## 2025-08-18 PROCEDURE — 1159F MED LIST DOCD IN RCRD: CPT | Performed by: INTERNAL MEDICINE

## 2025-08-18 PROCEDURE — 1036F TOBACCO NON-USER: CPT | Performed by: INTERNAL MEDICINE

## 2025-08-18 PROCEDURE — 3078F DIAST BP <80 MM HG: CPT | Performed by: INTERNAL MEDICINE

## 2025-08-18 PROCEDURE — 3074F SYST BP LT 130 MM HG: CPT | Performed by: INTERNAL MEDICINE

## 2025-08-18 PROCEDURE — 99204 OFFICE O/P NEW MOD 45 MIN: CPT | Performed by: INTERNAL MEDICINE

## 2025-08-18 PROCEDURE — G8417 CALC BMI ABV UP PARAM F/U: HCPCS | Performed by: INTERNAL MEDICINE

## 2025-08-18 PROCEDURE — 3017F COLORECTAL CA SCREEN DOC REV: CPT | Performed by: INTERNAL MEDICINE

## 2025-08-18 PROCEDURE — 1160F RVW MEDS BY RX/DR IN RCRD: CPT | Performed by: INTERNAL MEDICINE

## 2025-08-18 PROCEDURE — 1123F ACP DISCUSS/DSCN MKR DOCD: CPT | Performed by: INTERNAL MEDICINE

## 2025-08-18 RX ORDER — HYDROXYZINE HYDROCHLORIDE 25 MG/1
25 TABLET, FILM COATED ORAL 3 TIMES DAILY PRN
COMMUNITY

## 2025-08-26 ENCOUNTER — PROCEDURE VISIT (OUTPATIENT)
Age: 70
End: 2025-08-26

## 2025-08-26 ENCOUNTER — HOSPITAL ENCOUNTER (OUTPATIENT)
Facility: HOSPITAL | Age: 70
Discharge: HOME | End: 2025-08-29
Payer: MEDICARE

## 2025-08-26 DIAGNOSIS — G47.33 OBSTRUCTIVE SLEEP APNEA (ADULT) (PEDIATRIC): Primary | ICD-10-CM

## 2025-08-26 PROCEDURE — 95800 SLP STDY UNATTENDED: CPT | Performed by: INTERNAL MEDICINE

## 2025-08-27 ENCOUNTER — CLINICAL DOCUMENTATION (OUTPATIENT)
Age: 70
End: 2025-08-27

## 2025-08-29 ENCOUNTER — CLINICAL DOCUMENTATION (OUTPATIENT)
Age: 70
End: 2025-08-29

## 2025-08-29 DIAGNOSIS — G47.33 OBSTRUCTIVE SLEEP APNEA (ADULT) (PEDIATRIC): Primary | ICD-10-CM

## 2025-09-05 RX ORDER — HYDROXYZINE HYDROCHLORIDE 25 MG/1
TABLET, FILM COATED ORAL
Qty: 60 TABLET | Refills: 5 | Status: SHIPPED | OUTPATIENT
Start: 2025-09-05